# Patient Record
Sex: MALE | Race: OTHER | NOT HISPANIC OR LATINO | URBAN - METROPOLITAN AREA
[De-identification: names, ages, dates, MRNs, and addresses within clinical notes are randomized per-mention and may not be internally consistent; named-entity substitution may affect disease eponyms.]

---

## 2020-04-24 ENCOUNTER — INPATIENT (INPATIENT)
Facility: HOSPITAL | Age: 70
LOS: 7 days | Discharge: HOME HEALTH | DRG: 871 | End: 2020-05-02
Attending: INTERNAL MEDICINE | Admitting: HOSPITALIST
Payer: MEDICARE

## 2020-04-24 VITALS
TEMPERATURE: 98 F | DIASTOLIC BLOOD PRESSURE: 85 MMHG | SYSTOLIC BLOOD PRESSURE: 148 MMHG | HEART RATE: 98 BPM | OXYGEN SATURATION: 98 %

## 2020-04-24 DIAGNOSIS — Z20.828 CONTACT WITH AND (SUSPECTED) EXPOSURE TO OTHER VIRAL COMMUNICABLE DISEASES: ICD-10-CM

## 2020-04-24 DIAGNOSIS — Z51.5 ENCOUNTER FOR PALLIATIVE CARE: ICD-10-CM

## 2020-04-24 DIAGNOSIS — R09.02 HYPOXEMIA: ICD-10-CM

## 2020-04-24 DIAGNOSIS — R53.81 OTHER MALAISE: ICD-10-CM

## 2020-04-24 DIAGNOSIS — F03.90 UNSPECIFIED DEMENTIA WITHOUT BEHAVIORAL DISTURBANCE: ICD-10-CM

## 2020-04-24 DIAGNOSIS — F05 DELIRIUM DUE TO KNOWN PHYSIOLOGICAL CONDITION: ICD-10-CM

## 2020-04-24 DIAGNOSIS — J96.91 RESPIRATORY FAILURE, UNSPECIFIED WITH HYPOXIA: ICD-10-CM

## 2020-04-24 LAB
ALBUMIN SERPL ELPH-MCNC: 2.7 G/DL — LOW (ref 3.3–5)
ALP SERPL-CCNC: 130 U/L — HIGH (ref 40–120)
ALT FLD-CCNC: 236 U/L — HIGH (ref 10–45)
ANION GAP SERPL CALC-SCNC: 10 MMOL/L — SIGNIFICANT CHANGE UP (ref 5–17)
ANION GAP SERPL CALC-SCNC: 8 MMOL/L — SIGNIFICANT CHANGE UP (ref 5–17)
APPEARANCE UR: CLEAR — SIGNIFICANT CHANGE UP
AST SERPL-CCNC: 284 U/L — HIGH (ref 10–40)
BACTERIA # UR AUTO: NEGATIVE — SIGNIFICANT CHANGE UP
BASE EXCESS BLDV CALC-SCNC: 2.8 MMOL/L — HIGH (ref -2–2)
BASOPHILS # BLD AUTO: 0.04 K/UL — SIGNIFICANT CHANGE UP (ref 0–0.2)
BASOPHILS NFR BLD AUTO: 0.6 % — SIGNIFICANT CHANGE UP (ref 0–2)
BILIRUB SERPL-MCNC: 0.6 MG/DL — SIGNIFICANT CHANGE UP (ref 0.2–1.2)
BILIRUB UR-MCNC: NEGATIVE — SIGNIFICANT CHANGE UP
BUN SERPL-MCNC: 15 MG/DL — SIGNIFICANT CHANGE UP (ref 7–23)
BUN SERPL-MCNC: 18 MG/DL — SIGNIFICANT CHANGE UP (ref 7–23)
CA-I SERPL-SCNC: 1.19 MMOL/L — SIGNIFICANT CHANGE UP (ref 1.12–1.3)
CALCIUM SERPL-MCNC: 8.6 MG/DL — SIGNIFICANT CHANGE UP (ref 8.4–10.5)
CALCIUM SERPL-MCNC: 9 MG/DL — SIGNIFICANT CHANGE UP (ref 8.4–10.5)
CHLORIDE BLDV-SCNC: 115 MMOL/L — HIGH (ref 96–108)
CHLORIDE SERPL-SCNC: 108 MMOL/L — SIGNIFICANT CHANGE UP (ref 96–108)
CHLORIDE SERPL-SCNC: 109 MMOL/L — HIGH (ref 96–108)
CO2 BLDV-SCNC: 29 MMOL/L — SIGNIFICANT CHANGE UP (ref 22–30)
CO2 SERPL-SCNC: 21 MMOL/L — LOW (ref 22–31)
CO2 SERPL-SCNC: 23 MMOL/L — SIGNIFICANT CHANGE UP (ref 22–31)
COLOR SPEC: YELLOW — SIGNIFICANT CHANGE UP
CREAT SERPL-MCNC: 0.51 MG/DL — SIGNIFICANT CHANGE UP (ref 0.5–1.3)
CREAT SERPL-MCNC: 0.56 MG/DL — SIGNIFICANT CHANGE UP (ref 0.5–1.3)
CRP SERPL-MCNC: 27.76 MG/DL — HIGH (ref 0–0.4)
D DIMER BLD IA.RAPID-MCNC: 1747 NG/ML DDU — HIGH
DIFF PNL FLD: NEGATIVE — SIGNIFICANT CHANGE UP
EOSINOPHIL # BLD AUTO: 0.04 K/UL — SIGNIFICANT CHANGE UP (ref 0–0.5)
EOSINOPHIL NFR BLD AUTO: 0.6 % — SIGNIFICANT CHANGE UP (ref 0–6)
EPI CELLS # UR: 2 /HPF — SIGNIFICANT CHANGE UP
FERRITIN SERPL-MCNC: 3623 NG/ML — HIGH (ref 30–400)
GAS PNL BLDV: 143 MMOL/L — SIGNIFICANT CHANGE UP (ref 135–145)
GAS PNL BLDV: SIGNIFICANT CHANGE UP
GAS PNL BLDV: SIGNIFICANT CHANGE UP
GLUCOSE BLDV-MCNC: 110 MG/DL — HIGH (ref 70–99)
GLUCOSE SERPL-MCNC: 127 MG/DL — HIGH (ref 70–99)
GLUCOSE SERPL-MCNC: 144 MG/DL — HIGH (ref 70–99)
GLUCOSE UR QL: NEGATIVE — SIGNIFICANT CHANGE UP
HCO3 BLDV-SCNC: 28 MMOL/L — SIGNIFICANT CHANGE UP (ref 21–29)
HCT VFR BLD CALC: 34.8 % — LOW (ref 39–50)
HCT VFR BLDA CALC: 38 % — LOW (ref 39–50)
HGB BLD CALC-MCNC: 12.3 G/DL — LOW (ref 13–17)
HGB BLD-MCNC: 11.3 G/DL — LOW (ref 13–17)
HYALINE CASTS # UR AUTO: 0 /LPF — SIGNIFICANT CHANGE UP (ref 0–7)
IMM GRANULOCYTES NFR BLD AUTO: 2.1 % — HIGH (ref 0–1.5)
KETONES UR-MCNC: SIGNIFICANT CHANGE UP
LACTATE BLDV-MCNC: 2.7 MMOL/L — HIGH (ref 0.7–2)
LEUKOCYTE ESTERASE UR-ACNC: NEGATIVE — SIGNIFICANT CHANGE UP
LYMPHOCYTES # BLD AUTO: 0.59 K/UL — LOW (ref 1–3.3)
LYMPHOCYTES # BLD AUTO: 8.2 % — LOW (ref 13–44)
MCHC RBC-ENTMCNC: 30.6 PG — SIGNIFICANT CHANGE UP (ref 27–34)
MCHC RBC-ENTMCNC: 32.5 GM/DL — SIGNIFICANT CHANGE UP (ref 32–36)
MCV RBC AUTO: 94.3 FL — SIGNIFICANT CHANGE UP (ref 80–100)
MONOCYTES # BLD AUTO: 0.29 K/UL — SIGNIFICANT CHANGE UP (ref 0–0.9)
MONOCYTES NFR BLD AUTO: 4 % — SIGNIFICANT CHANGE UP (ref 2–14)
NEUTROPHILS # BLD AUTO: 6.06 K/UL — SIGNIFICANT CHANGE UP (ref 1.8–7.4)
NEUTROPHILS NFR BLD AUTO: 84.5 % — HIGH (ref 43–77)
NITRITE UR-MCNC: NEGATIVE — SIGNIFICANT CHANGE UP
NRBC # BLD: 0 /100 WBCS — SIGNIFICANT CHANGE UP (ref 0–0)
OTHER CELLS CSF MANUAL: 3 ML/DL — LOW (ref 18–22)
PCO2 BLDV: 45 MMHG — SIGNIFICANT CHANGE UP (ref 35–50)
PH BLDV: 7.4 — SIGNIFICANT CHANGE UP (ref 7.35–7.45)
PH UR: 7.5 — SIGNIFICANT CHANGE UP (ref 5–8)
PLATELET # BLD AUTO: 353 K/UL — SIGNIFICANT CHANGE UP (ref 150–400)
PO2 BLDV: <20 MMHG — LOW (ref 25–45)
POTASSIUM BLDV-SCNC: 3.7 MMOL/L — SIGNIFICANT CHANGE UP (ref 3.5–5.3)
POTASSIUM SERPL-MCNC: 6.1 MMOL/L — HIGH (ref 3.5–5.3)
POTASSIUM SERPL-MCNC: 7.9 MMOL/L — CRITICAL HIGH (ref 3.5–5.3)
POTASSIUM SERPL-SCNC: 6.1 MMOL/L — HIGH (ref 3.5–5.3)
POTASSIUM SERPL-SCNC: 7.9 MMOL/L — CRITICAL HIGH (ref 3.5–5.3)
PROCALCITONIN SERPL-MCNC: 0.16 NG/ML — HIGH (ref 0.02–0.1)
PROT SERPL-MCNC: 7.6 G/DL — SIGNIFICANT CHANGE UP (ref 6–8.3)
PROT UR-MCNC: ABNORMAL
RBC # BLD: 3.69 M/UL — LOW (ref 4.2–5.8)
RBC # FLD: 14.1 % — SIGNIFICANT CHANGE UP (ref 10.3–14.5)
RBC CASTS # UR COMP ASSIST: 3 /HPF — SIGNIFICANT CHANGE UP (ref 0–4)
SAO2 % BLDV: 19 % — LOW (ref 67–88)
SARS-COV-2 RNA SPEC QL NAA+PROBE: SIGNIFICANT CHANGE UP
SODIUM SERPL-SCNC: 139 MMOL/L — SIGNIFICANT CHANGE UP (ref 135–145)
SODIUM SERPL-SCNC: 140 MMOL/L — SIGNIFICANT CHANGE UP (ref 135–145)
SP GR SPEC: 1.04 — HIGH (ref 1.01–1.02)
UROBILINOGEN FLD QL: ABNORMAL
WBC # BLD: 7.17 K/UL — SIGNIFICANT CHANGE UP (ref 3.8–10.5)
WBC # FLD AUTO: 7.17 K/UL — SIGNIFICANT CHANGE UP (ref 3.8–10.5)
WBC UR QL: 1 /HPF — SIGNIFICANT CHANGE UP (ref 0–5)

## 2020-04-24 PROCEDURE — 99291 CRITICAL CARE FIRST HOUR: CPT | Mod: CS,GC

## 2020-04-24 PROCEDURE — 74177 CT ABD & PELVIS W/CONTRAST: CPT | Mod: 26

## 2020-04-24 PROCEDURE — 99223 1ST HOSP IP/OBS HIGH 75: CPT

## 2020-04-24 PROCEDURE — 71045 X-RAY EXAM CHEST 1 VIEW: CPT | Mod: 26

## 2020-04-24 PROCEDURE — 93010 ELECTROCARDIOGRAM REPORT: CPT

## 2020-04-24 PROCEDURE — 99222 1ST HOSP IP/OBS MODERATE 55: CPT

## 2020-04-24 PROCEDURE — 99221 1ST HOSP IP/OBS SF/LOW 40: CPT | Mod: CS,AI

## 2020-04-24 PROCEDURE — 99221 1ST HOSP IP/OBS SF/LOW 40: CPT

## 2020-04-24 RX ORDER — LANOLIN ALCOHOL/MO/W.PET/CERES
3 CREAM (GRAM) TOPICAL AT BEDTIME
Refills: 0 | Status: DISCONTINUED | OUTPATIENT
Start: 2020-04-24 | End: 2020-05-02

## 2020-04-24 RX ORDER — ENOXAPARIN SODIUM 100 MG/ML
40 INJECTION SUBCUTANEOUS DAILY
Refills: 0 | Status: DISCONTINUED | OUTPATIENT
Start: 2020-04-24 | End: 2020-05-02

## 2020-04-24 RX ORDER — HALOPERIDOL DECANOATE 100 MG/ML
0.5 INJECTION INTRAMUSCULAR EVERY 8 HOURS
Refills: 0 | Status: DISCONTINUED | OUTPATIENT
Start: 2020-04-24 | End: 2020-04-29

## 2020-04-24 RX ORDER — HALOPERIDOL DECANOATE 100 MG/ML
0.5 INJECTION INTRAMUSCULAR EVERY 6 HOURS
Refills: 0 | Status: DISCONTINUED | OUTPATIENT
Start: 2020-04-24 | End: 2020-04-24

## 2020-04-24 RX ORDER — ACETAMINOPHEN 500 MG
1000 TABLET ORAL ONCE
Refills: 0 | Status: COMPLETED | OUTPATIENT
Start: 2020-04-24 | End: 2020-04-24

## 2020-04-24 RX ORDER — HYDROXYZINE HCL 10 MG
25 TABLET ORAL DAILY
Refills: 0 | Status: DISCONTINUED | OUTPATIENT
Start: 2020-04-24 | End: 2020-04-29

## 2020-04-24 RX ORDER — ALBUTEROL 90 UG/1
2 AEROSOL, METERED ORAL EVERY 4 HOURS
Refills: 0 | Status: DISCONTINUED | OUTPATIENT
Start: 2020-04-24 | End: 2020-04-27

## 2020-04-24 RX ORDER — SODIUM CHLORIDE 9 MG/ML
1000 INJECTION, SOLUTION INTRAVENOUS
Refills: 0 | Status: DISCONTINUED | OUTPATIENT
Start: 2020-04-24 | End: 2020-04-25

## 2020-04-24 RX ADMIN — SODIUM CHLORIDE 75 MILLILITER(S): 9 INJECTION, SOLUTION INTRAVENOUS at 15:55

## 2020-04-24 RX ADMIN — HALOPERIDOL DECANOATE 0.5 MILLIGRAM(S): 100 INJECTION INTRAMUSCULAR at 13:18

## 2020-04-24 RX ADMIN — Medication 0.5 MILLIGRAM(S): at 10:41

## 2020-04-24 RX ADMIN — ALBUTEROL 2 PUFF(S): 90 AEROSOL, METERED ORAL at 13:12

## 2020-04-24 RX ADMIN — Medication 1 APPLICATION(S): at 15:55

## 2020-04-24 RX ADMIN — Medication 400 MILLIGRAM(S): at 15:46

## 2020-04-24 RX ADMIN — ENOXAPARIN SODIUM 40 MILLIGRAM(S): 100 INJECTION SUBCUTANEOUS at 13:02

## 2020-04-24 NOTE — BEHAVIORAL HEALTH ASSESSMENT NOTE - RISK ASSESSMENT
Low Acute Suicide Risk Risk factors: acute/chronic medical issues, acute/chronic cognitive impairments, noncompliant with treatment, not receiving treatment, recent irritability and agitation while in the hospital    Protective factors: no h/o SA/SIB, no h/o psych admissions, no access to weapons, no active substance abuse, with adult children/grandchildren, domiciled, intact marriage, social supports    Overall, pt is a low risk of harm to self/others and does not require psychiatric admission for safety and stabilization.

## 2020-04-24 NOTE — CONSULT NOTE ADULT - SUBJECTIVE AND OBJECTIVE BOX
Patient is a 70y old  Male who presents with a chief complaint of sob (24 Apr 2020 14:41)      HPI:  69 yo M hx dementia nonverbal at baseline,   . Per pt's daughter: pt was short of breath, productive coughing for 1 week, lethargy.   Has had "on and off" for few weeks, and has been having fever Tm 104. Has not been tested for coronavirus.    Per EMS, pt has been "sick" for 3 weeks in a house with 2 covid positive family members.   Per EMS pt saturating 88% on RA improved with NRB to 96%.   Per EMS pt is at his baseline now, A&Ox0  Above reviewed:   Pt unable to provide any information due to dementia.       PAST MEDICAL & SURGICAL HISTORY:  Dementia      REVIEW OF SYSTEMS    General:	Denies any chills. Fevers absent    Skin: No rash  	  Ophthalmologic: Denies any visual complaints, discharge, redness.  	  ENMT: No nasal congestion or throat pain.     Respiratory and Thorax: No cough, sputum or chest pain. Denies shortness of breath.  	  Cardiovascular: No chest pain, palpitations.    Gastrointestinal: No nausea, abdominal pain or diarrhea.    Genitourinary: No dysuria, frequency. No flank pain.    Musculoskeletal: No joint swelling or pain.    Neurological: No confusion. No extremity weakness.    Psychiatric: No hallucinations	    Hematology/Lymphatics: No LN swelling.    Endocrine: No recent weight gain or loss. No abnormal heat/cold intolerance    Allergic/Immunologic:	No hives or rash     Social history:    FAMILY HISTORY:      Allergies    No Known Allergies    Intolerances        Antimicrobials:          Vital Signs Last 24 Hrs  T(C): 38.7 (24 Apr 2020 15:35), Max: 38.7 (24 Apr 2020 15:35)  T(F): 101.6 (24 Apr 2020 15:35), Max: 101.6 (24 Apr 2020 15:35)  HR: 69 (24 Apr 2020 15:35) (55 - 98)  BP: 134/74 (24 Apr 2020 15:35) (123/90 - 148/85)  BP(mean): --  RR: 30 (24 Apr 2020 15:35) (28 - 30)  SpO2: 94% (24 Apr 2020 15:35) (93% - 98%)    PHYSICAL EXAM: Patient in no acute distress.    Constitutional: Comfortable. Awake and alert    Eyes: No discharge or conjunctival injection    ENMT: No thrush. No pharyngeal exudate or erythema.    Neck: Supple, No LN.    Respiratory: Good air entry bilaterally.    Cardiovascular: S1 S2 wnl, No murmurs.    Gastrointestinal: Soft BS(+) no tenderness, non distended.    Genitourinary: No CVA tenderness     Extremities: No edema.    Vascular: peripheral pulses felt    Neurological: AAO X 3. No grossly focal deficits.    Skin: No rash     Musculoskeletal: No joint swelling.    Psychiatric: Affect normal.                              11.3   7.17  )-----------( 353      ( 24 Apr 2020 08:16 )             34.8       04-24    139  |  108  |  15  ----------------------------<  127<H>  6.1<H>   |  21<L>  |  0.51    Ca    8.6      24 Apr 2020 10:29    TPro  7.6  /  Alb  2.7<L>  /  TBili  0.6  /  DBili  x   /  AST  284<H>  /  ALT  236<H>  /  AlkPhos  130<H>  04-24            Radiology: Imaging reviewed and visualized personally [ x] Patient is a 70y old  Male who presents with a chief complaint of sob (24 Apr 2020 14:41)      HPI:  69 yo M hx dementia nonverbal at baseline,   . Per pt's daughter: pt was short of breath, productive coughing for 1 week, lethargy.   Has had "on and off" for few weeks, and has been having fever Tm 104. Has not been tested for coronavirus.    Per EMS, pt has been "sick" for 3 weeks in a house with 2 covid positive family members.   Per EMS pt saturating 88% on RA improved with NRB to 96%.   Per EMS pt is at his baseline now, A&Ox0  Rash noted by EMS.   Above reviewed:   Pt unable to provide any information due to dementia.       PAST MEDICAL & SURGICAL HISTORY:  Dementia      REVIEW OF SYSTEMS  Unable to obtain due to pt's underlying mental status.       Social history:  Lives with family.       FAMILY HISTORY:  Unable to obtain due to pt's underlying mental status.       Allergies  No Known Allergies      Antimicrobials:        Vital Signs Last 24 Hrs  T(C): 38.7 (24 Apr 2020 15:35), Max: 38.7 (24 Apr 2020 15:35)  T(F): 101.6 (24 Apr 2020 15:35), Max: 101.6 (24 Apr 2020 15:35)  HR: 69 (24 Apr 2020 15:35) (55 - 98)  BP: 134/74 (24 Apr 2020 15:35) (123/90 - 148/85)  BP(mean): --  RR: 30 (24 Apr 2020 15:35) (28 - 30)  SpO2: 94% (24 Apr 2020 15:35) (93% - 98%)        PHYSICAL EXAM:     Constitutional: Agitated, appears tachypneic. On O2.     Eyes: No conjunctival injection    ENT: No thrush.    Neck: Supple,     Respiratory: + air entry bilaterally, rales scattered.     Cardiovascular: S1 S2 wnl,    Gastrointestinal: Soft BS(+) non distended.    Genitourinary: condom catheter.      Extremities: No edema.    Vascular: peripheral pulses felt    Neurological: Moving all extremities.     Skin: + rash papular, mostly chest and abdomen, abdomen lt sided, chest b/l, mostly excoriated rash, one of two ? vesicles.     Musculoskeletal: No joint swelling.    Psychiatric: agitated.                              11.3   7.17  )-----------( 353      ( 24 Apr 2020 08:16 )             34.8       04-24    139  |  108  |  15  ----------------------------<  127<H>  6.1<H>   |  21<L>  |  0.51    Ca    8.6      24 Apr 2020 10:29    TPro  7.6  /  Alb  2.7<L>  /  TBili  0.6  /  DBili  x   /  AST  284<H>  /  ALT  236<H>  /  AlkPhos  130<H>  04-24      Urinalysis + Microscopic Examination (04.24.20 @ 17:55)    Glucose Qualitative, Urine: Negative    Blood, Urine: Negative    Urine Appearance: Clear    Bilirubin: Negative    Color: Yellow    Urobilinogen: 4 mg/dL    Specific Gravity: 1.044    Protein, Urine: 30 mg/dL    pH Urine: 7.5    Leukocyte Esterase Concentration: Negative    Nitrite: Negative    Ketone - Urine: Trace    Red Blood Cell - Urine: 3 /hpf    White Blood Cell - Urine: 1 /HPF    Epithelial Cells: 2 /hpf    Hyaline Casts: 0 /LPF    Bacteria: Negative      COVID-19 PCR . (04.24.20 @ 09:22)    COVID-19 PCR: NotDetec        Radiology: Imaging reviewed and visualized personally [ x]    < from: Xray Chest 1 View- PORTABLE-Urgent (04.24.20 @ 07:48) >  IMPRESSION: Diffuse bilateral patchy opacities likely representing atypical/viral pneumonia. COVID 19 is considered.      < from: CT Abdomen and Pelvis w/ IV Cont (04.24.20 @ 10:00) >  IMPRESSION:   Right inguinal hernia that involves a portion of the bladder fundus. See above.  No evidence of bowel related inflammation nor obstruction.  Moderate bibasilar parenchymal infiltrates compatible with atypical pneumonia including Covid 19.

## 2020-04-24 NOTE — BEHAVIORAL HEALTH ASSESSMENT NOTE - OTHER
unable to assess- defer to H&P patient nonverbal patient nonverbal, moaning unable to assess- patient nonverbal see above grandson

## 2020-04-24 NOTE — CONSULT NOTE ADULT - SUBJECTIVE AND OBJECTIVE BOX
HPI    69 yo M hx dementia nonverbal at baseline, family denies other PMHx. Per pt's daughter: pt was short of breath, productive coughing for 1 week, lethargy. Has had "on and off" for few weeks, and has been having fever Tm 104. Has not been tested for coronavirus.  Per EMS, pt has been "sick" for 3 weeks in a house with 2 covid positive family members. Per EMS pt saturating 88% on RA improved with NRB to 96%. Also per EMS rash to trunk noted. Per EMS pt is at his baseline now, A&Ox0.    PAST MEDICAL & SURGICAL HISTORY:  Dementia    MEDICATIONS  (STANDING):  betamethasone valerate 0.1% Cream 1 Application(s) Topical two times a day  enoxaparin Injectable 40 milliGRAM(s) SubCutaneous daily    MEDICATIONS  (PRN):  ALBUTerol    90 MICROgram(s) HFA Inhaler 2 Puff(s) Inhalation every 4 hours PRN Wheezing  haloperidol    Injectable 0.5 milliGRAM(s) IntraMuscular every 8 hours PRN Agitation  hydrOXYzine hydrochloride 25 milliGRAM(s) Oral daily PRN Itching    FAMILY HISTORY: N/A    Allergis    No Known Allergies    Intolerances    SOCIAL HISTORY: N/A    REVIEW OF SYSTEMS: Otherwise negative as stated in HPI    Physical Exam  Vital signs  T(C): 37.2 (04-24-20 @ 12:10), Max: 37.6 (04-24-20 @ 07:47)  HR: 55 (04-24-20 @ 12:10)  BP: 127/78 (04-24-20 @ 12:10)  SpO2: 93% (04-24-20 @ 12:10)  Wt(kg): --    Output    Gen:  Agitated    Pulm:  CTAB, no rales, no rhonchi  	  CV:  RRR    GI:  S/ND/NT    :  Right inguinal hernia  No suprapubic fullness or tenderness    LABS:  04-24 @ 10:29  WBC --    / Hct --    / SCr 0.51     04-24 @ 08:16  WBC 7.17  / Hct 34.8  / SCr 0.56     04-24    139  |  108  |  15  ----------------------------<  127<H>  6.1<H>   |  21<L>  |  0.51    Ca    8.6      24 Apr 2020 10:29    TPro  7.6  /  Alb  2.7<L>  /  TBili  0.6  /  DBili  x   /  AST  284<H>  /  ALT  236<H>  /  AlkPhos  130<H>  04-24    Urine Cx: p  Blood Cx: p    RADIOLOGY:  < from: CT Abdomen and Pelvis w/ IV Cont (04.24.20 @ 10:00) >  IMPRESSION:   Right inguinal hernia that involves a portion of the bladder fundus. See above.  No evidence of bowel related inflammation nor obstruction.  Moderate bibasilar parenchymal infiltrates compatible with atypical pneumonia including Covid 19.  The pertinent findings were discussed with Dr. Díaz at the conclusion of today's evaluation.    < end of copied text >

## 2020-04-24 NOTE — CONSULT NOTE ADULT - SUBJECTIVE AND OBJECTIVE BOX
COVID 19 Status:  [    x ]  Person of interest/contact with known COVID patient/Rule out COVID  [     ]  Confirmed COVID/COVID Positive    Current Location  [facility] 9MON 913 W1      Reason for consult  [      ] Intractable symptoms/symptoms not controlled despite treatment intiation and escalation  [  x    ] Assistance with complicated medical decision making after initial goals of care discussion  conducted and documented  [      ] Both        Ventilator Status  [      ] Yes and Intubated  [      ] Yes and trach  [  x    ] No        A review of the paper chart has been conducted  HCP form present:               Yes and valid []               Yes but invalid []                No []   MOLST present:                   Yes and valid []               Yes but invalid []                No []  Incapacity form present:   Yes and valid []                Yes but invalid []                No []                 N/A []  Living Will:                            Yes and valid []                Yes but invalid []                No []      Family Heatlh Care Decision Act Surrogate Decision Maker Hierarchy  [facility] Article 81 Guardian-->Spouse or domestic partner--> Adult child-->Parent-->Sibling--> Close friend      Contacts listed in the paper or electronic chart  Name  Relationship  Number(s)        In the event of newly developing, evolving, or worsening symptoms, please contact the Palliative Medicine team via pager (if the patient is at [facility] #[phone] or if the patient is at [facility] #[phone]) The Geriatric and Palliative Medicine service has coverage 24 hours a day/ 7 days a week to provide medical recommendations regarding symptom management needs via telephone          HPI:  71 yo M hx dementia nonverbal at baseline,   . Per pt's daughter: pt was short of breath, productive coughing for 1 week, lethargy.   Has had "on and off" for few weeks, and has been having fever Tm 104. Has not been tested for coronavirus.    Per EMS, pt has been "sick" for 3 weeks in a house with 2 covid positive family members.   Per EMS pt saturating 88% on RA improved with NRB to 96%.   Also per EMS rash to trunk noted. Per EMS pt is at his baseline now, A&Ox0  . Per grandson, pt's daughter Sade Roca is HCP. (24 Apr 2020 11:24)    PERTINENT PM/SXH:   Dementia      FAMILY HISTORY:    ITEMS NOT CHECKED ARE NOT PRESENT    SOCIAL HISTORY:   Significant other/partner[ ]  Children[ ]  Shinto/Spirituality:  Substance hx:  [ ]   Tobacco hx:  [ ]   Alcohol hx: [ ]   Home Opioid hx:  [ ] I-Stop Reference No:  Living Situation: [ ]Home  [ ]Long term care  [ ]Rehab [ ]Other    ADVANCE DIRECTIVES:    DNR  MOLST  [ ]  Living Will  [ ]   DECISION MAKER(s):  [ ] Health Care Proxy(s)  [ ] Surrogate(s)  [ ] Guardian           Name(s): Phone Number(s):    BASELINE (I)ADL(s) (prior to admission):  [place]: [ ]Total  [ ] Moderate [ ]Dependent    Allergies    No Known Allergies    Intolerances    MEDICATIONS  (STANDING):  enoxaparin Injectable 40 milliGRAM(s) SubCutaneous daily    MEDICATIONS  (PRN):    PRESENT SYMPTOMS: [ ]Unable to obtain due to poor mentation   Source if other than patient:  [ ]Family   [ ]Team     Pain: [ ]yes [ ]no  QOL impact -   Location -                    Aggravating factors -  Quality -  Radiation -  Timing-  Severity (0-10 scale):  Minimal acceptable level (0-10 scale):     PAIN AD Score:      http://[email or web address]/painad.pdf (press ctrl +  left click to view)    Dyspnea:                           [ ]Mild [ ]Moderate [ ]Severe  Anxiety:                             [ ]Mild [ ]Moderate [ ]Severe  Fatigue:                             [ ]Mild [ ]Moderate [ ]Severe  Nausea:                             [ ]Mild [ ]Moderate [ ]Severe  Loss of appetite:              [ ]Mild [ ]Moderate [ ]Severe  Constipation:                    [ ]Mild [ ]Moderate [ ]Severe    Other Symptoms:  [ ]All other review of systems negative     Palliative Performance Status Version 2:         %    http://npcrc.org/files/news/palliative_performance_scale_ppsv2.pdf  PHYSICAL EXAM:  Vital Signs Last 24 Hrs  T(C): 37.2 (24 Apr 2020 12:10), Max: 37.6 (24 Apr 2020 07:47)  T(F): 98.9 (24 Apr 2020 12:10), Max: 99.7 (24 Apr 2020 07:47)  HR: 55 (24 Apr 2020 12:10) (55 - 98)  BP: 127/78 (24 Apr 2020 12:10) (123/90 - 148/85)  BP(mean): --  RR: 4 (24 Apr 2020 12:10) (4 - 4)  SpO2: 93% (24 Apr 2020 12:10) (93% - 98%) I&O's Summary          GENERAL: [ x] To prevent/minimize further potential COVID 19 exposure  to patients and health care staff, the physical examination will be deferred  [ ]Alert  [ ]Oriented x   [ ]Lethargic  [ ]Cachexia  [ ]Unarousable  [ ]Verbal  [ ]Non-Verbal    Behavioral:   [ x] To prevent/minimize further potential COVID 19 exposure  to patients and health care staff, the physical examination will be deferred  [ ] Anxiety  [ ] Delirium [ ] Agitation [ ] Other  HEENT  [x ] To prevent/minimize further potential COVID 19 exposure  to patients and health care staff, the physical examination will be deferred  [ ]Normal   [ ]Dry mouth   [ ]ET Tube/Trach  [ ]Oral lesions  PULMONARY:   [ x] To prevent/minimize further potential COVID 19 exposure  to patients and health care staff, the physical examination will be deferred  [ ]Clear [ ]Tachypnea  [ ]Audible excessive secretions   [ ]Rhonchi        [ ]Right [ ]Left [ ]Bilateral  [ ]Crackles        [ ]Right [ ]Left [ ]Bilateral  [ ]Wheezing     [ ]Right [ ]Left [ ]Bilatera  [ ]Diminished breath sounds [ ]right [ ]left [ ]bilateral  CARDIOVASCULAR:    [x ] To prevent/minimize further potential COVID 19 exposure  to patients and health care staff, the physical examination will be deferred  [ ]Regular [ ]Irregular [ ]Tachy  [ ]Juan [ ]Murmur [ ]Other  GASTROINTESTINAL:  [x ] To prevent/minimize further potential COVID 19 exposure  to patients and health care staff, the physical examination will be deferred  [ ]Soft  [ ]Distended   [ ]+BS  [ ]Non tender [ ]Tender  [ ]PEG [ ]OGT/ NGT  Last BM:   GENITOURINARY:  [ x] To prevent/minimize further potential COVID 19 exposure  to patients and health care staff, the physical examination will be deferred  [ ]Normal [ ] Incontinent   [ ]Oliguria/Anuria   [ ]Olea  MUSCULOSKELETAL:   [ x] To prevent/minimize further potential COVID 19 exposure  to patients and health care staff, the physical examination will be deferred  [ ]Normal   [ ]Weakness  [ ]Bed/Wheelchair bound [ ]Edema  NEUROLOGIC:   [ x] To prevent/minimize further potential COVID 19 exposure  to patients and health care staff, the physical examination will be deferred  [ ]No focal deficits  [ ]Cognitive impairment  [ ]Dysphagia [ ]Dysarthria [ ]Paresis [ ]Other   SKIN:   [ x] To prevent/minimize further potential COVID 19 exposure  to patients and health care staff, the physical examination will be deferred  [ ]Normal    [ ]Rash  [ ]Pressure ulcer(s)       Present on admission [ ]y [ ]n    CRITICAL CARE:  [ ] Shock Present  [ ]Septic [ ]Cardiogenic [ ]Neurologic [ ]Hypovolemic  [ ]  Vasopressors [ ]  Inotropes   [ ]Respiratory failure present [ ]Mechanical ventilation [ ]Non-invasive ventilatory support [ ]High flow  [ ]Acute  [ ]Chronic [ ]Hypoxic  [ ]Hypercarbic [ ]Other  [ ]Other organ failure     LABS:                        11.3   7.17  )-----------( 353      ( 24 Apr 2020 08:16 )             34.8   04-24    139  |  108  |  15  ----------------------------<  127<H>  6.1<H>   |  21<L>  |  0.51    Ca    8.6      24 Apr 2020 10:29    TPro  7.6  /  Alb  2.7<L>  /  TBili  0.6  /  DBili  x   /  AST  284<H>  /  ALT  236<H>  /  AlkPhos  130<H>  04-24        RADIOLOGY & ADDITIONAL STUDIES:    PROTEIN CALORIE MALNUTRITION PRESENT: [ ]mild [ ]moderate [ ]severe [ ]underweight [ ]morbid obesity  https://www.andeal.org/vault/2440/web/files/ONC/Table_Clinical%20Characteristics%20to%20Document%20Malnutrition-White%20JV%20et%20al%048265.pdf    Height (cm): 182.9 (04-24-20 @ 12:10)  Weight (kg): 77.5 (04-24-20 @ 12:10)  BMI (kg/m2): 23.2 (04-24-20 @ 12:10)    [ ]PPSV2 < or = to 30% [ ]significant weight loss  [ ]poor nutritional intake  [ ]anasarca     Albumin, Serum: 2.7 g/dL (04-24-20 @ 08:16)   [ ]Artificial Nutrition      REFERRALS:   [ ]Chaplaincy  [ ]Hospice  [ ]Child Life  [ ]Social Work  [ ]Case management [ ]Holistic Therapy     Goals of Care Document: COVID 19 Status:  [    x ]  Person of interest/contact with known COVID patient/Rule out COVID  [     ]  Confirmed COVID/COVID Positive    Current Location  [facility] 9MON 913 W1      Reason for consult  [      ] Intractable symptoms/symptoms not controlled despite treatment intiation and escalation  [  x    ] Assistance with complicated medical decision making after initial goals of care discussion  conducted and documented  [      ] Both        Ventilator Status  [      ] Yes and Intubated  [      ] Yes and trach  [  x    ] No        A review of the paper chart/EMR  has been conducted  HCP form present:               Yes and valid []               Yes but invalid []                No [x]   MOLST present:                   Yes and valid []               Yes but invalid []                No [x]  Incapacity form present:   Yes and valid []                Yes but invalid []                No [x]                 N/A []  Living Will:                            Yes and valid []                Yes but invalid []                No [x]      Family Heatlh Care Decision Act Surrogate Decision Maker Hierarchy  [facility] Article 81 Guardian-->Spouse or domestic partner--> Adult child-->Parent-->Sibling--> Close friend      Contacts listed in the paper or electronic chart  Name  Relationship  Number(s)        In the event of newly developing, evolving, or worsening symptoms, please contact the Palliative Medicine team via pager (if the patient is at [facility] #[phone] or if the patient is at [facility] #[phone]) The Geriatric and Palliative Medicine service has coverage 24 hours a day/ 7 days a week to provide medical recommendations regarding symptom management needs via telephone          HPI:  69 yo M hx dementia nonverbal at baseline,   . Per pt's daughter: pt was short of breath, productive coughing for 1 week, lethargy.   Has had "on and off" for few weeks, and has been having fever Tm 104. Has not been tested for coronavirus.    Per EMS, pt has been "sick" for 3 weeks in a house with 2 covid positive family members.   Per EMS pt saturating 88% on RA improved with NRB to 96%.   Also per EMS rash to trunk noted. Per EMS pt is at his baseline now, A&Ox0  . Per grandson, pt's daughter Sade Roca is HCP. (24 Apr 2020 11:24)    PERTINENT PM/SXH:   Dementia      FAMILY HISTORY:    ITEMS NOT CHECKED ARE NOT PRESENT    SOCIAL HISTORY:   Significant other/partner[ ]  Children[ ]  Restoration/Spirituality:  Substance hx:  [ ]   Tobacco hx:  [ ]   Alcohol hx: [ ]   Home Opioid hx:  [ ] I-Stop Reference No:  Living Situation: [ ]Home  [ ]Long term care  [ ]Rehab [ ]Other    ADVANCE DIRECTIVES:    DNR  MOLST  [ ]  Living Will  [ ]   DECISION MAKER(s):  [ ] Health Care Proxy(s)  [ ] Surrogate(s)  [ ] Guardian           Name(s): Phone Number(s):    BASELINE (I)ADL(s) (prior to admission):  [place]: [ ]Total  [ ] Moderate [ ]Dependent    Allergies    No Known Allergies    Intolerances    MEDICATIONS  (STANDING):  enoxaparin Injectable 40 milliGRAM(s) SubCutaneous daily    MEDICATIONS  (PRN):    PRESENT SYMPTOMS: [ ]Unable to obtain due to poor mentation   Source if other than patient:  [ ]Family   [ ]Team     Pain: [ ]yes [ ]no  QOL impact -   Location -                    Aggravating factors -  Quality -  Radiation -  Timing-  Severity (0-10 scale):  Minimal acceptable level (0-10 scale):     PAIN AD Score:  0    http://geriatrictoolkit.[email or web address]/painad.pdf (press ctrl +  left click to view)    Dyspnea:                           [ ]Mild [ ]Moderate [ ]Severe  Anxiety:                             [ ]Mild [ ]Moderate [ ]Severe  Fatigue:                             [ ]Mild [ ]Moderate [ ]Severe  Nausea:                             [ ]Mild [ ]Moderate [ ]Severe  Loss of appetite:              [ ]Mild [ ]Moderate [ ]Severe  Constipation:                    [ ]Mild [ ]Moderate [ ]Severe    Other Symptoms:  [ x]All other review of systems negative     Palliative Performance Status Version 2:    0     %    http://npcrc.org/files/news/palliative_performance_scale_ppsv2.pdf  PHYSICAL EXAM:  Vital Signs Last 24 Hrs  T(C): 37.2 (24 Apr 2020 12:10), Max: 37.6 (24 Apr 2020 07:47)  T(F): 98.9 (24 Apr 2020 12:10), Max: 99.7 (24 Apr 2020 07:47)  HR: 55 (24 Apr 2020 12:10) (55 - 98)  BP: 127/78 (24 Apr 2020 12:10) (123/90 - 148/85)  BP(mean): --  RR: 4 (24 Apr 2020 12:10) (4 - 4)  SpO2: 93% (24 Apr 2020 12:10) (93% - 98%) I&O's Summary          GENERAL: [ x] To prevent/minimize further potential COVID 19 exposure  to patients and health care staff, the physical examination will be deferred  [ ]Alert  [ ]Oriented x   [ ]Lethargic  [ ]Cachexia  [ ]Unarousable  [ ]Verbal  [ ]Non-Verbal    Behavioral:   [ x] To prevent/minimize further potential COVID 19 exposure  to patients and health care staff, the physical examination will be deferred  [ ] Anxiety  [ ] Delirium [ ] Agitation [ ] Other  HEENT  [x ] To prevent/minimize further potential COVID 19 exposure  to patients and health care staff, the physical examination will be deferred  [ ]Normal   [ ]Dry mouth   [ ]ET Tube/Trach  [ ]Oral lesions  PULMONARY:   [ x] To prevent/minimize further potential COVID 19 exposure  to patients and health care staff, the physical examination will be deferred  [ ]Clear [ ]Tachypnea  [ ]Audible excessive secretions   [ ]Rhonchi        [ ]Right [ ]Left [ ]Bilateral  [ ]Crackles        [ ]Right [ ]Left [ ]Bilateral  [ ]Wheezing     [ ]Right [ ]Left [ ]Bilatera  [ ]Diminished breath sounds [ ]right [ ]left [ ]bilateral  CARDIOVASCULAR:    [x ] To prevent/minimize further potential COVID 19 exposure  to patients and health care staff, the physical examination will be deferred  [ ]Regular [ ]Irregular [ ]Tachy  [ ]Juan [ ]Murmur [ ]Other  GASTROINTESTINAL:  [x ] To prevent/minimize further potential COVID 19 exposure  to patients and health care staff, the physical examination will be deferred  [ ]Soft  [ ]Distended   [ ]+BS  [ ]Non tender [ ]Tender  [ ]PEG [ ]OGT/ NGT  Last BM:   GENITOURINARY:  [ x] To prevent/minimize further potential COVID 19 exposure  to patients and health care staff, the physical examination will be deferred  [ ]Normal [ ] Incontinent   [ ]Oliguria/Anuria   [ ]Olea  MUSCULOSKELETAL:   [ x] To prevent/minimize further potential COVID 19 exposure  to patients and health care staff, the physical examination will be deferred  [ ]Normal   [ ]Weakness  [ ]Bed/Wheelchair bound [ ]Edema  NEUROLOGIC:   [ x] To prevent/minimize further potential COVID 19 exposure  to patients and health care staff, the physical examination will be deferred  [ ]No focal deficits  [ ]Cognitive impairment  [ ]Dysphagia [ ]Dysarthria [ ]Paresis [ ]Other   SKIN:   [ x] To prevent/minimize further potential COVID 19 exposure  to patients and health care staff, the physical examination will be deferred  [ ]Normal    [ ]Rash  [ ]Pressure ulcer(s)       Present on admission [ ]y [ ]n    CRITICAL CARE:  [ ] Shock Present  [ ]Septic [ ]Cardiogenic [ ]Neurologic [ ]Hypovolemic  [ ]  Vasopressors [ ]  Inotropes   [ ]Respiratory failure present [ ]Mechanical ventilation [ ]Non-invasive ventilatory support [ ]High flow  [ ]Acute  [ ]Chronic [ ]Hypoxic  [ ]Hypercarbic [ ]Other  [ ]Other organ failure     LABS:                        11.3   7.17  )-----------( 353      ( 24 Apr 2020 08:16 )             34.8   04-24    139  |  108  |  15  ----------------------------<  127<H>  6.1<H>   |  21<L>  |  0.51    Ca    8.6      24 Apr 2020 10:29    TPro  7.6  /  Alb  2.7<L>  /  TBili  0.6  /  DBili  x   /  AST  284<H>  /  ALT  236<H>  /  AlkPhos  130<H>  04-24        RADIOLOGY & ADDITIONAL STUDIES:    PROTEIN CALORIE MALNUTRITION PRESENT: [ ]mild [ ]moderate [ ]severe [ ]underweight [ ]morbid obesity  https://www.andeal.org/vault/2440/web/files/ONC/Table_Clinical%20Characteristics%20to%20Document%20Malnutrition-White%20JV%20et%20al%654730.pdf    Height (cm): 182.9 (04-24-20 @ 12:10)  Weight (kg): 77.5 (04-24-20 @ 12:10)  BMI (kg/m2): 23.2 (04-24-20 @ 12:10)    [ ]PPSV2 < or = to 30% [ ]significant weight loss  [ ]poor nutritional intake  [ ]anasarca     Albumin, Serum: 2.7 g/dL (04-24-20 @ 08:16)   [ ]Artificial Nutrition      REFERRALS:   [ ]Chaplaincy  [ ]Hospice  [ ]Child Life  [ ]Social Work  [ ]Case management [ ]Holistic Therapy     Goals of Care Document:

## 2020-04-24 NOTE — ED ADULT NURSE REASSESSMENT NOTE - NS ED NURSE REASSESS COMMENT FT1
patient at CT scan. will continue to monitor upon patient arrival back to unit. patient comfort and safety provided.

## 2020-04-24 NOTE — ED ADULT NURSE REASSESSMENT NOTE - NS ED NURSE REASSESS COMMENT FT1
patient turned changed and repositioned. patient comfort and safety provided. will continue to monitor.

## 2020-04-24 NOTE — H&P ADULT - HISTORY OF PRESENT ILLNESS
69 yo M hx dementia nonverbal at baseline,   . Per pt's daughter: pt was short of breath, productive coughing for 1 week, lethargy.   Has had "on and off" for few weeks, and has been having fever Tm 104. Has not been tested for coronavirus.    Per EMS, pt has been "sick" for 3 weeks in a house with 2 covid positive family members.   Per EMS pt saturating 88% on RA improved with NRB to 96%.   Also per EMS rash to trunk noted. Per EMS pt is at his baseline now, A&Ox0  . Per grandson, pt's daughter Sade Roca is HCP.

## 2020-04-24 NOTE — CONSULT NOTE ADULT - PROBLEM SELECTOR RECOMMENDATION 9
Predominant symptom:  Likely due to  Degree of control:  Relative to previous day:  Current treatment regimen:  Recommendations:  Risk mitigation:  Adverse events noted: Predominant symptom: dyspnea and associated cough  Likely due to vrial PNA, COVID PCR negative  Degree of control: well controlled   Relative to previous day:  Current treatment regimen: 2 L NC  Recommendations: maintain supportive care  Risk mitigation: n/a  Adverse events noted: none after d/w primary team

## 2020-04-24 NOTE — CONSULT NOTE ADULT - ASSESSMENT
70 year old male AAOx0, dementia presents with fevers at home and SOB and cough for 1 week.  Incidentally found to have a partial bladder involvement in the right inguinal hernia.     No urologic intervention is needed or necessary for this.  Surgical correction would only involve a right inguinal hernia repair.      -- No urologic intervention necessary  -- Recommend GOC discussion with family  -- If desired by family or found to be necessary, recommend a general surgery consult for the inguinal hernia  -- Will sign off, please re-consult as necessary  -- d/w Dr. Navarro 70 year old male AAOx0, dementia presents with fevers at home and SOB and cough for 1 week.  Incidentally found to have a partial bladder involvement in the right inguinal hernia.     No urologic intervention is needed or necessary for this.  Surgical correction would only involve a right inguinal hernia repair.      -- No urologic intervention necessary  -- Recommend GOC discussion with family  -- If desired by family or found to be necessary, recommend a general surgery consult for the inguinal hernia  -- Will sign off, please re-consult as necessary  -- No need for serial bladder scans  -- d/w Dr. Navarro

## 2020-04-24 NOTE — BEHAVIORAL HEALTH ASSESSMENT NOTE - NSBHCONSULTRECOMMENDOTHER_PSY_A_CORE FT
1. Check: EKG, TSH, B12, folate, RPR, UA; consider CT head    2. Minimize use of benzos, opioids, anticholinergics, or other deliriogenic agents when possible.  Maintain sleep wake cycle.  Provide frequent reorientation and redirection.  Family member at bedside if possible. Assess for need for glasses and hearing aid (if applicable).    3. Pt does not meet criteria for psychiatric hospitalization.  Recommend outpt psych f/u at [facility] after d/c- [phone].   [facility] Psych will follow.

## 2020-04-24 NOTE — ED ADULT TRIAGE NOTE - CHIEF COMPLAINT QUOTE
sob ra 90 %  h/o dementia   exposed to covid sob ra 90 %   Xi Roca [phone]  h/o dementia   exposed to covid

## 2020-04-24 NOTE — CONSULT NOTE ADULT - ASSESSMENT
71 yo M hx dementia nonverbal at baseline, brought in for short of breath, productive coughing for 1 week, lethargy.  New rash    Sepsis, fever, tachypnea, hypoxia likely due to COVID-19 infection leading to pneumonia.  Lactic acidosis,   rash.   Though COVID PCR negative, given CT chest classic for COVID, will treat as COVID.       Plan:  Perform blood cx  U/A with no pyuria, urine cx in lab.   trend markers of inflammation  supportive care.   If oxygen requirement increases and pt ends up requiring NRB can consider dose of Tocilizumab.   rash, not classic for shingles, but ?, performed VZV PCR.

## 2020-04-24 NOTE — H&P ADULT - NSHPPHYSICALEXAM_GEN_ALL_CORE
PHYSICAL EXAMINATION:  Vital Signs Last 24 Hrs  T(C): 37.6 (24 Apr 2020 07:47), Max: 37.6 (24 Apr 2020 07:47)  T(F): 99.7 (24 Apr 2020 07:47), Max: 99.7 (24 Apr 2020 07:47)  HR: 96 (24 Apr 2020 10:38) (82 - 98)  BP: 123/90 (24 Apr 2020 10:38) (123/90 - 148/85)  BP(mean): --  RR: --  SpO2: 95% (24 Apr 2020 10:38) (94% - 98%)  CAPILLARY BLOOD GLUCOSE            GENERAL: NAD, well-groomed,  HEAD:  atraumatic, normocephalic  EYES: sclera anicteric  ENMT: mucous membranes moist  NECK: supple, No JVD  CHEST/LUNG: clear to auscultation bilaterally;    no      rales   ,   no rhonchi,   HEART: normal S1, S2  ABDOMEN: BS+, soft, ND, NT   EXTREMITIES:    no    edema    b/l LEs  NEURO: awake, ,   SKIN: no     rash PHYSICAL EXAMINATION:  Vital Signs Last 24 Hrs  T(C): 37.6 (24 Apr 2020 07:47), Max: 37.6 (24 Apr 2020 07:47)  T(F): 99.7 (24 Apr 2020 07:47), Max: 99.7 (24 Apr 2020 07:47)  HR: 96 (24 Apr 2020 10:38) (82 - 98)  BP: 123/90 (24 Apr 2020 10:38) (123/90 - 148/85)  BP(mean): --  RR: --  SpO2: 95% (24 Apr 2020 10:38) (94% - 98%)  CAPILLARY BLOOD GLUCOSE            GENERAL: NAD, well-groomed,  HEAD:  atraumatic, normocephalic  EYES: sclera anicteric  ENMT: mucous membranes moist  NECK: supple, No JVD  CHEST/LUNG: clear to auscultation bilaterally;    no      rales   ,   no rhonchi,   HEART: normal S1, S2  ABDOMEN: BS+, soft, ND, NT   EXTREMITIES:    no    edema    b/l LEs  NEURO: awake, ,   agitated/  advanced   dementia  SKIN:   maculo papular    rash. mostly  on left  abdominal  wall, with a few  lesions, also on right side

## 2020-04-24 NOTE — ED PROVIDER NOTE - PROGRESS NOTE DETAILS
D/w pt's daughter Sade and grandson Santos, they are deciding about code status for pt. I have informed them of pt's likely poor prognosis based on requirements of supplemental O2, age, and signfiicant dementia. no EKG changes given hyperK, and normal Cr. rpt bmp sent. Family requested another 1-2 hours and are still discussing code status at home.

## 2020-04-24 NOTE — CONSULT NOTE ADULT - PROBLEM SELECTOR RECOMMENDATION 2
Condition:  Degree:  Active treatment:  Clinical impact on complexity: Condition: dementia, etiology unknown  Degree: FAST score unknown, need more collateral information  Active treatment: n/a  Clinical impact on complexity: significant

## 2020-04-24 NOTE — BEHAVIORAL HEALTH ASSESSMENT NOTE - OTHER PAST PSYCHIATRIC HISTORY (INCLUDE DETAILS REGARDING ONSET, COURSE OF ILLNESS, INPATIENT/OUTPATIENT TREATMENT)
h/o dementia diagnosed 6 years ago, had taken quetiapine PRN for agitation and anxiety in the past and has not needed it in the last 2-3 years.  no h/o SA, hallucinations or substance abuse, no inpt psychiatric hospitalizations

## 2020-04-24 NOTE — CONSULT NOTE ADULT - CONSULT REASON
consulted for assistance with medical decision making in the context of a patient with suspected COVID

## 2020-04-24 NOTE — BEHAVIORAL HEALTH ASSESSMENT NOTE - NSBHCHARTREVIEWLAB_PSY_A_CORE FT
11.3   7.17  )-----------( 353      ( 24 Apr 2020 08:16 )             34.8     04-24    139  |  108  |  15  ----------------------------<  127<H>  6.1<H>   |  21<L>  |  0.51    Ca    8.6      24 Apr 2020 10:29    TPro  7.6  /  Alb  2.7<L>  /  TBili  0.6  /  DBili  x   /  AST  284<H>  /  ALT  236<H>  /  AlkPhos  130<H>  04-24

## 2020-04-24 NOTE — ED PROVIDER NOTE - OBJECTIVE STATEMENT
69 yo M hx dementia, unclear add'l PMHx. Per EMS, pt has been "sick" for 3 weeks in a house with 2 covid positive family members. Per EMS pt saturating 88% on RA improved with NRB to 96%. Also per EMS rash to trunk noted. Attempted to call ph # in EMR, # is out of service 71 yo M hx dementia, unclear add'l PMHx. Per EMS, pt has been "sick" for 3 weeks in a house with 2 covid positive family members. Per EMS pt saturating 88% on RA improved with NRB to 96%. Also per EMS rash to trunk noted. Per EMS pt is at his baseline now, A&Ox0 Attempted to call ph # in EMR, # is out of service. 71 yo M hx dementia nonverbal at baseline, family denies other PMHx. Per pt's daughter: pt was short of breath, productive coughing for 1 week, lethargy. Has had "on and off" for few weeks, and has been having fever Tm 104. Has not been tested for coronavirus.  Per EMS, pt has been "sick" for 3 weeks in a house with 2 covid positive family members. Per EMS pt saturating 88% on RA improved with NRB to 96%. Also per EMS rash to trunk noted. Per EMS pt is at his baseline now, A&Ox0.Per grandson, pt's daughter Sade Roca is HCP.    PMD Kenton Sapp [phone]. 69 yo M hx dementia nonverbal at baseline, family denies other PMHx. Per pt's daughter: pt was short of breath, productive coughing for 1 week, lethargy. Has had "on and off" for few weeks, and has been having fever Tm 104. Has not been tested for coronavirus.  Per EMS, pt has been "sick" for 3 weeks in a house with 2 covid positive family members. Per EMS pt saturating 88% on RA improved with NRB to 96%. Also per EMS rash to trunk noted. Per EMS pt is at his baseline now, A&Ox0. Per grandson, pt's daughter Sade Roca is HCP.    PMD Kenton Sapp; tba miguelina steiner unless protocol dictates otherwise.

## 2020-04-24 NOTE — CONSULT NOTE ADULT - SUBJECTIVE AND OBJECTIVE BOX
CHIEF COMPLAINT:Patient is a 70y old  Male who presents with a chief complaint of sob (24 Apr 2020 11:24)      HPI:  71 yo M hx dementia nonverbal at baseline, family denies other PMHx. Per pt's daughter: pt was short of breath, productive coughing for 1 week, lethargy. Has had "on and off" for few weeks, and has been having fever Tm 104. Has not been tested for coronavirus.  Per EMS, pt has been "sick" for 3 weeks in a house with 2 covid positive family members. Per EMS pt saturating 88% on RA improved with NRB to 96%. Also per EMS rash to trunk noted. Per EMS pt is at his baseline now, A&Ox0  while in er pt bradycardic to 50.      PAST MEDICAL & SURGICAL HISTORY:  Dementia      MEDICATIONS  (STANDING):  enoxaparin Injectable 40 milliGRAM(s) SubCutaneous daily    MEDICATIONS  (PRN):      FAMILY HISTORY:      SOCIAL HISTORY:    [ ] Non-smoker  [ ] Smoker  [ ] Alcohol    Allergies    No Known Allergies    Intolerances    	    REVIEW OF SYSTEMS:  CONSTITUTIONAL: No fever, weight loss, or fatigue  EYES: No eye pain, visual disturbances, or discharge  ENT:  No difficulty hearing, tinnitus, vertigo; No sinus or throat pain  NECK: No pain or stiffness  RESPIRATORY: No cough, wheezing, chills or hemoptysis; No Shortness of Breath  CARDIOVASCULAR: No chest pain, palpitations, passing out, dizziness, or leg swelling  GASTROINTESTINAL: No abdominal or epigastric pain. No nausea, vomiting, or hematemesis; No diarrhea or constipation. No melena or hematochezia.  GENITOURINARY: No dysuria, frequency, hematuria, or incontinence  NEUROLOGICAL: No headaches, memory loss, loss of strength, numbness, or tremors  SKIN: No itching, burning, rashes, or lesions   LYMPH Nodes: No enlarged glands  ENDOCRINE: No heat or cold intolerance; No hair loss  MUSCULOSKELETAL: No joint pain or swelling; No muscle, back, or extremity pain  PSYCHIATRIC: No depression, anxiety, mood swings, or difficulty sleeping  HEME/LYMPH: No easy bruising, or bleeding gums  ALLERGY AND IMMUNOLOGIC: No hives or eczema	    [ ] All others negative	  [x ] Unable to obtain    PHYSICAL EXAM:  T(C): 37.2 (04-24-20 @ 12:10), Max: 37.6 (04-24-20 @ 07:47)  HR: 48 (04-24-20 @ 12:10) (55 - 98)  BP: 127/78 (04-24-20 @ 12:10) (123/90 - 148/85)  RR: 4 (04-24-20 @ 12:10) (4 - 4)  SpO2: 93% (04-24-20 @ 12:10) (93% - 98%)  Wt(kg): --  I&O's Summary      Appearance: Normal	  HEENT:   Normal oral mucosa, PERRL, EOMI	  Lymphatic: No lymphadenopathy  Cardiovascular: Normal S1 S2, No JVD, No murmurs, No edema  Respiratory: Lungs clear to auscultation	  Psychiatry: A & O x 3, Mood & affect appropriate  Gastrointestinal:  Soft, Non-tender, + BS	  Skin: No rashes, No ecchymoses, No cyanosis	  Neurologic: Non-focal  Extremities: Normal range of motion, No clubbing, cyanosis or edema  Vascular: Peripheral pulses palpable 2+ bilaterally    TELEMETRY: 	    ECG:  	  RADIOLOGY:  OTHER: 	  	  LABS:	 	    CARDIAC MARKERS:                              11.3   7.17  )-----------( 353      ( 24 Apr 2020 08:16 )             34.8     04-24    139  |  108  |  15  ----------------------------<  127<H>  6.1<H>   |  21<L>  |  0.51    Ca    8.6      24 Apr 2020 10:29    TPro  7.6  /  Alb  2.7<L>  /  TBili  0.6  /  DBili  x   /  AST  284<H>  /  ALT  236<H>  /  AlkPhos  130<H>  04-24    proBNP:   Lipid Profile:   HgA1c:   TSH:       PREVIOUS DIAGNOSTIC TESTING:    < from: CT Abdomen and Pelvis w/ IV Cont (04.24.20 @ 10:00) >  Right inguinal hernia that involves a portion of the bladder fundus. See above.  No evidence of bowel related inflammation nor obstruction.  Moderate bibasilar parenchymal infiltrates compatible with atypical pneumonia including Covid 19.  The pertinent findings were discussed with Dr. Díaz at the conclusion of today's evaluation.    COVID-19 PCR . (04.24.20 @ 09:22)    COVID-19 PCR: NotDetec: This test has been validated by [employer] to be accurate;  though it has not been FDA cleared/approved by the usual pathway.  As with all laboratory tests, results should be correlated with clinical  findings.  https://www.fda.gov/media/582173/download  https://www.fda.gov/media/940234/download    < from: Xray Chest 1 View- PORTABLE-Urgent (04.24.20 @ 07:48) >  Diffuse bilateral patchy opacities likely representing atypical/viral pneumonia. There is no pleural effusion or pneumothorax. The cardiac silhouette is unremarkable. Degenerative changes of the spine.    IMPRESSION: Diffuse bilateral patchy opacities likely representing atypical/viral pneumonia. COVID 19 is considered.    < from: CT Abdomen and Pelvis w/ IV Cont (04.24.20 @ 10:00) >  Right inguinal hernia that involves a portion of the bladder fundus. See above.  No evidence of bowel related inflammation nor obstruction.  Moderate bibasilar parenchymal infiltrates compatible with atypical pneumonia including Covid 19.  The pertinent findings were discussed with Dr. Díaz at the conclusion of today's evaluation. CHIEF COMPLAINT:Patient is a 70y old  Male who presents with a chief complaint of sob (24 Apr 2020 11:24)      HPI:  71 yo M hx dementia nonverbal at baseline, family denies other PMHx. Per pt's daughter: pt was short of breath, productive coughing for 1 week, lethargy. Has had "on and off" for few weeks, and has been having fever Tm 104. Has not been tested for coronavirus.  Per EMS, pt has been "sick" for 3 weeks in a house with 2 covid positive family members. Per EMS pt saturating 88% on RA improved with NRB to 96%. Also per EMS rash to trunk noted. Per EMS pt is at his baseline now, A&Ox0  while in er pt bradycardic to 50.      PAST MEDICAL & SURGICAL HISTORY:  Dementia      MEDICATIONS  (STANDING):  enoxaparin Injectable 40 milliGRAM(s) SubCutaneous daily    MEDICATIONS  (PRN):      FAMILY HISTORY:      SOCIAL HISTORY:    [ ] Non-smoker  [ ] Smoker  [ ] Alcohol    Allergies    No Known Allergies    Intolerances    	    REVIEW OF SYSTEMS:  CONSTITUTIONAL: No fever, weight loss, or fatigue  EYES: No eye pain, visual disturbances, or discharge  ENT:  No difficulty hearing, tinnitus, vertigo; No sinus or throat pain  NECK: No pain or stiffness  RESPIRATORY: No cough, wheezing, chills or hemoptysis; No Shortness of Breath  CARDIOVASCULAR: No chest pain, palpitations, passing out, dizziness, or leg swelling  GASTROINTESTINAL: No abdominal or epigastric pain. No nausea, vomiting, or hematemesis; No diarrhea or constipation. No melena or hematochezia.  GENITOURINARY: No dysuria, frequency, hematuria, or incontinence  NEUROLOGICAL: No headaches, memory loss, loss of strength, numbness, or tremors  SKIN: No itching, burning, rashes, or lesions   LYMPH Nodes: No enlarged glands  ENDOCRINE: No heat or cold intolerance; No hair loss  MUSCULOSKELETAL: No joint pain or swelling; No muscle, back, or extremity pain  PSYCHIATRIC: No depression, anxiety, mood swings, or difficulty sleeping  HEME/LYMPH: No easy bruising, or bleeding gums  ALLERGY AND IMMUNOLOGIC: No hives or eczema	    [ ] All others negative	  [x ] Unable to obtain    PHYSICAL EXAM:  T(C): 37.2 (04-24-20 @ 12:10), Max: 37.6 (04-24-20 @ 07:47)  HR: 48 (04-24-20 @ 12:10) (55 - 98)  BP: 127/78 (04-24-20 @ 12:10) (123/90 - 148/85)  RR: 4 (04-24-20 @ 12:10) (4 - 4)  SpO2: 93% (04-24-20 @ 12:10) (93% - 98%)  Wt(kg): --  I&O's Summary      Appearance: Normal	  HEENT:   Normal oral mucosa, PERRL, EOMI	  Lymphatic: No lymphadenopathy  Cardiovascular: Normal S1 S2, No JVD, No murmurs, No edema  Respiratory: Lungs clear to auscultation	  Psychiatry: A & O x 3, Mood & affect appropriate  Gastrointestinal:  Soft, Non-tender, + BS	  Skin:+ rashes on the chest wall, No ecchymoses, No cyanosis	  Neurologic: Non-focal  Extremities: Normal range of motion, No clubbing, cyanosis or edema  Vascular: Peripheral pulses palpable 2+ bilaterally    TELEMETRY: 	    ECG:  	  RADIOLOGY:  OTHER: 	  	  LABS:	 	    CARDIAC MARKERS:                              11.3   7.17  )-----------( 353      ( 24 Apr 2020 08:16 )             34.8     04-24    139  |  108  |  15  ----------------------------<  127<H>  6.1<H>   |  21<L>  |  0.51    Ca    8.6      24 Apr 2020 10:29    TPro  7.6  /  Alb  2.7<L>  /  TBili  0.6  /  DBili  x   /  AST  284<H>  /  ALT  236<H>  /  AlkPhos  130<H>  04-24    proBNP:   Lipid Profile:   HgA1c:   TSH:       PREVIOUS DIAGNOSTIC TESTING:    < from: CT Abdomen and Pelvis w/ IV Cont (04.24.20 @ 10:00) >  Right inguinal hernia that involves a portion of the bladder fundus. See above.  No evidence of bowel related inflammation nor obstruction.  Moderate bibasilar parenchymal infiltrates compatible with atypical pneumonia including Covid 19.  The pertinent findings were discussed with Dr. Díaz at the conclusion of today's evaluation.    COVID-19 PCR . (04.24.20 @ 09:22)    COVID-19 PCR: NotDetec: This test has been validated by [employer] to be accurate;  though it has not been FDA cleared/approved by the usual pathway.  As with all laboratory tests, results should be correlated with clinical  findings.  https://www.fda.gov/media/130851/download  https://www.fda.gov/media/005170/download    < from: Xray Chest 1 View- PORTABLE-Urgent (04.24.20 @ 07:48) >  Diffuse bilateral patchy opacities likely representing atypical/viral pneumonia. There is no pleural effusion or pneumothorax. The cardiac silhouette is unremarkable. Degenerative changes of the spine.    IMPRESSION: Diffuse bilateral patchy opacities likely representing atypical/viral pneumonia. COVID 19 is considered.    < from: CT Abdomen and Pelvis w/ IV Cont (04.24.20 @ 10:00) >  Right inguinal hernia that involves a portion of the bladder fundus. See above.  No evidence of bowel related inflammation nor obstruction.  Moderate bibasilar parenchymal infiltrates compatible with atypical pneumonia including Covid 19.  The pertinent findings were discussed with Dr. Díaz at the conclusion of today's evaluation.

## 2020-04-24 NOTE — H&P ADULT - ASSESSMENT
69 yo M     hx dementia nonverbal at baseline,  has trouble  ambulating  at baseline, needs help, per  family   . Per   daughter: pt   had   short of breath, productive coughing for 1 week,  with  lethargy.    s/p   fever Tm 104. Has not been tested for coronavirus.    Per EMS, pt has been "sick" for 3 weeks in a house with 2 covid positive family members/  wife and daughter.  . Per grandson, pt's daughter Sade Roca is HCP  has  not been  ambulating  for past  2  weeks, per grandson.      sob  /  fevers and    hypoxia, , from probable  Covid  cxr, with diffuse  b/l  opacities  from Covid   covid  test  in [place] , was  negative   pt   lives  in a  house,  with 2 positive  covid  family members  normal  wbc  hyperkalemia, is a  hemo lysed   specimen/  rpt bmp   on dvt ppx  on arrival  to  [facility],  was  placed  on NRM ,at  15  liters  per  family, upon discussing  goc, today,  they  stated, they  '  will  not  confirm  or decline,  at  this  time '  wt is 185  pounds  ID eval      from: Xray Chest 1 View- PORTABLE-Urgent (04.24.20 @ 07:48) >  IMPRESSION: Diffuse bilateral patchy opacities likely representing atypical/viral pneumonia. COVID 19 is considered.  < end of copied text > 71 yo M     hx dementia nonverbal at baseline,  has trouble  ambulating  at baseline, needs help, per  family   . Per   daughter: pt   had   short of breath, productive coughing for 1 week,  with  lethargy.    s/p   fever Tm 104. Has not been tested for coronavirus.    Per EMS, pt has been "sick" for 3 weeks in a house with 2 covid positive family members/  wife and daughter.  . Per grandson, pt's daughter Sade Roca is HCP  has  not been  ambulating  for past  2  weeks, per grandson.      sob  /  fevers and    hypoxia, , from probable  Covid  cxr, with diffuse  b/l  opacities  from Covid   covid  test  in [facility] , was  negative   pt   lives  in a  house,  with 2 positive  covid  family members  normal  wbc  hyperkalemia, is a  hemo lysed   specimen/  rpt bmp   on dvt ppx  on arrival  to  [facility],  was  placed  on NRM ,at  15  liters  per  family, upon discussing  goc, today,  they  stated, they  '  will  not  confirm  or decline,  at  this  time '  wt is 185  pounds   per  family   maculo papular rash, on  ant  abd  wakk,  doubt  zoster, a s  he has   few   lesion s  crossing  mid  line    covid, known to  cause  rash  Pysch  eval, for  acute delirium/  agiatation  ID eval      from: Xray Chest 1 View- PORTABLE-Urgent (04.24.20 @ 07:48) >  IMPRESSION: Diffuse bilateral patchy opacities likely representing atypical/viral pneumonia. COVID 19 is considered.  < end of copied text > 69 yo M     hx dementia nonverbal at baseline,  has trouble  ambulating  at baseline, needs help, per  family   . Per   daughter: pt   had   short of breath, productive coughing for 1 week,  with  lethargy.    s/p   fever Tm 104. Has not been tested for coronavirus.    Per EMS, pt has been "sick" for 3 weeks in a house with 2 covid positive family members/  wife and daughter.  . Per grandson, pt's daughter Sade Roca is HCP  has  not been  ambulating  for past  2  weeks, per grandson.      sob  /  fevers and    hypoxia, , from probable  Covid  cxr, with diffuse  b/l  opacities  from Covid   covid  test  in Er , was  negative   pt   lives  in a  house,  with 2 positive  covid  family members  normal  wbc  hyperkalemia, is a  hemo lysed   specimen/  rpt bmp   on dvt ppx  on arrival  to  [facility],  was  placed  on NRM ,at  15  liters  per  family, upon discussing  goc, today,  they  stated, they  '  will  not  confirm  or decline,  at  this  time '  wt is 185  pounds.  /  hr in  40/s/  card eval   per  family   maculo papular rash, on  ant  abd  wall,   doubt  zoster, a s  he has   few   lesion s  crossing  mid  line    covid, known to  cause  rash  Pysch  eval, for  acute delirium/  agitation   ID eval      from: Xray Chest 1 View- PORTABLE-Urgent (04.24.20 @ 07:48) >  IMPRESSION: Diffuse bilateral patchy opacities likely representing atypical/viral pneumonia. COVID 19 is considered.  < end of copied text >

## 2020-04-24 NOTE — BEHAVIORAL HEALTH ASSESSMENT NOTE - NSBHCHARTREVIEWVS_PSY_A_CORE FT
Vital Signs Last 24 Hrs  T(C): 37.2 (24 Apr 2020 12:10), Max: 37.6 (24 Apr 2020 07:47)  T(F): 98.9 (24 Apr 2020 12:10), Max: 99.7 (24 Apr 2020 07:47)  HR: 55 (24 Apr 2020 12:10) (55 - 98)  BP: 127/78 (24 Apr 2020 12:10) (123/90 - 148/85)  BP(mean): --  RR: 4 (24 Apr 2020 12:10) (4 - 4)  SpO2: 93% (24 Apr 2020 12:10) (93% - 98%)

## 2020-04-24 NOTE — ED ADULT NURSE NOTE - OBJECTIVE STATEMENT
patient is a 70 year old male with a PMH of dementia who presents to the ED from home via EMS complaining of shortness of breath. as per EMS both family members at home are currently covid positive. patient has been sick for 20 days. he has been getting nebulizer treatments at home for he shortness of breath but it was not relieving the shortness of breath this morninig. as per EMS patient is presenting to the ED at their baseline mental status. patient is awake, alert, lungs diminished bilaterally, abd soft, distended, tender to touch, cap refill <3, radial pulses +2 bilaterally. upon assessment patient has a difussed rash along his abdomen bilaterally from an unknown source. blisters are red, warm to touch and raised. blisters are not draining at this time. patient resting comfortably on stretcher. patient NSR on monitor. patient NSR on monitor. patient comfort and safety provided. will continue to monitor.

## 2020-04-24 NOTE — BEHAVIORAL HEALTH ASSESSMENT NOTE - HPI (INCLUDE ILLNESS QUALITY, SEVERITY, DURATION, TIMING, CONTEXT, MODIFYING FACTORS, ASSOCIATED SIGNS AND SYMPTOMS)
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  Of note, per primary team, patient agitated in the ER, trying to get up out of bed, was given Ativan x 1 dose for agitation.  Again patient was agitated restless in bed, continues to try to get up out of bed and was medicated with Haldol x 1 dose.  Psychiatry consulted to assist with management of agitation.    Patient seen and evaluated, used  Leno ID# [id number], patient is awake, with one restraint mitten on, is restless in bed, waving his hands touching different areas of his torso area.  When asked patient's name, patient mumbles incoherently. When asked if he was in pain patient does not respond and continues to moan and mumble incoherently, appears to be anxious, unable to engage in a meaningful conversation.    Spoke with patient's grandson, Zenon ([phone]), who reports patient has h/o dementia which was diagnosed 6 years ago.  States that patient has taken quetiapine in the past as needed (he is unable to provide dosage), for "agitation", states that patient would have a "panic attack" when he became confused and didn't know where he was.  Reports taking this medications as needed, however has not needed to take it for the last 2-3 years.  He reports patient has no other PPHx, no h/o depression, never took any other psychiatric medications in the past.  He reports that at baseline, patient is nonverbal for the past 2-3 years, however family is able to tell by his body language about his particular needs.  He reports at baseline that patient does not know where he is or the current date, however still recognizes family members.  He reports patient lives in home with his spouse, daughter and grandchildren, who assist him with his day to day routines, they deny patient has a HHA.  He reports patient has no h/o SA, no h/o hallucinations such as having visual or auditory disturbances.  He denies patient has any h/o substance or alcohol abuse, denies patient smokes nicotine.  He denies patient has any history of physical aggression.  He denies that patient being increasing agitated recently prior to this hospital admission, states patient's behaviors prior to admission were at baseline.  He denies patient has access to guns/weapons.

## 2020-04-24 NOTE — CONSULT NOTE ADULT - ASSESSMENT
69 yo M hx dementia nonverbal at baseline, family denies other PMHx. Per pt's daughter: pt was short of breath, productive coughing for 1 week, lethargy. Has had "on and off" for few weeks, and has been having fever Tm 104. Has not been tested for coronavirus.  Per EMS, pt has been "sick" for 3 weeks in a house with 2 covid positive family members. Per EMS pt saturating 88% on RA improved with NRB to 96%. Also per EMS rash to trunk noted. Per EMS pt is at his baseline now, A&Ox0  while in er pt bradycardic to 50.  pt is comfortable  even covid test is negative , but pt ct/ x ray is c/w  covid  bradycardia ?etiology ?hyperkalemia, correct k level  no beta blocker  increase LFT ?sec to COVID  will call family to get a better history specially meds  dvt prophylaxis  Id recommendation  awaiting ecg 71 yo M hx dementia nonverbal at baseline, family denies other PMHx. Per pt's daughter: pt was short of breath, productive coughing for 1 week, lethargy. Has had "on and off" for few weeks, and has been having fever Tm 104. Has not been tested for coronavirus.  Per EMS, pt has been "sick" for 3 weeks in a house with 2 covid positive family members. Per EMS pt saturating 88% on RA improved with NRB to 96%. Also per EMS rash to trunk noted. Per EMS pt is at his baseline now, A&Ox0  while in er pt bradycardic to 50.  pt is comfortable  even covid test is negative , but pt ct/ x ray is c/w  covid  bradycardia ?etiology ?hyperkalemia, correct k level  no beta blocker  increase LFT ?sec to COVID  will call family to get a better history specially meds  dvt prophylaxis  Id recommendation/ rash  awaiting ecg

## 2020-04-24 NOTE — CONSULT NOTE ADULT - PROBLEM SELECTOR RECOMMENDATION 3
PPSv2 prior to admission:  Current functional PPSv2:  Nursing care required:  Code status documented:  A review of the paper chart has been conducted  HCP form present:               Yes and valid []               Yes but invalid []                No []   MOLST present:                   Yes and valid []               Yes but invalid []                No []  Incapacity form present:   Yes and valid []                Yes but invalid []                No []                 N/A []  Living Will:                            Yes and valid []                Yes but invalid []                No []      Family Health Care Decision Act (FHCDA) Surrogate Decision Maker Hierarchy in the absence of a health care agent  [facility] Article 81 Guardian-->Spouse or domestic partner--> Adult child-->Parent-->Sibling--> Close friend

## 2020-04-24 NOTE — ED ADULT NURSE NOTE - CHIEF COMPLAINT QUOTE
sob ra 90 %  h/o dementia   exposed to covid sob ra 90 %    lauren Roca [phone] / Salina Sapp  h/o dementia   exposed to covid

## 2020-04-24 NOTE — H&P ADULT - NSHPLABSRESULTS_GEN_ALL_CORE
LABS:                        11.3   7.17  )-----------( 353      ( 24 Apr 2020 08:16 )             34.8     04-24    139  |  108  |  15  ----------------------------<  127<H>  6.1<H>   |  21<L>  |  0.51    Ca    8.6      24 Apr 2020 10:29    TPro  7.6  /  Alb  2.7<L>  /  TBili  0.6  /  DBili  x   /  AST  284<H>  /  ALT  236<H>  /  AlkPhos  130<H>  04-24

## 2020-04-24 NOTE — CONSULT NOTE ADULT - PROBLEM SELECTOR RECOMMENDATION 5
Actions:  [] Rapport building     [] Symptom assessment    [] Eliciting preferences of goals   [] Prognostic understanding    [] Emotional Support  [] Coping skill development  []  Other  Interdisciplinary Referrals:  Communication:  Documentation Review: [] Primary Team [] Consultants [] Interdisciplinary team  Content: Actions:  [] Rapport building     [] Symptom assessment    [] Eliciting preferences of goals   [] Prognostic understanding    [] Emotional Support  [] Coping skill development  [x]  Other discussion with the primary team  Interdisciplinary Referrals: TBD  Communication: d/w NP  Documentation Review: [x] Primary Team [] Consultants [] Interdisciplinary team  Content: Previous goals of care attempted

## 2020-04-24 NOTE — GOALS OF CARE CONVERSATION - ADVANCED CARE PLANNING - CONVERSATION DETAILS
Number in EMR for Sade was incorrect  Proper/current number [phone]    Grandson Santos was available via telephone and he provided useful collateral about the grandfather and his dementia NOS (no formal diagnosis) FAST score not consistent with hospice candidacy. Reviewed some basics of the patient's care and high suspicion for COVID despite negative test. Discussed how to maximize engagement/understanding when requesting updates. The grandson had many questions about intubation. We spoke at length about intubation, namely indications, candidacy for intubation, periintubation sedation, temporary nature of intubation, weaning trials, LT mechnical ventilation,  palliative extubation. He claims the family has made no decision.  Reviewed Family health care decision act with Santos. I explained that in the absence of a healthcare proxy the patient's living spouse would be the surrogate.  He conferred with his mother, Sade, who claims to be the HCP and have formal HCP paperwork. I urged him to have his mother find the paperwork and reviewed [facility]'s decision maker hierarchy. Sade is attempting to secure the document to provide to [facility] staff.        Action Items:  Pt has ? dysphagia diet at home, fed using utensils, sometimes jello.   Mgmt per primary team  Will follow    Referrals  Recommend primary team places the following consults  SW consult, family would like to do a video conference call as soon as possible with the patient  Chaplaincy to facilitate a bilingual Protestant / to facilitate prayer in Welsh        Total time spent including the following  [x]chart review  []care coordination  x[x]discussion with the primary team  x[x]discussion with the patient, surrogate decision maker, or family  Time spent: > 35 min

## 2020-04-24 NOTE — ED ADULT NURSE REASSESSMENT NOTE - NS ED NURSE REASSESS COMMENT FT1
patient straight cathed for urine. 200cc of dark yellow urine obtained. patient tolerated well. sterile technique used and maintained. will continue to monitor. patient comfort and safety provided.

## 2020-04-24 NOTE — BEHAVIORAL HEALTH ASSESSMENT NOTE - SUMMARY
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  Of note, per primary team, patient agitated in the ER, trying to get up out of bed, was given Ativan x 1 dose for agitation.  Again patient was agitated restless in bed, continues to try to get up out of bed and was medicated with Haldol x 1 dose.  Psychiatry consulted to assist with management of agitation.

## 2020-04-24 NOTE — ED PROVIDER NOTE - CLINICAL SUMMARY MEDICAL DECISION MAKING FREE TEXT BOX
ATTG: : breathing difficulty, concern for infection, cough concern for pna / COVID-19, check labs, check x-ray check EKG, APAP, supplemental O2, re eval for dispo Resident:69 yo M p/w cough, fever, hypoxic at home. On exam in moderate disterss w/ crackles b/l. Suspect COVID PNA vs other bacterial PNA vs UTI  or metabolic abnormality causing encephalopathy    ATTG: : breathing difficulty, concern for infection, cough concern for pna / COVID-19, check labs, check x-ray check EKG, APAP, supplemental O2, re eval for dispo

## 2020-04-24 NOTE — BEHAVIORAL HEALTH ASSESSMENT NOTE - DETAILS
n/a patient recently agitated on this admission, trying to get up out of bed patient on nasal canula

## 2020-04-24 NOTE — ED PROVIDER NOTE - ATTENDING CONTRIBUTION TO CARE
69 y/o m with pmhx dementia, presents by EMS from home for worsening breathing difficulty. per EMS, patient has 2 fam members that are COVID + and have been caring for him over the last 3 weeks. patient unable to offer complaints. no known advanced directives. initial 02 sat 91% improved on NRB. EMS notes a rash on ant abd that began 2 days ago  Gen.  elderly male, mild resp distress  HEENT: dry mmm   Lungs:  b/l crackles  CVS: S1S2   Abd;  soft., diffuse tender  Ext: no pitting edema  Neuro: non verbal. follows commands intermittently  MSK: moves ext spon.  Skin: punctate areas of erythema

## 2020-04-24 NOTE — CONSULT NOTE ADULT - PROBLEM SELECTOR RECOMMENDATION 4
COVID Status: [] Positive [] Person of interest  Mechanical Ventilation []   Yes   []  No  Vasopressor support [] Yes    []  No    Artificial Nutrition Modality [] OGT/NGT  [] PEG   COVID Treatment if applicable: COVID Status: [] Positive [x] Person of interest-due to contact history, PCR negative to date  Mechanical Ventilation []   Yes   [x]  No  Vasopressor support [] Yes    [x]  No  Artificial Nutrition Modality [] OGT/NGT  [] PEG [x] N/A  COVID Treatment if applicable: ? trial involvement

## 2020-04-24 NOTE — ED ADULT NURSE REASSESSMENT NOTE - NS ED NURSE REASSESS COMMENT FT1
MD at bedside to swab patient. patient tolerated well. will continue to monitor. patient comfort and safety provided.

## 2020-04-25 LAB
ANION GAP SERPL CALC-SCNC: 12 MMOL/L — SIGNIFICANT CHANGE UP (ref 5–17)
BUN SERPL-MCNC: 15 MG/DL — SIGNIFICANT CHANGE UP (ref 7–23)
CALCIUM SERPL-MCNC: 8.9 MG/DL — SIGNIFICANT CHANGE UP (ref 8.4–10.5)
CHLORIDE SERPL-SCNC: 107 MMOL/L — SIGNIFICANT CHANGE UP (ref 96–108)
CO2 SERPL-SCNC: 22 MMOL/L — SIGNIFICANT CHANGE UP (ref 22–31)
CREAT SERPL-MCNC: 0.62 MG/DL — SIGNIFICANT CHANGE UP (ref 0.5–1.3)
CULTURE RESULTS: NO GROWTH — SIGNIFICANT CHANGE UP
FOLATE SERPL-MCNC: 19.3 NG/ML — SIGNIFICANT CHANGE UP
GLUCOSE SERPL-MCNC: 139 MG/DL — HIGH (ref 70–99)
HCT VFR BLD CALC: 35 % — LOW (ref 39–50)
HCV AB S/CO SERPL IA: 0.15 S/CO — SIGNIFICANT CHANGE UP (ref 0–0.99)
HCV AB SERPL-IMP: SIGNIFICANT CHANGE UP
HGB BLD-MCNC: 11.7 G/DL — LOW (ref 13–17)
HSV+VZV DNA SPEC QL NAA+PROBE: SIGNIFICANT CHANGE UP
MCHC RBC-ENTMCNC: 30.6 PG — SIGNIFICANT CHANGE UP (ref 27–34)
MCHC RBC-ENTMCNC: 33.4 GM/DL — SIGNIFICANT CHANGE UP (ref 32–36)
MCV RBC AUTO: 91.6 FL — SIGNIFICANT CHANGE UP (ref 80–100)
NRBC # BLD: 0 /100 WBCS — SIGNIFICANT CHANGE UP (ref 0–0)
PLATELET # BLD AUTO: 377 K/UL — SIGNIFICANT CHANGE UP (ref 150–400)
POTASSIUM SERPL-MCNC: 3.8 MMOL/L — SIGNIFICANT CHANGE UP (ref 3.5–5.3)
POTASSIUM SERPL-SCNC: 3.8 MMOL/L — SIGNIFICANT CHANGE UP (ref 3.5–5.3)
RBC # BLD: 3.82 M/UL — LOW (ref 4.2–5.8)
RBC # FLD: 14.4 % — SIGNIFICANT CHANGE UP (ref 10.3–14.5)
SODIUM SERPL-SCNC: 141 MMOL/L — SIGNIFICANT CHANGE UP (ref 135–145)
SPECIMEN SOURCE: SIGNIFICANT CHANGE UP
SPECIMEN SOURCE: SIGNIFICANT CHANGE UP
T PALLIDUM AB TITR SER: NEGATIVE — SIGNIFICANT CHANGE UP
TSH SERPL-MCNC: 0.86 UIU/ML — SIGNIFICANT CHANGE UP (ref 0.27–4.2)
VIT B12 SERPL-MCNC: 1230 PG/ML — SIGNIFICANT CHANGE UP (ref 232–1245)
WBC # BLD: 7.82 K/UL — SIGNIFICANT CHANGE UP (ref 3.8–10.5)
WBC # FLD AUTO: 7.82 K/UL — SIGNIFICANT CHANGE UP (ref 3.8–10.5)

## 2020-04-25 PROCEDURE — 99232 SBSQ HOSP IP/OBS MODERATE 35: CPT | Mod: CS

## 2020-04-25 RX ORDER — ACETAMINOPHEN 500 MG
1000 TABLET ORAL ONCE
Refills: 0 | Status: COMPLETED | OUTPATIENT
Start: 2020-04-25 | End: 2020-04-25

## 2020-04-25 RX ADMIN — Medication 400 MILLIGRAM(S): at 23:00

## 2020-04-25 RX ADMIN — Medication 400 MILLIGRAM(S): at 09:24

## 2020-04-25 RX ADMIN — Medication 1 APPLICATION(S): at 15:58

## 2020-04-25 RX ADMIN — Medication 1 APPLICATION(S): at 05:25

## 2020-04-25 RX ADMIN — ALBUTEROL 2 PUFF(S): 90 AEROSOL, METERED ORAL at 15:57

## 2020-04-25 RX ADMIN — Medication 400 MILLIGRAM(S): at 16:51

## 2020-04-25 RX ADMIN — ALBUTEROL 2 PUFF(S): 90 AEROSOL, METERED ORAL at 09:00

## 2020-04-25 RX ADMIN — HALOPERIDOL DECANOATE 0.5 MILLIGRAM(S): 100 INJECTION INTRAMUSCULAR at 16:50

## 2020-04-25 RX ADMIN — ENOXAPARIN SODIUM 40 MILLIGRAM(S): 100 INJECTION SUBCUTANEOUS at 12:16

## 2020-04-25 RX ADMIN — Medication 200 MILLIGRAM(S): at 17:40

## 2020-04-25 RX ADMIN — SODIUM CHLORIDE 75 MILLILITER(S): 9 INJECTION, SOLUTION INTRAVENOUS at 09:01

## 2020-04-25 NOTE — PHYSICAL THERAPY INITIAL EVALUATION ADULT - PASSIVE RANGE OF MOTION EXAMINATION, REHAB EVAL
bilateral lower extremity Passive ROM was WFL (within functional limits)/bilateral upper extremity Passive ROM was WFL (within functional limits) left hand in mitt/bilateral lower extremity Passive ROM was WFL (within functional limits)/bilateral upper extremity Passive ROM was WFL (within functional limits)

## 2020-04-25 NOTE — PHYSICAL THERAPY INITIAL EVALUATION ADULT - GAIT TRAINING, PT EVAL
Patient will ambulate 300 feet to minimum assist x 1 with appropriate assistive device as needed, in 4 weeks.

## 2020-04-25 NOTE — PHYSICAL THERAPY INITIAL EVALUATION ADULT - LEVEL OF INDEPENDENCE: SUPINE/SIT, REHAB EVAL
dependent (less than 25% patients effort)/patient resistive throughout bed mobility pushing himself backwards

## 2020-04-25 NOTE — PROGRESS NOTE ADULT - ASSESSMENT
71 yo M     hx dementia nonverbal at baseline,  has trouble  ambulating  at baseline, needs help, per  family   . Per   daughter: pt   had   short of breath, productive coughing for 1 week,  with  lethargy.    s/p   fever Tm 104. Has not been tested for coronavirus.    Per EMS, pt has been "sick" for 3 weeks in a house with 2 covid positive family members/  wife and daughter.  . Per grandson, pt's daughter Sade Roca is HCP  has  not been  ambulating  for past  2  weeks, per grandson.      sob  /  fevers and    hypoxia, , from probable  Covid  cxr, with diffuse  b/l  opacities  from Covid   covid  test  in Er , was  negative   pt   lives  in a  house,  with 2 positive  covid  family members    per  family, upon discussing  goc,   they  stated, they  '  will  not  confirm  or decline,  at  this  time '  seen by  PCU dr Hubbard/ pt remains  full code , at  this  time     maculo papular rash, on  ant  abd  wall,   doubt  zoster, as   he has few  lesions  crossing  mid  line    covid, known to  cause  rash  ct  noted., right  inguinal hernia  with bladder involvement  seen by  urology,   no  intervention needed  as  hernia  is  c/c , and    stable, defer  elective  surgery  at present  Pylai barbour, noted,  pt with prior  h/o  dementia   and  agitation  on haldol prn , for   acute delirium/  agitation   follow  blood   c/s/ HSV  result      from: Xray Chest 1 View- PORTABLE-Urgent (04.24.20 @ 07:48) >  IMPRESSION: Diffuse bilateral patchy opacities likely representing atypical/viral pneumonia. COVID 19 is considered.  < end of copied text >      < from: CT Abdomen and Pelvis w/ IV Cont (04.24.20 @ 10:00) >  IMPRESSION:   Right inguinal hernia that involves a portion of the bladder fundus. See above.  No evidence of bowel related inflammation nor obstruction.  Moderate bibasilar parenchymal infiltrates compatible with atypical pneumonia including Covid 19.  The pertinent findings were discussed with Dr. Díaz at the conclusion of today's evaluation    < end of copied text > 71 yo M     hx dementia nonverbal at baseline,  has trouble  ambulating  at baseline, needs help, per  family   . Per   daughter: pt   had   short of breath, productive coughing for 1 week,  with  lethargy.    s/p   fever Tm 104. Has not been tested for coronavirus.    Per EMS, pt has been "sick" for 3 weeks in a house with 2 covid positive family members/  wife and daughter.  . Per grandson, pt's daughter Sade Roca is HCP  has  not been  ambulating  for past  2  weeks, per grandson.      sob  /  fevers and    hypoxia, , from probable  Covid  cxr, with diffuse  b/l  opacities  from Covid   covid  test  in Er , was  negative   pt   lives  in a  house,  with 2 positive  covid  family members    per  family, upon discussing  goc,   they  stated, they  '  will  not  confirm  or decline,  at  this  time '  seen by  PCU dr Hubbard/ pt remains  full code , at  this  time     maculo papular rash, on  ant  abd  wall, HSV, is  negative    covid, known to  cause  rash  ct  noted., right  inguinal hernia  with bladder involvement  seen by  urology,   no  intervention needed  as  hernia  is  c/c , and    stable, defer  elective  surgery  at present  Caroline barbour, noted,  pt with prior  h/o  dementia   and  agitation  on haldol prn , for   acute delirium/  agitation   follow  blood   c/s/  febrile/ ID  to  f/p      from: Xray Chest 1 View- PORTABLE-Urgent (04.24.20 @ 07:48) >  IMPRESSION: Diffuse bilateral patchy opacities likely representing atypical/viral pneumonia. COVID 19 is considered.  < end of copied text >      < from: CT Abdomen and Pelvis w/ IV Cont (04.24.20 @ 10:00) >  IMPRESSION:   Right inguinal hernia that involves a portion of the bladder fundus. See above.  No evidence of bowel related inflammation nor obstruction.  Moderate bibasilar parenchymal infiltrates compatible with atypical pneumonia including Covid 19.  The pertinent findings were discussed with Dr. Díaz at the conclusion of today's evaluation    < end of copied text >

## 2020-04-25 NOTE — PHYSICAL THERAPY INITIAL EVALUATION ADULT - ASR EQUIP NEEDS DISCH PT EVAL
family does not use assistive devices with the patient & prefers to hold him instead of using a Rolling Walker

## 2020-04-25 NOTE — PHYSICAL THERAPY INITIAL EVALUATION ADULT - PRECAUTIONS/LIMITATIONS, REHAB EVAL
CC:  Hiram Granger is here today for Follow-up (post 04/11/2018 ED visit-Hives)    Medications: medications verified and updated  Refills needed today? NO  Preferred pharmacy added   Denies Latex allergy or sensitivity  Tobacco history: verified    Patient would like communication of their results via:    Cell Phone:   Telephone Information:   Mobile [phone] to leave a message containing results? Yes  There are no preventive care reminders to display for this patient. Patient is due for topics as listed above, he wishes to discuss with provider. Pt was told possible reaction from a detergent. Pt took prednisone as directed. Pt states hives have resolved however itching of skin continues.     Pt would like to discuss consult with allergist. no known precautions/limitations Covid 19

## 2020-04-25 NOTE — PROGRESS NOTE ADULT - SUBJECTIVE AND OBJECTIVE BOX
CARDIOLOGY     PROGRESS  NOTE   ________________________________________________    CHIEF COMPLAINT:Patient is a 70y old  Male who presents with a chief complaint of sob (25 Apr 2020 08:50)  agitated,.  	  REVIEW OF SYSTEMS:  CONSTITUTIONAL: No fever, weight loss, or fatigue  EYES: No eye pain, visual disturbances, or discharge  ENT:  No difficulty hearing, tinnitus, vertigo; No sinus or throat pain  NECK: No pain or stiffness  RESPIRATORY: No cough, wheezing, chills or hemoptysis;+ Shortness of Breath  CARDIOVASCULAR: No chest pain, palpitations, passing out, dizziness, or leg swelling  GASTROINTESTINAL: No abdominal or epigastric pain. No nausea, vomiting, or hematemesis; No diarrhea or constipation. No melena or hematochezia.  GENITOURINARY: No dysuria, frequency, hematuria, or incontinence  NEUROLOGICAL: No headaches, memory loss, loss of strength, numbness, or tremors  SKIN: No itching, burning, rashes, or lesions   LYMPH Nodes: No enlarged glands  ENDOCRINE: No heat or cold intolerance; No hair loss  MUSCULOSKELETAL: No joint pain or swelling; No muscle, back, or extremity pain  PSYCHIATRIC: No depression, anxiety, mood swings, or difficulty sleeping  HEME/LYMPH: No easy bruising, or bleeding gums  ALLERGY AND IMMUNOLOGIC: No hives or eczema	    [ ] All others negative	  [x ] Unable to obtain    PHYSICAL EXAM:  T(C): 38.3 (04-25-20 @ 08:59), Max: 38.7 (04-24-20 @ 15:35)  HR: 66 (04-25-20 @ 10:31) (60 - 96)  BP: 146/81 (04-25-20 @ 09:05) (124/68 - 146/81)  RR: 26 (04-25-20 @ 09:05) (26 - 30)  SpO2: 92% (04-25-20 @ 10:31) (91% - 94%)  Wt(kg): --  I&O's Summary    24 Apr 2020 07:01  -  25 Apr 2020 07:00  --------------------------------------------------------  IN: 1425 mL / OUT: 850 mL / NET: 575 mL    25 Apr 2020 07:01  -  25 Apr 2020 12:28  --------------------------------------------------------  IN: 0 mL / OUT: 200 mL / NET: -200 mL        Appearance: Normal	  HEENT:   Normal oral mucosa, PERRL, EOMI	  Lymphatic: No lymphadenopathy  Cardiovascular: Normal S1 S2, No JVD,+ murmurs, No edema  Respiratory: Lungs clear to auscultation	  Psychiatry: A & O x 3, Mood & affect appropriate  Gastrointestinal:  Soft, Non-tender, + BS	  Skin:+ rashes, No ecchymoses, No cyanosis	  Neurologic: Non-focal  Extremities: Normal range of motion, No clubbing, cyanosis or edema  Vascular: Peripheral pulses palpable 2+ bilaterally    MEDICATIONS  (STANDING):  betamethasone valerate 0.1% Cream 1 Application(s) Topical two times a day  dextrose 5% + sodium chloride 0.45%. 1000 milliLiter(s) (75 mL/Hr) IV Continuous <Continuous>  enoxaparin Injectable 40 milliGRAM(s) SubCutaneous daily  melatonin 3 milliGRAM(s) Oral at bedtime      TELEMETRY: 	    ECG:  	  RADIOLOGY:  OTHER: 	  	  LABS:	 	    CARDIAC MARKERS:                                11.7   7.82  )-----------( 377      ( 25 Apr 2020 09:25 )             35.0     04-25    141  |  107  |  15  ----------------------------<  139<H>  3.8   |  22  |  0.62    Ca    8.9      25 Apr 2020 09:25    TPro  7.6  /  Alb  2.7<L>  /  TBili  0.6  /  DBili  x   /  AST  284<H>  /  ALT  236<H>  /  AlkPhos  130<H>  04-24    proBNP:   Lipid Profile:   HgA1c:   TSH: Thyroid Stimulating Hormone, Serum: 0.86 uIU/mL (04-25 @ 09:41)    Perform blood cx  U/A with no pyuria, urine cx in lab.   trend markers of inflammation  supportive care.   If oxygen requirement increases and pt ends up requiring NRB can consider dose of Tocilizumab.   rash, not classic for shingles, but ?, performed VZV PCR.         Assessment and plan  ---------------------------  71 yo M hx dementia nonverbal at baseline, family denies other PMHx. Per pt's daughter: pt was short of breath, productive coughing for 1 week, lethargy. Has had "on and off" for few weeks, and has been having fever Tm 104. Has not been tested for coronavirus.  Per EMS, pt has been "sick" for 3 weeks in a house with 2 covid positive family members. Per EMS pt saturating 88% on RA improved with NRB to 96%. Also per EMS rash to trunk noted. Per EMS pt is at his baseline now, A&Ox0  while in er pt bradycardic to 50.  pt is comfortable  even covid test is negative , but pt ct/ x ray is c/w  covid  bradycardia ?etiology ?hyperkalemia, correct k level  no beta blocker  increase LFT ?sec to COVID  will call family to get a better history specially meds  dvt prophylaxis  Id recommendation/ rash noted  awaiting ecg, no ecg in the system will check chart  bradycardia improved.

## 2020-04-25 NOTE — PROGRESS NOTE ADULT - SUBJECTIVE AND OBJECTIVE BOX
[facility]  [facility]  low  grade fever  REVIEW OF SYSTEMS:  GEN: no fever,    no chills  RESP: no SOB,   no cough  CVS: no chest pain,   no palpitations  GI: no abdominal pain,   no nausea,   no vomiting,   no constipation,   no diarrhea  : no dysuria,   no frequency  NEURO: no headache,   no dizziness  PSYCH: no depression,   not anxious  Derm : no rash    MEDICATIONS  (STANDING):  betamethasone valerate 0.1% Cream 1 Application(s) Topical two times a day  dextrose 5% + sodium chloride 0.45%. 1000 milliLiter(s) (75 mL/Hr) IV Continuous <Continuous>  enoxaparin Injectable 40 milliGRAM(s) SubCutaneous daily  melatonin 3 milliGRAM(s) Oral at bedtime    MEDICATIONS  (PRN):  ALBUTerol    90 MICROgram(s) HFA Inhaler 2 Puff(s) Inhalation every 4 hours PRN Wheezing  haloperidol    Injectable 0.5 milliGRAM(s) IntraMuscular every 8 hours PRN Agitation  hydrOXYzine hydrochloride 25 milliGRAM(s) Oral daily PRN Itching      Vital Signs Last 24 Hrs  T(C): 36.5 (2020 05:12), Max: 38.7 (2020 15:35)  T(F): 97.7 (2020 05:12), Max: 101.6 (2020 15:35)  HR: 60 (2020 05:12) (55 - 96)  BP: 124/68 (2020 05:12) (123/90 - 138/76)  BP(mean): --  RR: 26 (2020 05:12) (26 - 30)  SpO2: 92% (2020 05:12) (92% - 95%)  CAPILLARY BLOOD GLUCOSE        I&O's Summary    2020 07:01  -  2020 07:00  --------------------------------------------------------  IN: 1425 mL / OUT: 850 mL / NET: 575 mL        PHYSICAL EXAM:  HEAD:  Atraumatic, Normocephalic  NECK: Supple, No   JVD  CHEST/LUNG:   no     rales,     no,    rhonchi  HEART: Regular rate and rhythm;         murmur  ABDOMEN: Soft, Nontender, ;   EXTREMITIES:    no    edema  NEUROLOGY:   dementia/  confused    LABS:                        11.3   7.17  )-----------( 353      ( 2020 08:16 )             34.8         139  |  108  |  15  ----------------------------<  127<H>  6.1<H>   |  21<L>  |  0.51    Ca    8.6      2020 10:29    TPro  7.6  /  Alb  2.7<L>  /  TBili  0.6  /  DBili  x   /  AST  284<H>  /  ALT  236<H>  /  AlkPhos  130<H>            Urinalysis Basic - ( 2020 17:55 )    Color: Yellow / Appearance: Clear / S.044 / pH: x  Gluc: x / Ketone: Trace  / Bili: Negative / Urobili: 4 mg/dL   Blood: x / Protein: 30 mg/dL / Nitrite: Negative   Leuk Esterase: Negative / RBC: 3 /hpf / WBC 1 /HPF   Sq Epi: x / Non Sq Epi: 2 /hpf / Bacteria: Negative           @ 13:03  3.7  <20              Consultant(s) Notes Reviewed:      Care Discussed with Consultants/Other Providers:

## 2020-04-26 LAB
ANION GAP SERPL CALC-SCNC: 15 MMOL/L — SIGNIFICANT CHANGE UP (ref 5–17)
BUN SERPL-MCNC: 20 MG/DL — SIGNIFICANT CHANGE UP (ref 7–23)
CALCIUM SERPL-MCNC: 9 MG/DL — SIGNIFICANT CHANGE UP (ref 8.4–10.5)
CHLORIDE SERPL-SCNC: 104 MMOL/L — SIGNIFICANT CHANGE UP (ref 96–108)
CO2 SERPL-SCNC: 22 MMOL/L — SIGNIFICANT CHANGE UP (ref 22–31)
CREAT SERPL-MCNC: 0.7 MG/DL — SIGNIFICANT CHANGE UP (ref 0.5–1.3)
CRP SERPL-MCNC: 25.71 MG/DL — HIGH (ref 0–0.4)
D DIMER BLD IA.RAPID-MCNC: 8843 NG/ML DDU — HIGH
FERRITIN SERPL-MCNC: 3356 NG/ML — HIGH (ref 30–400)
GLUCOSE SERPL-MCNC: 100 MG/DL — HIGH (ref 70–99)
HCT VFR BLD CALC: 38.1 % — LOW (ref 39–50)
HGB BLD-MCNC: 12.4 G/DL — LOW (ref 13–17)
MCHC RBC-ENTMCNC: 30 PG — SIGNIFICANT CHANGE UP (ref 27–34)
MCHC RBC-ENTMCNC: 32.5 GM/DL — SIGNIFICANT CHANGE UP (ref 32–36)
MCV RBC AUTO: 92.3 FL — SIGNIFICANT CHANGE UP (ref 80–100)
NRBC # BLD: 0 /100 WBCS — SIGNIFICANT CHANGE UP (ref 0–0)
PLATELET # BLD AUTO: 421 K/UL — HIGH (ref 150–400)
POTASSIUM SERPL-MCNC: 4.2 MMOL/L — SIGNIFICANT CHANGE UP (ref 3.5–5.3)
POTASSIUM SERPL-SCNC: 4.2 MMOL/L — SIGNIFICANT CHANGE UP (ref 3.5–5.3)
RBC # BLD: 4.13 M/UL — LOW (ref 4.2–5.8)
RBC # FLD: 14.4 % — SIGNIFICANT CHANGE UP (ref 10.3–14.5)
SODIUM SERPL-SCNC: 141 MMOL/L — SIGNIFICANT CHANGE UP (ref 135–145)
WBC # BLD: 6.92 K/UL — SIGNIFICANT CHANGE UP (ref 3.8–10.5)
WBC # FLD AUTO: 6.92 K/UL — SIGNIFICANT CHANGE UP (ref 3.8–10.5)

## 2020-04-26 PROCEDURE — 99231 SBSQ HOSP IP/OBS SF/LOW 25: CPT

## 2020-04-26 PROCEDURE — 99232 SBSQ HOSP IP/OBS MODERATE 35: CPT

## 2020-04-26 PROCEDURE — 99232 SBSQ HOSP IP/OBS MODERATE 35: CPT | Mod: CS

## 2020-04-26 RX ORDER — ACETAMINOPHEN 500 MG
1000 TABLET ORAL ONCE
Refills: 0 | Status: COMPLETED | OUTPATIENT
Start: 2020-04-26 | End: 2020-04-26

## 2020-04-26 RX ORDER — TAMSULOSIN HYDROCHLORIDE 0.4 MG/1
0.4 CAPSULE ORAL AT BEDTIME
Refills: 0 | Status: DISCONTINUED | OUTPATIENT
Start: 2020-04-26 | End: 2020-05-02

## 2020-04-26 RX ADMIN — TAMSULOSIN HYDROCHLORIDE 0.4 MILLIGRAM(S): 0.4 CAPSULE ORAL at 21:37

## 2020-04-26 RX ADMIN — Medication 1 APPLICATION(S): at 17:07

## 2020-04-26 RX ADMIN — Medication 1 APPLICATION(S): at 06:30

## 2020-04-26 RX ADMIN — Medication 400 MILLIGRAM(S): at 22:00

## 2020-04-26 RX ADMIN — Medication 3 MILLIGRAM(S): at 21:37

## 2020-04-26 RX ADMIN — ENOXAPARIN SODIUM 40 MILLIGRAM(S): 100 INJECTION SUBCUTANEOUS at 12:00

## 2020-04-26 RX ADMIN — Medication 400 MILLIGRAM(S): at 12:24

## 2020-04-26 NOTE — PROGRESS NOTE ADULT - SUBJECTIVE AND OBJECTIVE BOX
CARDIOLOGY     PROGRESS  NOTE   ________________________________________________    CHIEF COMPLAINT:Patient is a 70y old  Male who presents with a chief complaint of sob (26 Apr 2020 11:09)  no complain, comfortable.  	  REVIEW OF SYSTEMS:  CONSTITUTIONAL: No fever, weight loss, or fatigue  EYES: No eye pain, visual disturbances, or discharge  ENT:  No difficulty hearing, tinnitus, vertigo; No sinus or throat pain  NECK: No pain or stiffness  RESPIRATORY: No cough, wheezing, chills or hemoptysis; no  Shortness of Breath  CARDIOVASCULAR: No chest pain, palpitations, passing out, dizziness, or leg swelling  GASTROINTESTINAL: No abdominal or epigastric pain. No nausea, vomiting, or hematemesis; No diarrhea or constipation. No melena or hematochezia.  GENITOURINARY: No dysuria, frequency, hematuria, or incontinence  NEUROLOGICAL: No headaches, memory loss, loss of strength, numbness, or tremors  SKIN: No itching, burning, rashes, or lesions   LYMPH Nodes: No enlarged glands  ENDOCRINE: No heat or cold intolerance; No hair loss  MUSCULOSKELETAL: No joint pain or swelling; No muscle, back, or extremity pain  PSYCHIATRIC: No depression, anxiety, mood swings, or difficulty sleeping  HEME/LYMPH: No easy bruising, or bleeding gums  ALLERGY AND IMMUNOLOGIC: No hives or eczema	    [ ] All others negative	  [x ] Unable to obtain    PHYSICAL EXAM:  T(C): 38.4 (04-26-20 @ 12:10), Max: 38.5 (04-25-20 @ 22:00)  HR: 77 (04-26-20 @ 12:10) (67 - 89)  BP: 121/67 (04-26-20 @ 12:10) (121/67 - 138/76)  RR: 28 (04-26-20 @ 12:10) (26 - 30)  SpO2: 95% (04-26-20 @ 12:10) (80% - 99%)  Wt(kg): --  I&O's Summary    25 Apr 2020 07:01  -  26 Apr 2020 07:00  --------------------------------------------------------  IN: 1600 mL / OUT: 1400 mL / NET: 200 mL    26 Apr 2020 07:01  -  26 Apr 2020 12:54  --------------------------------------------------------  IN: 150 mL / OUT: 250 mL / NET: -100 mL        Appearance: Normal	  HEENT:   Normal oral mucosa, PERRL, EOMI	  Lymphatic: No lymphadenopathy  Cardiovascular: Normal S1 S2, No JVD,+ murmurs, No edema  Respiratory: Lungs clear to auscultation	  Psychiatry: A & O x 3, Mood & affect appropriate  Gastrointestinal:  Soft, Non-tender, + BS	  Skin: + imprioving  rashes, No ecchymoses, No cyanosis	  Neurologic: Non-focal  Extremities: Normal range of motion, No clubbing, cyanosis or edema  Vascular: Peripheral pulses palpable 2+ bilaterally    MEDICATIONS  (STANDING):  betamethasone valerate 0.1% Cream 1 Application(s) Topical two times a day  enoxaparin Injectable 40 milliGRAM(s) SubCutaneous daily  melatonin 3 milliGRAM(s) Oral at bedtime  tamsulosin 0.4 milliGRAM(s) Oral at bedtime      TELEMETRY: 	    ECG:  	  RADIOLOGY:  OTHER: 	  	  LABS:	 	    CARDIAC MARKERS:                                12.4   6.92  )-----------( 421      ( 26 Apr 2020 07:25 )             38.1     04-26    141  |  104  |  20  ----------------------------<  100<H>  4.2   |  22  |  0.70    Ca    9.0      26 Apr 2020 07:20      proBNP:   Lipid Profile:   HgA1c:   TSH: Thyroid Stimulating Hormone, Serum: 0.86 uIU/mL (04-25 @ 09:41)    blood cx, NTD   U/A with no pyuria, urine cx negative   trend markers of inflammation  supportive care.   oxygen requirement trending down.   rash, not classic for shingles, VZV PCR negative, consider derm input if feasible.       Assessment and plan  ---------------------------  69 yo M hx dementia nonverbal at baseline, family denies other PMHx. Per pt's daughter: pt was short of breath, productive coughing for 1 week, lethargy. Has had "on and off" for few weeks, and has been having fever Tm 104. Has not been tested for coronavirus.  Per EMS, pt has been "sick" for 3 weeks in a house with 2 covid positive family members. Per EMS pt saturating 88% on RA improved with NRB to 96%. Also per EMS rash to trunk noted. Per EMS pt is at his baseline now, A&Ox0  while in er pt bradycardic to 50.  pt is comfortable  even covid test is negative , but pt ct/ x ray is c/w  covid  bradycardia ?etiology ?hyperkalemia, correct k level  no beta blocker  increase LFT ?sec to COVID  will call family to get a better history specially meds  dvt prophylaxis  Id recommendation/ rash noted  awaiting ecg, no ecg in the system will check chart  bradycardia improved.

## 2020-04-26 NOTE — PROGRESS NOTE ADULT - ASSESSMENT
71 yo M hx dementia nonverbal at baseline, brought in for short of breath, productive coughing for 1 week, lethargy.  New rash    Sepsis, fever, tachypnea, hypoxia likely due to COVID-19 infection leading to pneumonia.  + rash.   Though COVID PCR negative, given CT chest classic for COVID, will treat as COVID.       Plan:  blood cx, NTD   U/A with no pyuria, urine cx negative   trend markers of inflammation  supportive care.   oxygen requirement trending down.   rash, not classic for shingles, VZV PCR negative, consider derm input if feasible.

## 2020-04-26 NOTE — PROGRESS NOTE BEHAVIORAL HEALTH - NSBHFUPINTERVALHXFT_PSY_A_CORE
Attempted to interview pt using echo tablet on unit. Pt uncooperative with interview, lethargic, not answering questions. pt nonverbal at baseline as per staff. pt received haldol prn for agitation. as per nurse, pt calm unless touched, then becomes combative

## 2020-04-26 NOTE — PROGRESS NOTE BEHAVIORAL HEALTH - OTHER
unable to assess- defer to H&P patient nonverbal patient nonverbal, moaning unable to assess- patient nonverbal

## 2020-04-26 NOTE — PROGRESS NOTE ADULT - SUBJECTIVE AND OBJECTIVE BOX
febrile    REVIEW OF SYSTEMS:  GEN: no fever,    no chills  RESP: no SOB,   no cough  CVS: no chest pain,   no palpitations  GI: no abdominal pain,   no nausea,   no vomiting,   no constipation,   no diarrhea  : no dysuria,   no frequency  NEURO: no headache,   no dizziness  PSYCH: no depression,   not anxious  Derm : no rash    MEDICATIONS  (STANDING):  betamethasone valerate 0.1% Cream 1 Application(s) Topical two times a day  enoxaparin Injectable 40 milliGRAM(s) SubCutaneous daily  melatonin 3 milliGRAM(s) Oral at bedtime    MEDICATIONS  (PRN):  ALBUTerol    90 MICROgram(s) HFA Inhaler 2 Puff(s) Inhalation every 4 hours PRN Wheezing  guaiFENesin   Syrup  (Sugar-Free) 200 milliGRAM(s) Oral every 6 hours PRN Cough  haloperidol    Injectable 0.5 milliGRAM(s) IntraMuscular every 8 hours PRN Agitation  hydrOXYzine hydrochloride 25 milliGRAM(s) Oral daily PRN Itching      Vital Signs Last 24 Hrs  T(C): 36.7 (2020 08:51), Max: 38.5 (2020 22:00)  T(F): 98.1 (2020 08:51), Max: 101.3 (2020 22:00)  HR: 67 (2020 08:51) (67 - 89)  BP: 131/76 (2020 08:51) (123/71 - 138/76)  BP(mean): --  RR: 28 (2020 08:51) (18 - 30)  SpO2: 99% (2020 08:51) (80% - 99%)  CAPILLARY BLOOD GLUCOSE        I&O's Summary    2020 07:01  -  2020 07:00  --------------------------------------------------------  IN: 1600 mL / OUT: 1400 mL / NET: 200 mL        PHYSICAL EXAM:  HEAD:  Atraumatic, Normocephalic  NECK: Supple, No   JVD  CHEST/LUNG:   no     rales,     no,    rhonchi  HEART: Regular rate and rhythm;         murmur  ABDOMEN: Soft, Nontender, ;   EXTREMITIES:     no   edema  NEUROLOGY:   confused./   rash, persising    LABS:                        12.4   6.92  )-----------( 421      ( 2020 07:25 )             38.1         141  |  104  |  20  ----------------------------<  100<H>  4.2   |  22  |  0.70    Ca    9.0      2020 07:20            Urinalysis Basic - ( 2020 17:55 )    Color: Yellow / Appearance: Clear / S.044 / pH: x  Gluc: x / Ketone: Trace  / Bili: Negative / Urobili: 4 mg/dL   Blood: x / Protein: 30 mg/dL / Nitrite: Negative   Leuk Esterase: Negative / RBC: 3 /hpf / WBC 1 /HPF   Sq Epi: x / Non Sq Epi: 2 /hpf / Bacteria: Negative           @ 13:03  3.7  <20      Thyroid Stimulating Hormone, Serum: 0.86 uIU/mL ( @ 09:41)          Consultant(s) Notes Reviewed:      Care Discussed with Consultants/Other Providers:

## 2020-04-26 NOTE — PROGRESS NOTE BEHAVIORAL HEALTH - NSBHCHARTREVIEWVS_PSY_A_CORE FT
Vital Signs Last 24 Hrs  T(C): 36.7 (26 Apr 2020 08:51), Max: 38.5 (25 Apr 2020 22:00)  T(F): 98.1 (26 Apr 2020 08:51), Max: 101.3 (25 Apr 2020 22:00)  HR: 67 (26 Apr 2020 08:51) (67 - 89)  BP: 131/76 (26 Apr 2020 08:51) (123/71 - 138/76)  BP(mean): --  RR: 28 (26 Apr 2020 08:51) (18 - 30)  SpO2: 99% (26 Apr 2020 08:51) (80% - 99%)

## 2020-04-26 NOTE — PROGRESS NOTE ADULT - SUBJECTIVE AND OBJECTIVE BOX
70y old  Male who presents with a chief complaint of sob (25 Apr 2020 12:28)      Interval history:  Afebrile today, requiring straight cath, o diarrhea. + restraints, saturating well on NC      Allergies:   No Known Allergies      Antimicrobials:      REVIEW OF SYSTEMS:  Unable to obtain due to pt's underlying mental status.       Vital Signs Last 24 Hrs  T(C): 36.7 (04-26-20 @ 08:51), Max: 38.5 (04-25-20 @ 22:00)  T(F): 98.1 (04-26-20 @ 08:51), Max: 101.3 (04-25-20 @ 22:00)  HR: 67 (04-26-20 @ 08:51) (67 - 89)  BP: 131/76 (04-26-20 @ 08:51) (123/71 - 138/76)  BP(mean): --  RR: 28 (04-26-20 @ 08:51) (18 - 30)  SpO2: 99% (04-26-20 @ 08:51) (80% - 99%)      PHYSICAL EXAM:  Per internal medicine                           12.4   6.92  )-----------( 421      ( 26 Apr 2020 07:25 )             38.1   04-26    141  |  104  |  20  ----------------------------<  100<H>  4.2   |  22  |  0.70    Ca    9.0      26 Apr 2020 07:20        Culture - Urine (collected 24 Apr 2020 19:02)  Source: .Urine Clean Catch (Midstream)  Final Report (25 Apr 2020 14:11):    No growth    Culture - Blood (collected 24 Apr 2020 19:02)  Source: .Blood Blood-Venous  Preliminary Report (25 Apr 2020 20:01):    No growth to date.    Culture - Blood (collected 24 Apr 2020 19:02)  Source: .Blood Blood-Peripheral  Preliminary Report (25 Apr 2020 20:01):    No growth to date.      Herpes Simplex Virus 1/2 VZV Lesions, PCR (04.25.20 @ 09:14)    Herpes Simplex Virus 1/2  VZV PCR Source: lesion    Herpes Simplex Virus 1/2  VZV PCR Result: NotDetec: HSV 1/2  and VZV assay is a Real-Time PCR test for the qualitative  detection and differentiation of herpes simplex virus type 1 (HSV1), 2  (HSV2) and varicella-zoster virus (VZV) DNA in lesion samples. The result  should be interpreted with consideration of all clinical and laboratory  findings.

## 2020-04-26 NOTE — PROGRESS NOTE BEHAVIORAL HEALTH - SUMMARY
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  Of note, per primary team, patient agitated in the ER, trying to get up out of bed, was given Ativan x 1 dose for agitation.  Again patient was agitated restless in bed, continues to try to get up out of bed and was medicated with Haldol x 1 dose.  Psychiatry consulted to assist with management of agitation.

## 2020-04-26 NOTE — PROGRESS NOTE ADULT - ASSESSMENT
71 yo M     hx dementia nonverbal at baseline,  has trouble  ambulating  at baseline, needs help, per  family   . Per   daughter: pt   had   short of breath, productive coughing for 1 week,  with  lethargy.    s/p   fever Tm 104. Has not been tested for coronavirus.    Per EMS, pt has been "sick" for 3 weeks in a house with 2 covid positive family members/  wife and daughter.  . Per grandson, pt's daughter Sade Roca is HCP  has  not been  ambulating  for past  2  weeks, per grandson.      sob  /  fevers and    hypoxia, , from probable  Covid  cxr, with diffuse  b/l  opacities  from Covid   covid  test  in Er , was  negative   pt   lives  in a  house,  with 2 positive  covid  family members    per  family, upon discussing  goc,   they  stated, they  '  will  not  confirm  or decline,  at  this  time '  seen by  PCU dr Hubbard/ pt remains  full code , at  this  time     maculo papular rash, on  ant  abd  wall, HSV, is  negative    covid, known to  cause  rash  ct  noted., right  inguinal hernia  with bladder involvement,  per  urology,   no  intervention needed  as  hernia  is  c/c , and    stable, defer  elective  surgery , when needed  , if  at  all  Pylai barbour, noted,  pt with prior  h/o  dementia   and  agitation  on haldol prn , for   acute delirium/  agitation   still  febrile.  blood   c/s, negative, no  ab, per  ID  seen by PT,  recommended  home  intermittent  agitation.  restraints  for  safety      from: Xray Chest 1 View- PORTABLE-Urgent (04.24.20 @ 07:48) >  IMPRESSION: Diffuse bilateral patchy opacities likely representing atypical/viral pneumonia. COVID 19 is considered.  < end of copied text >      < from: CT Abdomen and Pelvis w/ IV Cont (04.24.20 @ 10:00) >  IMPRESSION:   Right inguinal hernia that involves a portion of the bladder fundus. See above.  No evidence of bowel related inflammation nor obstruction.  Moderate bibasilar parenchymal infiltrates compatible with atypical pneumonia including Covid 19.  The pertinent findings were discussed with Dr. Díaz at the conclusion of today's evaluation    < end of copied text >

## 2020-04-26 NOTE — PROGRESS NOTE BEHAVIORAL HEALTH - NSBHCONSULTRECOMMENDOTHER_PSY_A_CORE FT
1. Check: EKG, TSH, B12, folate, RPR, UA; consider CT head    2. Minimize use of benzos, opioids, anticholinergics, or other deliriogenic agents when possible.  Maintain sleep wake cycle.  Provide frequent reorientation and redirection.  Family member at bedside if possible. Assess for need for glasses and hearing aid (if applicable).    3. Pt does not meet criteria for psychiatric hospitalization.  Recommend outpt psych f/u at [facility] after d/c- [phone].   [facility] Psych will follow.

## 2020-04-26 NOTE — PROGRESS NOTE BEHAVIORAL HEALTH - NSBHCHARTREVIEWLAB_PSY_A_CORE FT
12.4   6.92  )-----------( 421      ( 26 Apr 2020 07:25 )             38.1     04-26    141  |  104  |  20  ----------------------------<  100<H>  4.2   |  22  |  0.70    Ca    9.0      26 Apr 2020 07:20

## 2020-04-27 LAB
ALBUMIN SERPL ELPH-MCNC: 3.1 G/DL — LOW (ref 3.3–5)
ALP SERPL-CCNC: 175 U/L — HIGH (ref 40–120)
ALT FLD-CCNC: 166 U/L — HIGH (ref 10–45)
ANION GAP SERPL CALC-SCNC: 12 MMOL/L — SIGNIFICANT CHANGE UP (ref 5–17)
AST SERPL-CCNC: 101 U/L — HIGH (ref 10–40)
BILIRUB SERPL-MCNC: 0.8 MG/DL — SIGNIFICANT CHANGE UP (ref 0.2–1.2)
BUN SERPL-MCNC: 18 MG/DL — SIGNIFICANT CHANGE UP (ref 7–23)
CALCIUM SERPL-MCNC: 9.3 MG/DL — SIGNIFICANT CHANGE UP (ref 8.4–10.5)
CHLORIDE SERPL-SCNC: 105 MMOL/L — SIGNIFICANT CHANGE UP (ref 96–108)
CO2 SERPL-SCNC: 24 MMOL/L — SIGNIFICANT CHANGE UP (ref 22–31)
CREAT SERPL-MCNC: 0.63 MG/DL — SIGNIFICANT CHANGE UP (ref 0.5–1.3)
CRP SERPL-MCNC: 19.93 MG/DL — HIGH (ref 0–0.4)
D DIMER BLD IA.RAPID-MCNC: 4890 NG/ML DDU — HIGH
FERRITIN SERPL-MCNC: 1997 NG/ML — HIGH (ref 30–400)
GLUCOSE SERPL-MCNC: 107 MG/DL — HIGH (ref 70–99)
HCT VFR BLD CALC: 39.5 % — SIGNIFICANT CHANGE UP (ref 39–50)
HGB BLD-MCNC: 12.8 G/DL — LOW (ref 13–17)
MCHC RBC-ENTMCNC: 30 PG — SIGNIFICANT CHANGE UP (ref 27–34)
MCHC RBC-ENTMCNC: 32.4 GM/DL — SIGNIFICANT CHANGE UP (ref 32–36)
MCV RBC AUTO: 92.5 FL — SIGNIFICANT CHANGE UP (ref 80–100)
NRBC # BLD: 0 /100 WBCS — SIGNIFICANT CHANGE UP (ref 0–0)
PLATELET # BLD AUTO: 502 K/UL — HIGH (ref 150–400)
POTASSIUM SERPL-MCNC: 4.1 MMOL/L — SIGNIFICANT CHANGE UP (ref 3.5–5.3)
POTASSIUM SERPL-SCNC: 4.1 MMOL/L — SIGNIFICANT CHANGE UP (ref 3.5–5.3)
PROT SERPL-MCNC: 7.6 G/DL — SIGNIFICANT CHANGE UP (ref 6–8.3)
RBC # BLD: 4.27 M/UL — SIGNIFICANT CHANGE UP (ref 4.2–5.8)
RBC # FLD: 14.2 % — SIGNIFICANT CHANGE UP (ref 10.3–14.5)
SODIUM SERPL-SCNC: 141 MMOL/L — SIGNIFICANT CHANGE UP (ref 135–145)
WBC # BLD: 7.21 K/UL — SIGNIFICANT CHANGE UP (ref 3.8–10.5)
WBC # FLD AUTO: 7.21 K/UL — SIGNIFICANT CHANGE UP (ref 3.8–10.5)

## 2020-04-27 PROCEDURE — 70450 CT HEAD/BRAIN W/O DYE: CPT | Mod: 26

## 2020-04-27 PROCEDURE — 99232 SBSQ HOSP IP/OBS MODERATE 35: CPT

## 2020-04-27 PROCEDURE — 99232 SBSQ HOSP IP/OBS MODERATE 35: CPT | Mod: CS

## 2020-04-27 PROCEDURE — 93010 ELECTROCARDIOGRAM REPORT: CPT

## 2020-04-27 RX ORDER — ACETAMINOPHEN 500 MG
650 TABLET ORAL EVERY 6 HOURS
Refills: 0 | Status: DISCONTINUED | OUTPATIENT
Start: 2020-04-27 | End: 2020-05-02

## 2020-04-27 RX ORDER — HALOPERIDOL DECANOATE 100 MG/ML
0.5 INJECTION INTRAMUSCULAR
Refills: 0 | Status: DISCONTINUED | OUTPATIENT
Start: 2020-04-27 | End: 2020-05-01

## 2020-04-27 RX ADMIN — HALOPERIDOL DECANOATE 0.5 MILLIGRAM(S): 100 INJECTION INTRAMUSCULAR at 16:07

## 2020-04-27 RX ADMIN — Medication 3 MILLIGRAM(S): at 21:55

## 2020-04-27 RX ADMIN — Medication 1 APPLICATION(S): at 16:07

## 2020-04-27 RX ADMIN — Medication 650 MILLIGRAM(S): at 23:05

## 2020-04-27 RX ADMIN — TAMSULOSIN HYDROCHLORIDE 0.4 MILLIGRAM(S): 0.4 CAPSULE ORAL at 21:55

## 2020-04-27 RX ADMIN — Medication 1 APPLICATION(S): at 06:00

## 2020-04-27 RX ADMIN — ENOXAPARIN SODIUM 40 MILLIGRAM(S): 100 INJECTION SUBCUTANEOUS at 11:19

## 2020-04-27 NOTE — PROGRESS NOTE ADULT - SUBJECTIVE AND OBJECTIVE BOX
was  febrile/  calm now    REVIEW OF SYSTEMS:  GEN: no fever,    no chills  RESP: no SOB,   no cough  CVS: no chest pain,   no palpitations  GI: no abdominal pain,   no nausea,   no vomiting,   no constipation,   no diarrhea  : no dysuria,   no frequency  NEURO: no headache,   no dizziness  PSYCH: no depression,   not anxious  Derm : no rash    MEDICATIONS  (STANDING):  betamethasone valerate 0.1% Cream 1 Application(s) Topical two times a day  enoxaparin Injectable 40 milliGRAM(s) SubCutaneous daily  melatonin 3 milliGRAM(s) Oral at bedtime  tamsulosin 0.4 milliGRAM(s) Oral at bedtime    MEDICATIONS  (PRN):  ALBUTerol    90 MICROgram(s) HFA Inhaler 2 Puff(s) Inhalation every 4 hours PRN Wheezing  guaiFENesin   Syrup  (Sugar-Free) 200 milliGRAM(s) Oral every 6 hours PRN Cough  haloperidol    Injectable 0.5 milliGRAM(s) IntraMuscular every 8 hours PRN Agitation  hydrOXYzine hydrochloride 25 milliGRAM(s) Oral daily PRN Itching      Vital Signs Last 24 Hrs  T(C): 37.5 (27 Apr 2020 10:40), Max: 38.7 (26 Apr 2020 21:38)  T(F): 99.5 (27 Apr 2020 10:40), Max: 101.7 (26 Apr 2020 21:38)  HR: 79 (27 Apr 2020 10:40) (68 - 84)  BP: 122/71 (27 Apr 2020 10:40) (115/71 - 146/87)  BP(mean): --  RR: 26 (27 Apr 2020 10:40) (24 - 29)  SpO2: 95% (27 Apr 2020 10:40) (94% - 97%)  CAPILLARY BLOOD GLUCOSE        I&O's Summary    26 Apr 2020 07:01  -  27 Apr 2020 07:00  --------------------------------------------------------  IN: 350 mL / OUT: 700 mL / NET: -350 mL    27 Apr 2020 07:01  -  27 Apr 2020 11:03  --------------------------------------------------------  IN: 0 mL / OUT: 250 mL / NET: -250 mL        PHYSICAL EXAM:  HEAD:  Atraumatic, Normocephalic  NECK: Supple, No   JVD  CHEST/LUNG:   no     rales,     no,    rhonchi  HEART: Regular rate and rhythm;         murmur  ABDOMEN: Soft, Nontender, ;   EXTREMITIES:  no      edema  NEUROLOGY:   resting    LABS:                        12.8   7.21  )-----------( 502      ( 27 Apr 2020 06:31 )             39.5     04-27    141  |  105  |  18  ----------------------------<  107<H>  4.1   |  24  |  0.63    Ca    9.3      27 Apr 2020 06:30    TPro  7.6  /  Alb  3.1<L>  /  TBili  0.8  /  DBili  x   /  AST  101<H>  /  ALT  166<H>  /  AlkPhos  175<H>  04-27                    Thyroid Stimulating Hormone, Serum: 0.86 uIU/mL (04-25 @ 09:41)          Consultant(s) Notes Reviewed:      Care Discussed with Consultants/Other Providers:

## 2020-04-27 NOTE — CHART NOTE - NSCHARTNOTEFT_GEN_A_CORE
Nutrition Initial Assessment    Nutrition Consult Received: Yes [   ]  No [ x  ]    Reason for Initial Nutrition Assessment: Patient seen for routine length of stay assessment     Source of Information: Unable to conduct a fact to face interview due to limited contact restrictions related to pt's medical condition and isolation precautions. Information obtained from a phone call with the daughter and from the EMR. Patient noted with dementia and nonverbal at baseline.     Admitting Diagnosis: 70y Male admitted for Hypoxia    PAST MEDICAL & SURGICAL HISTORY:  Dementia      Subjective Information: As per daughter, Patient is a great eater.  No issues with eating at home and reports that mechanical soft diet is appropriate.  Patient "likes everything".  Unsure of usual body weight but around 180 pounds.  Now noted at 171 pounds as he was "sick" for a few weeks PTA.  Supplements with Ensure Enlive at home and likes vanilla.  Family brought a few cans in over weekend.  Offered Health Shakes in addition and daughter receptive.  Patient is a total assist at meals related to dementia.   Supplements with Vit C, D and multivitamin at home.        GI Issues: No N/V.  Positive small BM yesterday    Current Nutrition Order:  PO Intake:   Good (%) [   ]    Fair (50-75%) [ x  ]    Poor (<50%) [   ]    Skin Integrity: Intact.  Noted with rash on trunk possibly due to covid 19.    Labs:   04-27 Na141 mmol/L Glu 107 mg/dL<H> K+ 4.1 mmol/L Cr  0.63 mg/dL BUN 18 mg/dL Phos n/a   Alb 3.1 g/dL<L> PAB n/a             Medications:  MEDICATIONS  (STANDING):  betamethasone valerate 0.1% Cream 1 Application(s) Topical two times a day  enoxaparin Injectable 40 milliGRAM(s) SubCutaneous daily  haloperidol     Tablet 0.5 milliGRAM(s) Oral two times a day  melatonin 3 milliGRAM(s) Oral at bedtime  tamsulosin 0.4 milliGRAM(s) Oral at bedtime    MEDICATIONS  (PRN):  haloperidol    Injectable 0.5 milliGRAM(s) IntraMuscular every 8 hours PRN Agitation  hydrOXYzine hydrochloride 25 milliGRAM(s) Oral daily PRN Itching    Admitted Anthropometrics:    Height (cm): 182.9 (04-24-20 @ 12:10)  Weight (kg): 77.5 (04-24-20 @ 12:10)  BMI (kg/m2): 23.2 (04-24-20 @ 12:10)    Nutrition Focused Physical Exam: Unable to complete due to limited isolation contact precautions at this time.     Estimated Energy Needs (1950-2340_ kcal/kg- 265-30_ kcal/kg):   Estimated Protein Needs (78-94 g/kg- 1.0-1.2___ g/kg):   Based on weight of:    [  x] Nutrition Diagnosis: Increased nutrient needs for optimal immune function in recovery related to Covid 19 as evidenced by possible weight loss 9 pounds PTA.    Goal: Patient to meet 80% of nutrient needs for optimal immune function with recovery from Covid 19 and for weight repletion.    Nutrition Interventions: Mechanical soft diet  Suggest Ensure Enlive 8 ounces 2  RDN to provide health shakes x3 daily  Monitor diet tolerance, po intake, GI tolerance, weight trends, labs, and skin integrity  Encourage nutrient dense snacks and fluids   Suggest Vit c, multivitamin   RDN remains available for follow up     RD to follow-up per protocol.    Carmen Strong MA,RD,CHES,CDN  Beeper [phone]

## 2020-04-27 NOTE — PROGRESS NOTE ADULT - ASSESSMENT
69 yo M hx dementia nonverbal at baseline, brought in for short of breath, productive coughing for 1 week, lethargy.  New rash    Sepsis, fever, tachypnea, hypoxia likely due to COVID-19 infection leading to pneumonia.  + rash.   Though COVID PCR negative, given CT chest classic for COVID, will treat as COVID.       Plan:  blood cx, NTD   U/A with no pyuria, urine cx negative   trend markers of inflammation  supportive care.   oxygen requirement minimal   rash, not classic for shingles, VZV PCR negative, consider derm input if feasible, improving

## 2020-04-27 NOTE — PROGRESS NOTE ADULT - SUBJECTIVE AND OBJECTIVE BOX
70y old  Male who presents with a chief complaint of sob (27 Apr 2020 11:03)      Interval history:  febrile overnight, on 1-2 L via NC, no diarrhea.        Allergies:   No Known Allergies    Antimicrobials:      REVIEW OF SYSTEMS:  Unable to obtain due to pt's underlying mental status.       Vital Signs Last 24 Hrs  T(C): 36.6 (04-27-20 @ 16:58), Max: 38.7 (04-26-20 @ 21:38)  T(F): 97.8 (04-27-20 @ 16:58), Max: 101.7 (04-26-20 @ 21:38)  HR: 83 (04-27-20 @ 16:58) (68 - 83)  BP: 127/69 (04-27-20 @ 16:58) (115/71 - 141/86)  BP(mean): --  RR: 25 (04-27-20 @ 16:58) (25 - 29)  SpO2: 95% (04-27-20 @ 16:58) (93% - 97%)      PHYSICAL EXAM:  Patient in no acute distress, on NC, eyes closed   Cardiovascular: S1S2 normal.  Lungs: + air entry B/L lung fields, poor inspiratory effort   Gastrointestinal: soft,  nondistended.  Extremities: no edema.  IV sites wrapped in gauze  rash scabbing   condom catheter                           12.8   7.21  )-----------( 502      ( 27 Apr 2020 06:31 )             39.5   04-27    141  |  105  |  18  ----------------------------<  107<H>  4.1   |  24  |  0.63    Ca    9.3      27 Apr 2020 06:30    TPro  7.6  /  Alb  3.1<L>  /  TBili  0.8  /  DBili  x   /  AST  101<H>  /  ALT  166<H>  /  AlkPhos  175<H>  04-27      LIVER FUNCTIONS - ( 27 Apr 2020 06:30 )  Alb: 3.1 g/dL / Pro: 7.6 g/dL / ALK PHOS: 175 U/L / ALT: 166 U/L / AST: 101 U/L / GGT: x               Culture - Urine (collected 24 Apr 2020 19:02)  Source: .Urine Clean Catch (Midstream)  Final Report (25 Apr 2020 14:11):    No growth    Culture - Blood (collected 24 Apr 2020 19:02)  Source: .Blood Blood-Venous  Preliminary Report (25 Apr 2020 20:01):    No growth to date.    Culture - Blood (collected 24 Apr 2020 19:02)  Source: .Blood Blood-Peripheral  Preliminary Report (25 Apr 2020 20:01):    No growth to date.      Radiology:  < from: CT Head No Cont (04.27.20 @ 16:34) >  IMPRESSION: Moderate to marked and atrophy.

## 2020-04-28 PROCEDURE — 99232 SBSQ HOSP IP/OBS MODERATE 35: CPT | Mod: CS

## 2020-04-28 RX ADMIN — Medication 1 APPLICATION(S): at 16:45

## 2020-04-28 RX ADMIN — TAMSULOSIN HYDROCHLORIDE 0.4 MILLIGRAM(S): 0.4 CAPSULE ORAL at 23:13

## 2020-04-28 RX ADMIN — Medication 3 MILLIGRAM(S): at 23:13

## 2020-04-28 RX ADMIN — Medication 1 APPLICATION(S): at 05:15

## 2020-04-28 RX ADMIN — ENOXAPARIN SODIUM 40 MILLIGRAM(S): 100 INJECTION SUBCUTANEOUS at 13:01

## 2020-04-28 RX ADMIN — HALOPERIDOL DECANOATE 0.5 MILLIGRAM(S): 100 INJECTION INTRAMUSCULAR at 16:46

## 2020-04-28 RX ADMIN — HALOPERIDOL DECANOATE 0.5 MILLIGRAM(S): 100 INJECTION INTRAMUSCULAR at 05:15

## 2020-04-28 NOTE — PROGRESS NOTE ADULT - SUBJECTIVE AND OBJECTIVE BOX
afebrile/  demntia  REVIEW OF SYSTEMS:  GEN: no fever,    no chills  RESP: no SOB,   no cough  CVS: no chest pain,   no palpitations  GI: no abdominal pain,   no nausea,   no vomiting,   no constipation,   no diarrhea  : no dysuria,   no frequency  NEURO: no headache,   no dizziness  PSYCH: no depression,   not anxious  Derm : no rash    MEDICATIONS  (STANDING):  betamethasone valerate 0.1% Cream 1 Application(s) Topical two times a day  enoxaparin Injectable 40 milliGRAM(s) SubCutaneous daily  haloperidol     Tablet 0.5 milliGRAM(s) Oral two times a day  melatonin 3 milliGRAM(s) Oral at bedtime  tamsulosin 0.4 milliGRAM(s) Oral at bedtime    MEDICATIONS  (PRN):  acetaminophen   Tablet .. 650 milliGRAM(s) Oral every 6 hours PRN Temp greater or equal to 38C (100.4F)  haloperidol    Injectable 0.5 milliGRAM(s) IntraMuscular every 8 hours PRN Agitation  hydrOXYzine hydrochloride 25 milliGRAM(s) Oral daily PRN Itching      Vital Signs Last 24 Hrs  T(C): 36.6 (28 Apr 2020 08:52), Max: 37.9 (27 Apr 2020 23:05)  T(F): 97.9 (28 Apr 2020 08:52), Max: 100.3 (27 Apr 2020 23:05)  HR: 81 (28 Apr 2020 08:52) (70 - 84)  BP: 133/71 (28 Apr 2020 08:52) (103/62 - 133/71)  BP(mean): --  RR: 20 (28 Apr 2020 08:52) (20 - 25)  SpO2: 94% (28 Apr 2020 08:52) (93% - 98%)  CAPILLARY BLOOD GLUCOSE        I&O's Summary    27 Apr 2020 07:01  -  28 Apr 2020 07:00  --------------------------------------------------------  IN: 380 mL / OUT: 1475 mL / NET: -1095 mL    28 Apr 2020 07:01  -  28 Apr 2020 11:22  --------------------------------------------------------  IN: 220 mL / OUT: 250 mL / NET: -30 mL        PHYSICAL EXAM:  HEAD:  Atraumatic, Normocephalic  NECK: Supple, No   JVD  CHEST/LUNG:   no     rales,     no,    rhonchi  HEART: Regular rate and rhythm;         murmur  ABDOMEN: Soft, Nontender, ;   EXTREMITIES:     no   edema  NEUROLOGY:  a  dementia    LABS:                        12.8   7.21  )-----------( 502      ( 27 Apr 2020 06:31 )             39.5     04-27    141  |  105  |  18  ----------------------------<  107<H>  4.1   |  24  |  0.63    Ca    9.3      27 Apr 2020 06:30    TPro  7.6  /  Alb  3.1<L>  /  TBili  0.8  /  DBili  x   /  AST  101<H>  /  ALT  166<H>  /  AlkPhos  175<H>  04-27                    Thyroid Stimulating Hormone, Serum: 0.86 uIU/mL (04-25 @ 09:41)          Consultant(s) Notes Reviewed:      Care Discussed with Consultants/Other Providers:

## 2020-04-28 NOTE — PROVIDER CONTACT NOTE (OTHER) - ACTION/TREATMENT ORDERED:
provider aware, CPOX via missimo deemed inaccurate reading due to remors, pulse ox probe reading on forehead more accurate. Will continue to monitor.

## 2020-04-28 NOTE — PROGRESS NOTE ADULT - SUBJECTIVE AND OBJECTIVE BOX
CARDIOLOGY     PROGRESS  NOTE   ________________________________________________    CHIEF COMPLAINT:Patient is a 70y old  Male who presents with a chief complaint of sob (27 Apr 2020 18:34)  no complain.  	  REVIEW OF SYSTEMS:  CONSTITUTIONAL: No fever, weight loss, or fatigue  EYES: No eye pain, visual disturbances, or discharge  ENT:  No difficulty hearing, tinnitus, vertigo; No sinus or throat pain  NECK: No pain or stiffness  RESPIRATORY: No cough, wheezing, chills or hemoptysis; No Shortness of Breath  CARDIOVASCULAR: No chest pain, palpitations, passing out, dizziness, or leg swelling  GASTROINTESTINAL: No abdominal or epigastric pain. No nausea, vomiting, or hematemesis; No diarrhea or constipation. No melena or hematochezia.  GENITOURINARY: No dysuria, frequency, hematuria, or incontinence  NEUROLOGICAL: No headaches, memory loss, loss of strength, numbness, or tremors  SKIN: No itching, burning, rashes, or lesions   LYMPH Nodes: No enlarged glands  ENDOCRINE: No heat or cold intolerance; No hair loss  MUSCULOSKELETAL: No joint pain or swelling; No muscle, back, or extremity pain  PSYCHIATRIC: No depression, anxiety, mood swings, or difficulty sleeping  HEME/LYMPH: No easy bruising, or bleeding gums  ALLERGY AND IMMUNOLOGIC: No hives or eczema	    [ ] All others negative	  [x ] Unable to obtain    PHYSICAL EXAM:  T(C): 36.6 (04-28-20 @ 08:52), Max: 37.9 (04-27-20 @ 23:05)  HR: 81 (04-28-20 @ 08:52) (70 - 84)  BP: 133/71 (04-28-20 @ 08:52) (103/62 - 133/71)  RR: 20 (04-28-20 @ 08:52) (20 - 26)  SpO2: 94% (04-28-20 @ 08:52) (93% - 98%)  Wt(kg): --  I&O's Summary    27 Apr 2020 07:01  -  28 Apr 2020 07:00  --------------------------------------------------------  IN: 380 mL / OUT: 1475 mL / NET: -1095 mL    28 Apr 2020 07:01  -  28 Apr 2020 10:13  --------------------------------------------------------  IN: 220 mL / OUT: 250 mL / NET: -30 mL        Appearance: Normal	  HEENT:   Normal oral mucosa, PERRL, EOMI	  Lymphatic: No lymphadenopathy  Cardiovascular: Normal S1 S2, No JVD,+ murmurs, No edema  Respiratory: Lungs clear to auscultation	  Psychiatry: A & O x 3, Mood & affect appropriate  Gastrointestinal:  Soft, Non-tender, + BS	  Skin:+ rashes, No ecchymoses, No cyanosis	  Neurologic: Non-focal  Extremities: Normal range of motion, No clubbing, cyanosis or edema  Vascular: Peripheral pulses palpable 2+ bilaterally    MEDICATIONS  (STANDING):  betamethasone valerate 0.1% Cream 1 Application(s) Topical two times a day  enoxaparin Injectable 40 milliGRAM(s) SubCutaneous daily  haloperidol     Tablet 0.5 milliGRAM(s) Oral two times a day  melatonin 3 milliGRAM(s) Oral at bedtime  tamsulosin 0.4 milliGRAM(s) Oral at bedtime      TELEMETRY: 	    ECG:  	  RADIOLOGY:  OTHER: 	  	  LABS:	 	    CARDIAC MARKERS:                                12.8   7.21  )-----------( 502      ( 27 Apr 2020 06:31 )             39.5     04-27    141  |  105  |  18  ----------------------------<  107<H>  4.1   |  24  |  0.63    Ca    9.3      27 Apr 2020 06:30    TPro  7.6  /  Alb  3.1<L>  /  TBili  0.8  /  DBili  x   /  AST  101<H>  /  ALT  166<H>  /  AlkPhos  175<H>  04-27    proBNP:   Lipid Profile:   HgA1c:   TSH: Thyroid Stimulating Hormone, Serum: 0.86 uIU/mL (04-25 @ 09:41)  blood cx, NTD   U/A with no pyuria, urine cx negative   trend markers of inflammation  supportive care.   oxygen requirement minimal   rash, not classic for shingles, VZV PCR negative, consider derm input if feasible, improving           Assessment and plan  ---------------------------  71 yo M hx dementia nonverbal at baseline, family denies other PMHx. Per pt's daughter: pt was short of breath, productive coughing for 1 week, lethargy. Has had "on and off" for few weeks, and has been having fever Tm 104. Has not been tested for coronavirus.  Per EMS, pt has been "sick" for 3 weeks in a house with 2 covid positive family members. Per EMS pt saturating 88% on RA improved with NRB to 96%. Also per EMS rash to trunk noted. Per EMS pt is at his baseline now, A&Ox0  while in er pt bradycardic to 50.  pt is comfortable  even covid test is negative , but pt ct/ x ray is c/w  covid  bradycardia ?etiology ?hyperkalemia, correct k level  no beta blocker  increase LFT ?sec to COVID  will call family to get a better history specially meds  dvt prophylaxis  Id recommendation/ rash noted  awaiting ecg, no ecg in the system will check chart  bradycardia improved.  o2 level is stable

## 2020-04-28 NOTE — PROGRESS NOTE ADULT - ASSESSMENT
71 yo M     hx dementia nonverbal at baseline,  has trouble  ambulating  at baseline, needs help, per  family   . Per   daughter: pt   had   short of breath, productive coughing for 1 week,  with  lethargy.    s/p   fever Tm 104. Has not been tested for coronavirus.    Per EMS, pt has been "sick" for 3 weeks in a house with 2 covid positive family members/  wife and daughter.  . Per grandson, pt's daughter Sade Roca is HCP  has  not been  ambulating  for past  2  weeks, per grandson.      sob  /  fevers and    hypoxia, , from probable  Covid  cxr, with diffuse  b/l  opacities  from Covid   covid  test  in [place] , was  negative   pt   lives  in a  house,  with 2 positive  covid  family members    per  family, upon discussing  goc,   they  stated, they  '  will  not  confirm  or decline,  at  this  time '  seen by  PCU dr Hubbard/ pt remains  full code , at  this  time     maculo papular rash, on  ant  abd  wall, HSV, is  negative    covid, known to  cause  rash  ct  noted., right  inguinal hernia  with bladder involvement,  per  urology,   no  intervention needed  as  hernia  is  c/c , and    stable, defer  elective  surgery , when needed  , if  at  all  Pylai barbour, noted,  pt with prior  h/o  dementia   and  agitation  on haldol prn , for   acute delirium/  agitation   still  febrile.  blood   c/s, negative, no  ab, per  ID  seen by PT,  recommended  home  intermittent  agitation.  restraints  for  safety    blood / urine    negative/  tsh, normal    ct  head,  marked  atrophy   elevated     inflammatory markers. from  probable  covid  pt  with  baseline  advanced  dementia/ on  haldol  does  not  need  daily  labs    from: Xray Chest 1 View- PORTABLE-Urgent (04.24.20 @ 07:48) >  IMPRESSION: Diffuse bilateral patchy opacities likely representing atypical/viral pneumonia. COVID 19 is considered.  < end of copied text >      < from: CT Abdomen and Pelvis w/ IV Cont (04.24.20 @ 10:00) >  IMPRESSION:   Right inguinal hernia that involves a portion of the bladder fundus. See above.  No evidence of bowel related inflammation nor obstruction.  Moderate bibasilar parenchymal infiltrates compatible with atypical pneumonia including Covid 19.  The pertinent findings were discussed with Dr. Díaz at the conclusion of today's evaluation    < end of copied text >

## 2020-04-28 NOTE — PROVIDER CONTACT NOTE (OTHER) - SITUATION
Pt AxOx0 at baseline, nonverbal, hx of dementia and tremors, on CPOX satting between 87-88% RA via cameron on R ring finger. O2 saturation reading on forehead 98% on room air.

## 2020-04-29 LAB
CULTURE RESULTS: SIGNIFICANT CHANGE UP
CULTURE RESULTS: SIGNIFICANT CHANGE UP
SPECIMEN SOURCE: SIGNIFICANT CHANGE UP
SPECIMEN SOURCE: SIGNIFICANT CHANGE UP

## 2020-04-29 PROCEDURE — 99232 SBSQ HOSP IP/OBS MODERATE 35: CPT | Mod: CS

## 2020-04-29 RX ADMIN — TAMSULOSIN HYDROCHLORIDE 0.4 MILLIGRAM(S): 0.4 CAPSULE ORAL at 20:04

## 2020-04-29 RX ADMIN — ENOXAPARIN SODIUM 40 MILLIGRAM(S): 100 INJECTION SUBCUTANEOUS at 12:03

## 2020-04-29 RX ADMIN — Medication 1 APPLICATION(S): at 17:18

## 2020-04-29 RX ADMIN — HALOPERIDOL DECANOATE 0.5 MILLIGRAM(S): 100 INJECTION INTRAMUSCULAR at 05:46

## 2020-04-29 RX ADMIN — Medication 3 MILLIGRAM(S): at 20:04

## 2020-04-29 RX ADMIN — HALOPERIDOL DECANOATE 0.5 MILLIGRAM(S): 100 INJECTION INTRAMUSCULAR at 17:18

## 2020-04-29 RX ADMIN — Medication 1 APPLICATION(S): at 05:45

## 2020-04-29 NOTE — PROGRESS NOTE ADULT - ASSESSMENT
69 yo M     hx dementia nonverbal at baseline,  has trouble  ambulating  at baseline, needs help, per  family   . Per   daughter: pt   had   short of breath, productive coughing for 1 week,  with  lethargy.    s/p   fever Tm 104. Has not been tested for coronavirus.    Per EMS, pt has been "sick" for 3 weeks in a house with 2 covid positive family members/  wife and daughter.  . Per grandson, pt's daughter Sade Roca is HCP  has  not been  ambulating  for past  2  weeks, per grandson.      sob  /  fevers and    hypoxia, , from probable  Covid  cxr, with diffuse  b/l  opacities  from Covid   covid  test  in Er , was  negative   pt   lives  in a  house,  with 2 positive  covid  family members    per  family, upon discussing  goc,   they  stated, they  '  will  not  confirm  or decline,  at  this  time '  seen by  PCU dr Hubbard/ pt remains  full code , at  this  time     maculo papular rash, on  ant  abd  wall, HSV, is  negative    covid, known to  cause  rash  ct  noted., right  inguinal hernia  with bladder involvement,  per  urology,   no  intervention needed  as  hernia  is  c/c , and    stable, defer  elective  surgery , when needed  , if  at  all  Caroline barbour, noted,  pt with prior  h/o  dementia   and  agitation  on haldol prn , for   acute delirium/  agitation   still  febrile.  blood   c/s, negative, no  ab, per  ID  seen by PT,  recommended  home  intermittent  agitation.  restraints  for  safety    blood / urine    negative/  tsh, normal    ct  head,  marked  atrophy   elevated     inflammatory markers. from  probable  covid  pt  with  baseline  advanced  dementia/ on  haldol  does  not  need  daily  labs    pt  with   urinary  retention again  this  am,  this  seems  to be  recurring    spoke  with leroy kenney/  will  have  urology   f/p,  regarding ?  gamino/  pt  on  flomax  pt  is not  DNR, per  family    from: Xray Chest 1 View- PORTABLE-Urgent (04.24.20 @ 07:48) >  IMPRESSION: Diffuse bilateral patchy opacities likely representing atypical/viral pneumonia. COVID 19 is considered.  < end of copied text >      < from: CT Abdomen and Pelvis w/ IV Cont (04.24.20 @ 10:00) >  IMPRESSION:   Right inguinal hernia that involves a portion of the bladder fundus. See above.  No evidence of bowel related inflammation nor obstruction.  Moderate bibasilar parenchymal infiltrates compatible with atypical pneumonia including Covid 19.  The pertinent findings were discussed with Dr. Díaz at the conclusion of today's evaluation    < end of copied text > 69 yo M     hx dementia nonverbal at baseline,  has trouble  ambulating  at baseline, needs help, per  family   . Per   daughter: pt   had   short of breath, productive coughing for 1 week,  with  lethargy.    s/p   fever Tm 104. Has not been tested for coronavirus.    Per EMS, pt has been "sick" for 3 weeks in a house with 2 covid positive family members/  wife and daughter.  . Per grandson, pt's daughter Sade Roca is HCP  has  not been  ambulating  for past  2  weeks, per grandson.      sob  /  fevers and    hypoxia, , from probable  Covid  cxr, with diffuse  b/l  opacities  from Covid   covid  test  in Er , was  negative   pt   lives  in a  house,  with 2 positive  covid  family members    per  family, upon discussing  goc,   they  stated, they  '  will  not  confirm  or decline,  at  this  time '  seen by  PCU dr Hubbard/ pt remains  full code , at  this  time     maculo papular rash, on  ant  abd  wall, HSV, is  negative    covid, known to  cause  rash  ct  noted., right  inguinal hernia  with bladder involvement,  per  urology,   no  intervention needed  as  hernia  is  c/c , and    stable, defer  elective  surgery , when needed  , if  at  all  Pylai barbour, noted,  pt with prior  h/o  dementia   and  agitation  on haldol prn , for   acute delirium/  agitation   still  febrile.  blood   c/s, negative, no  ab, per  ID  seen by PT,  recommended  home  intermittent  agitation.  restraints  for  safety    blood / urine    negative/  tsh, normal    ct  head,  marked  atrophy   elevated     inflammatory markers. from  probable  covid  pt  with  baseline  advanced  dementia/ on  haldol  does  not  need  daily  labs    pt  with   urinary  retention again  this  am, pvr  of  above  400 cc /     spoke  with leroy kenney/  will  have  urology   f/p,  regarding ?  gamino/  pt  on  flomax  pt  is not  DNR, per  family    from: Xray Chest 1 View- PORTABLE-Urgent (04.24.20 @ 07:48) >  IMPRESSION: Diffuse bilateral patchy opacities likely representing atypical/viral pneumonia. COVID 19 is considered.  < end of copied text >      < from: CT Abdomen and Pelvis w/ IV Cont (04.24.20 @ 10:00) >  IMPRESSION:   Right inguinal hernia that involves a portion of the bladder fundus. See above.  No evidence of bowel related inflammation nor obstruction.  Moderate bibasilar parenchymal infiltrates compatible with atypical pneumonia including Covid 19.  The pertinent findings were discussed with Dr. Díaz at the conclusion of today's evaluation    < end of copied text >

## 2020-04-29 NOTE — PROGRESS NOTE ADULT - SUBJECTIVE AND OBJECTIVE BOX
Patient nonverbal but according to care team was in no distress at time of straight catheterization. Cath was not post void, and patient has been voiding with generally good PVR occasionally somewhat elevated.    T(F): 98.5, Max: 98.9 (04-28-20 @ 17:15)  HR: 70  BP: 115/62  SpO2: 100%    OUT:    Voided: 875 mL  Total OUT: 875 mL      OUT:    Intermittent Catheterization - Urethral: 650 mL  Total OUT: 650 mL        .Blood Blood-Peripheral  04-24  No growth to date.

## 2020-04-29 NOTE — PROGRESS NOTE ADULT - SUBJECTIVE AND OBJECTIVE BOX
CARDIOLOGY     PROGRESS  NOTE   ________________________________________________    CHIEF COMPLAINT:Patient is a 70y old  Male who presents with a chief complaint of sob (28 Apr 2020 11:22)  no complain.  	  REVIEW OF SYSTEMS:  CONSTITUTIONAL: No fever, weight loss, or fatigue  EYES: No eye pain, visual disturbances, or discharge  ENT:  No difficulty hearing, tinnitus, vertigo; No sinus or throat pain  NECK: No pain or stiffness  RESPIRATORY: No cough, wheezing, chills or hemoptysis; No Shortness of Breath  CARDIOVASCULAR: No chest pain, palpitations, passing out, dizziness, or leg swelling  GASTROINTESTINAL: No abdominal or epigastric pain. No nausea, vomiting, or hematemesis; No diarrhea or constipation. No melena or hematochezia.  GENITOURINARY: No dysuria, frequency, hematuria, or incontinence  NEUROLOGICAL: No headaches, memory loss, loss of strength, numbness, or tremors  SKIN: No itching, burning, rashes, or lesions   LYMPH Nodes: No enlarged glands  ENDOCRINE: No heat or cold intolerance; No hair loss  MUSCULOSKELETAL: No joint pain or swelling; No muscle, back, or extremity pain  PSYCHIATRIC: No depression, anxiety, mood swings, or difficulty sleeping  HEME/LYMPH: No easy bruising, or bleeding gums  ALLERGY AND IMMUNOLOGIC: No hives or eczema	    [ ] All others negative	  [ x] Unable to obtain    PHYSICAL EXAM:  T(C): 36.6 (04-29-20 @ 08:33), Max: 37.2 (04-28-20 @ 17:15)  HR: 67 (04-29-20 @ 08:33) (67 - 81)  BP: 106/72 (04-29-20 @ 08:33) (101/63 - 125/82)  RR: 20 (04-29-20 @ 09:09) (20 - 21)  SpO2: 99% (04-29-20 @ 09:09) (87% - 99%)  Wt(kg): --  I&O's Summary    28 Apr 2020 07:01  -  29 Apr 2020 07:00  --------------------------------------------------------  IN: 485 mL / OUT: 875 mL / NET: -390 mL        Appearance: Normal	  HEENT:   Normal oral mucosa, PERRL, EOMI	  Lymphatic: No lymphadenopathy  Cardiovascular: Normal S1 S2, No JVD, + murmurs, No edema  Respiratory: Lungs clear to auscultation	  Psychiatry: A & O x 3, Mood & affect appropriate  Gastrointestinal:  Soft, Non-tender, + BS	  Skin: No rashes, No ecchymoses, No cyanosis	  Neurologic: Non-focal  Extremities: Normal range of motion, No clubbing, cyanosis or edema  Vascular: Peripheral pulses palpable 2+ bilaterally    MEDICATIONS  (STANDING):  betamethasone valerate 0.1% Cream 1 Application(s) Topical two times a day  enoxaparin Injectable 40 milliGRAM(s) SubCutaneous daily  haloperidol     Tablet 0.5 milliGRAM(s) Oral two times a day  melatonin 3 milliGRAM(s) Oral at bedtime  tamsulosin 0.4 milliGRAM(s) Oral at bedtime      TELEMETRY: 	    ECG:  	  RADIOLOGY:  OTHER: 	  	  LABS:	 	    CARDIAC MARKERS:                  proBNP:   Lipid Profile:   HgA1c:   TSH: Thyroid Stimulating Hormone, Serum: 0.86 uIU/mL (04-25 @ 09:41)          Assessment and plan  ---------------------------  69 yo M hx dementia nonverbal at baseline, family denies other PMHx. Per pt's daughter: pt was short of breath, productive coughing for 1 week, lethargy. Has had "on and off" for few weeks, and has been having fever Tm 104. Has not been tested for coronavirus.  Per EMS, pt has been "sick" for 3 weeks in a house with 2 covid positive family members. Per EMS pt saturating 88% on RA improved with NRB to 96%. Also per EMS rash to trunk noted. Per EMS pt is at his baseline now, A&Ox0  while in er pt bradycardic to 50.  pt is comfortable  even covid test is negative , but pt ct/ x ray is c/w  covid  bradycardia ?etiology ?hyperkalemia, correct k level  no beta blocker  increase LFT ?sec to COVID  will call family to get a better history specially meds  dvt prophylaxis  Id recommendation/ rash noted  awaiting ecg, no ecg in the system will check chart  bradycardia improved.  o2 level is stable

## 2020-04-29 NOTE — PROGRESS NOTE ADULT - SUBJECTIVE AND OBJECTIVE BOX
afebrile  REVIEW OF SYSTEMS:  GEN: no fever,    no chills  RESP: no SOB,   no cough  CVS: no chest pain,   no palpitations  GI: no abdominal pain,   no nausea,   no vomiting,   no constipation,   no diarrhea  : no dysuria,   no frequency  NEURO: no headache,   no dizziness  PSYCH: no depression,   not anxious  Derm : no rash    MEDICATIONS  (STANDING):  betamethasone valerate 0.1% Cream 1 Application(s) Topical two times a day  enoxaparin Injectable 40 milliGRAM(s) SubCutaneous daily  haloperidol     Tablet 0.5 milliGRAM(s) Oral two times a day  melatonin 3 milliGRAM(s) Oral at bedtime  tamsulosin 0.4 milliGRAM(s) Oral at bedtime    MEDICATIONS  (PRN):  acetaminophen   Tablet .. 650 milliGRAM(s) Oral every 6 hours PRN Temp greater or equal to 38C (100.4F)  haloperidol    Injectable 0.5 milliGRAM(s) IntraMuscular every 8 hours PRN Agitation  hydrOXYzine hydrochloride 25 milliGRAM(s) Oral daily PRN Itching      Vital Signs Last 24 Hrs  T(C): 36.6 (29 Apr 2020 08:33), Max: 37.2 (28 Apr 2020 17:15)  T(F): 97.8 (29 Apr 2020 08:33), Max: 98.9 (28 Apr 2020 17:15)  HR: 67 (29 Apr 2020 08:33) (67 - 81)  BP: 106/72 (29 Apr 2020 08:33) (101/63 - 125/82)  BP(mean): --  RR: 20 (29 Apr 2020 09:09) (20 - 21)  SpO2: 99% (29 Apr 2020 09:09) (87% - 99%)  CAPILLARY BLOOD GLUCOSE        I&O's Summary    28 Apr 2020 07:01  -  29 Apr 2020 07:00  --------------------------------------------------------  IN: 485 mL / OUT: 875 mL / NET: -390 mL    29 Apr 2020 07:01  -  29 Apr 2020 12:07  --------------------------------------------------------  IN: 0 mL / OUT: 650 mL / NET: -650 mL        PHYSICAL EXAM:  HEAD:  Atraumatic, Normocephalic  NECK: Supple, No   JVD  CHEST/LUNG:   no     rales,     no,    rhonchi  HEART: Regular rate and rhythm;         murmur  ABDOMEN: Soft, Nontender, ;   EXTREMITIES:    no    edema  NEUROLOGY:    dementia.  non verbal    LABS:                          Thyroid Stimulating Hormone, Serum: 0.86 uIU/mL (04-25 @ 09:41)          Consultant(s) Notes Reviewed:      Care Discussed with Consultants/Other Providers:

## 2020-04-29 NOTE — PROGRESS NOTE ADULT - ASSESSMENT
70 year old male AAOx0, dementia presents with fevers at home and SOB and cough for 1 week. Incidentally found to have a partial bladder involvement in the right inguinal hernia.     No urologic intervention is needed or necessary for this.  Surgical correction would only involve a right inguinal hernia repair.      -- No need for serial bladder scans, no need for intermittent catheterization  -- No hydronephrosis or HANNAH and elevated PVR without distress or recurrent infections does not require intervention  -- If desired by family or found to be necessary, recommend a general surgery consult for the inguinal hernia  -- Will sign off, please re-consult as necessary  -- d/w Dr. Navarro

## 2020-04-29 NOTE — PROGRESS NOTE ADULT - ATTENDING COMMENTS
Edwin Schulz  Pager: [phone]. If no response or past 5 pm call [phone].     Will sign off, please call with questions.
Edwin Schulz  Pager: [phone]. If no response or past 5 pm call [phone].     Will sign off, please call with questions.
Agree with assessment and plan.

## 2020-04-30 LAB
ALBUMIN SERPL ELPH-MCNC: 2.9 G/DL — LOW (ref 3.3–5)
ALP SERPL-CCNC: 193 U/L — HIGH (ref 40–120)
ALT FLD-CCNC: 166 U/L — HIGH (ref 10–45)
ANION GAP SERPL CALC-SCNC: 13 MMOL/L — SIGNIFICANT CHANGE UP (ref 5–17)
AST SERPL-CCNC: 112 U/L — HIGH (ref 10–40)
BASOPHILS # BLD AUTO: 0.08 K/UL — SIGNIFICANT CHANGE UP (ref 0–0.2)
BASOPHILS NFR BLD AUTO: 1 % — SIGNIFICANT CHANGE UP (ref 0–2)
BILIRUB SERPL-MCNC: 0.6 MG/DL — SIGNIFICANT CHANGE UP (ref 0.2–1.2)
BUN SERPL-MCNC: 18 MG/DL — SIGNIFICANT CHANGE UP (ref 7–23)
CALCIUM SERPL-MCNC: 8.7 MG/DL — SIGNIFICANT CHANGE UP (ref 8.4–10.5)
CHLORIDE SERPL-SCNC: 103 MMOL/L — SIGNIFICANT CHANGE UP (ref 96–108)
CO2 SERPL-SCNC: 22 MMOL/L — SIGNIFICANT CHANGE UP (ref 22–31)
CREAT SERPL-MCNC: 0.59 MG/DL — SIGNIFICANT CHANGE UP (ref 0.5–1.3)
CRP SERPL-MCNC: 7.84 MG/DL — HIGH (ref 0–0.4)
D DIMER BLD IA.RAPID-MCNC: 2973 NG/ML DDU — HIGH
EOSINOPHIL # BLD AUTO: 0.13 K/UL — SIGNIFICANT CHANGE UP (ref 0–0.5)
EOSINOPHIL NFR BLD AUTO: 1.6 % — SIGNIFICANT CHANGE UP (ref 0–6)
FERRITIN SERPL-MCNC: 1215 NG/ML — HIGH (ref 30–400)
GLUCOSE SERPL-MCNC: 112 MG/DL — HIGH (ref 70–99)
HCT VFR BLD CALC: 38 % — LOW (ref 39–50)
HGB BLD-MCNC: 12.3 G/DL — LOW (ref 13–17)
IMM GRANULOCYTES NFR BLD AUTO: 3.9 % — HIGH (ref 0–1.5)
LDH SERPL L TO P-CCNC: 459 U/L — HIGH (ref 50–242)
LYMPHOCYTES # BLD AUTO: 1.16 K/UL — SIGNIFICANT CHANGE UP (ref 1–3.3)
LYMPHOCYTES # BLD AUTO: 14.2 % — SIGNIFICANT CHANGE UP (ref 13–44)
MCHC RBC-ENTMCNC: 30.1 PG — SIGNIFICANT CHANGE UP (ref 27–34)
MCHC RBC-ENTMCNC: 32.4 GM/DL — SIGNIFICANT CHANGE UP (ref 32–36)
MCV RBC AUTO: 92.9 FL — SIGNIFICANT CHANGE UP (ref 80–100)
MONOCYTES # BLD AUTO: 0.49 K/UL — SIGNIFICANT CHANGE UP (ref 0–0.9)
MONOCYTES NFR BLD AUTO: 6 % — SIGNIFICANT CHANGE UP (ref 2–14)
NEUTROPHILS # BLD AUTO: 6 K/UL — SIGNIFICANT CHANGE UP (ref 1.8–7.4)
NEUTROPHILS NFR BLD AUTO: 73.3 % — SIGNIFICANT CHANGE UP (ref 43–77)
NRBC # BLD: 0 /100 WBCS — SIGNIFICANT CHANGE UP (ref 0–0)
PLATELET # BLD AUTO: 512 K/UL — HIGH (ref 150–400)
POTASSIUM SERPL-MCNC: 4.3 MMOL/L — SIGNIFICANT CHANGE UP (ref 3.5–5.3)
POTASSIUM SERPL-SCNC: 4.3 MMOL/L — SIGNIFICANT CHANGE UP (ref 3.5–5.3)
PROCALCITONIN SERPL-MCNC: 0.08 NG/ML — SIGNIFICANT CHANGE UP (ref 0.02–0.1)
PROT SERPL-MCNC: 7 G/DL — SIGNIFICANT CHANGE UP (ref 6–8.3)
RBC # BLD: 4.09 M/UL — LOW (ref 4.2–5.8)
RBC # FLD: 13.9 % — SIGNIFICANT CHANGE UP (ref 10.3–14.5)
SODIUM SERPL-SCNC: 138 MMOL/L — SIGNIFICANT CHANGE UP (ref 135–145)
WBC # BLD: 8.18 K/UL — SIGNIFICANT CHANGE UP (ref 3.8–10.5)
WBC # FLD AUTO: 8.18 K/UL — SIGNIFICANT CHANGE UP (ref 3.8–10.5)

## 2020-04-30 PROCEDURE — 99232 SBSQ HOSP IP/OBS MODERATE 35: CPT | Mod: CS

## 2020-04-30 RX ADMIN — Medication 1 APPLICATION(S): at 05:30

## 2020-04-30 RX ADMIN — HALOPERIDOL DECANOATE 0.5 MILLIGRAM(S): 100 INJECTION INTRAMUSCULAR at 17:13

## 2020-04-30 RX ADMIN — ENOXAPARIN SODIUM 40 MILLIGRAM(S): 100 INJECTION SUBCUTANEOUS at 11:16

## 2020-04-30 RX ADMIN — Medication 3 MILLIGRAM(S): at 21:21

## 2020-04-30 RX ADMIN — Medication 1 APPLICATION(S): at 17:13

## 2020-04-30 RX ADMIN — HALOPERIDOL DECANOATE 0.5 MILLIGRAM(S): 100 INJECTION INTRAMUSCULAR at 05:30

## 2020-04-30 RX ADMIN — TAMSULOSIN HYDROCHLORIDE 0.4 MILLIGRAM(S): 0.4 CAPSULE ORAL at 21:21

## 2020-04-30 NOTE — DISCHARGE NOTE PROVIDER - NSDCMRMEDTOKEN_GEN_ALL_CORE_FT
betamethasone valerate 0.1% topical cream: 1 application topically 2 times a day  tamsulosin 0.4 mg oral capsule: 1 cap(s) orally once a day (at bedtime) for BPH

## 2020-04-30 NOTE — CHART NOTE - NSCHARTNOTEFT_GEN_A_CORE
Mr. Pina is a 70 year old  male with hx of dementia a/w COVID-19 PNA. Patient evaluated by physical therapy during hospital stay. Based on patient's ongoing issues with deconditioning and with generalized weakness secondary to patient's diagnosis of COVID-19 PNA. Patient is discharge planning for home. Patient will need the following equipment for assistance at home due to weakness: standard walker and semi electric hospital bed. Patient will also require gel overlay to prevent pressure ulcers    Patient requires hoyerlift which is a medically necessary as patient has generalized weakness and requires maximum assistance of two persons for transfers, has decreased weight shifting ability, abnormal muscle tone and decreased balanced  which places patient at risk for falls.    Due to condition COVID-19 PNA, patient has a severe mobility limitation and requires a transport wheelchair to assist in daily ADLS. Patient cannot ambulate safely with a cane or a walker. Patient does not have sufficient upper body strength to self propel a standard wheelchair. Patient has a caregiver to assist with maneuvering the wheelchair. Patient has not expressed an unwillingness to use the wheelchair. Patient has ample room in the home for said wheelchair. Use of a transport wheelchair will significantly improve patient's ability to participate in daily ADL's and the patient will use it on a regular basis in the home.

## 2020-04-30 NOTE — PROGRESS NOTE ADULT - ASSESSMENT
69 yo M     hx dementia nonverbal at baseline,  has trouble  ambulating  at baseline, needs help, per  family   . Per   daughter: pt   had   short of breath, productive coughing for 1 week,  with  lethargy.    s/p   fever Tm 104. Has not been tested for coronavirus.    Per EMS, pt has been "sick" for 3 weeks in a house with 2 covid positive family members/  wife and daughter.  . Per grandson, pt's daughter Sade Roca is HCP  has  not been  ambulating  for past  2  weeks, per grandson.      sob  /  fevers and    hypoxia, , from probable  Covid, on arrival  cxr, with diffuse  b/l  opacities  from Covid   covid  test  in Er , was  negative   pt   lives  in a  house,  with 2 positive  covid  family members    per  family, upon discussing  goc,   they  stated, they  '  will  not  confirm  or decline,  at  this  time '  seen by  PCU dr Hubbard/ pt remains  full code , at  this  time     maculo papular rash, on  ant  abd  wall, HSV, is  negative    covid, known to  cause  rash  ct  noted., right  inguinal hernia  with bladder involvement,  per  urology,   no  intervention needed  as  hernia  is  c/c , and    stable, defer  elective  surgery , when needed  , if  at  all  Caroline barbour, noted,  pt with prior  h/o  dementia   and  agitation  on haldol prn , for   acute delirium/  agitation   still  febrile.  blood   c/s, negative, no  ab, per  ID  seen by PT,  recommended  home  intermittent  agitation.  restraints  for  safety by  floor    blood / urine    negative/  tsh, normal    ct  head,  marked  atrophy   elevated     inflammatory markers. from  probable  covid  pt  with  baseline  advanced  dementia/ on  haldol  does  not  need  daily  labs    pt  with   urinary  retention on 4/29, pvr  of  above  400 cc /  voiding  well, since  urology  f/p  noted    supportive care  per  ID  pt  is not  DNR, per  family    from: Xray Chest 1 View- PORTABLE-Urgent (04.24.20 @ 07:48) >  IMPRESSION: Diffuse bilateral patchy opacities likely representing atypical/viral pneumonia. COVID 19 is considered.  < end of copied text >      < from: CT Abdomen and Pelvis w/ IV Cont (04.24.20 @ 10:00) >  IMPRESSION:   Right inguinal hernia that involves a portion of the bladder fundus. See above.  No evidence of bowel related inflammation nor obstruction.  Moderate bibasilar parenchymal infiltrates compatible with atypical pneumonia including Covid 19.  The pertinent findings were discussed with Dr. Díaz at the conclusion of today's evaluation    < end of copied text > 69 yo M     hx dementia nonverbal at baseline,  has trouble  ambulating  at baseline, needs help, per  family   . Per   daughter: pt   had   short of breath, productive coughing for 1 week,  with  lethargy.    s/p   fever Tm 104. Has not been tested for coronavirus.    Per EMS, pt has been "sick" for 3 weeks in a house with 2 covid positive family members/  wife and daughter.  . Per grandson, pt's daughter Sade Roca is HCP  has  not been  ambulating  for past  2  weeks, per grandson.      sob  /  fevers and    hypoxia, , from probable  Covid, on arrival  cxr, with diffuse  b/l  opacities  from Covid   covid  test  in Er , was  negative   pt   lives  in a  house,  with 2 positive  covid  family members    per  family, upon discussing  goc,   they  stated, they  '  will  not  confirm  or decline,  at  this  time '  seen by  PCU dr Hubbard/ pt remains  full code , at  this  time     maculo papular rash, on  ant  abd  wall, HSV, is  negative    covid, known to  cause  rash  ct  noted., right  inguinal hernia  with bladder involvement,  per  urology,   no  intervention needed  as  hernia  is  c/c , and    stable, defer  elective  surgery , when needed  , if  at  all  Caroline barbour, noted,  pt with prior  h/o  dementia   and  agitation  on haldol prn , for   acute delirium/  agitation   still  febrile.  blood   c/s, negative, no  ab, per  ID  seen by PT,  recommended  home  intermittent  agitation.  restraints  for  safety by  floor    blood / urine    negative/  tsh, normal    ct  head,  marked  atrophy   elevated     inflammatory markers. from  probable  covid  pt  with  baseline  advanced  dementia/ on  haldol  does  not  need  daily  labs    pt  with   urinary  retention on 4/29, pvr  of  above  400 cc /  voiding  well, since  urology  f/p  noted    supportive care  per  ID  pt  is not  DNR, per  family  spoke  with  daughter/  start   d/c  planning  plan,  is   d/c  to home  in am  pts  dr  is dr ha ambrocio    from: Xray Chest 1 View- PORTABLE-Urgent (04.24.20 @ 07:48) >  IMPRESSION: Diffuse bilateral patchy opacities likely representing atypical/viral pneumonia. COVID 19 is considered.  < end of copied text >      < from: CT Abdomen and Pelvis w/ IV Cont (04.24.20 @ 10:00) >  IMPRESSION:   Right inguinal hernia that involves a portion of the bladder fundus. See above.  No evidence of bowel related inflammation nor obstruction.  Moderate bibasilar parenchymal infiltrates compatible with atypical pneumonia including Covid 19.  The pertinent findings were discussed with Dr. Díaz at the conclusion of today's evaluation    < end of copied text >

## 2020-04-30 NOTE — DISCHARGE NOTE PROVIDER - NSDCCPCAREPLAN_GEN_ALL_CORE_FT
PRINCIPAL DISCHARGE DIAGNOSIS  Diagnosis: Suspected 2019 novel coronavirus infection  Assessment and Plan of Treatment:       SECONDARY DISCHARGE DIAGNOSES  Diagnosis: Palliative care encounter  Assessment and Plan of Treatment: Palliative care encounter    Diagnosis: Delirium due to another medical condition  Assessment and Plan of Treatment:     Diagnosis: Debility  Assessment and Plan of Treatment: Debility    Diagnosis: Respiratory failure with hypoxia and hypercapnia, unspecified chronicity  Assessment and Plan of Treatment: Respiratory failure with hypoxia and hypercapnia, unspecified chronicity    Diagnosis: Suspected 2019 novel coronavirus infection  Assessment and Plan of Treatment: PRINCIPAL DISCHARGE DIAGNOSIS  Diagnosis: Suspected 2019 novel coronavirus infection  Assessment and Plan of Treatment: You were hospitalized for the COVID-19 virus.  Maintain a 2 week quarantine period at home before following up with your primary care doctor.  Take tylenol for fevers and do not take NSAIDs such as ibuprofen, motrin, naprosyn, advil, or aleve.  Return to the emergency room if you develop shortness of breath, chest pain, or high fevers.  If you do not have a primary care doctor, call [phone] in 48 hours after discharge to update a doctor on your status.      SECONDARY DISCHARGE DIAGNOSES  Diagnosis: Debility  Assessment and Plan of Treatment: Resume dependent care    Diagnosis: Delirium due to another medical condition  Assessment and Plan of Treatment: PRINCIPAL DISCHARGE DIAGNOSIS  Diagnosis: Suspected 2019 novel coronavirus infection  Assessment and Plan of Treatment: You were hospitalized for the COVID-19 virus.  Maintain a 2 week quarantine period at home before following up with your primary care doctor.  Take tylenol for fevers and do not take NSAIDs such as ibuprofen, motrin, naprosyn, advil, or aleve.  Return to the emergency room if you develop shortness of breath, chest pain, or high fevers.  If you do not have a primary care doctor, call [phone] in 48 hours after discharge to update a doctor on your status.      SECONDARY DISCHARGE DIAGNOSES  Diagnosis: Debility  Assessment and Plan of Treatment: Resume dependent care    Diagnosis: Delirium due to another medical condition  Assessment and Plan of Treatment: Resume dependent care

## 2020-04-30 NOTE — PROGRESS NOTE ADULT - SUBJECTIVE AND OBJECTIVE BOX
dementia/  calm  REVIEW OF SYSTEMS:  GEN: no fever,    no chills  RESP: no SOB,   no cough  CVS: no chest pain,   no palpitations  GI: no abdominal pain,   no nausea,   no vomiting,   no constipation,   no diarrhea  : no dysuria,   no frequency  NEURO: no headache,   no dizziness  PSYCH: no depression,   not anxious  Derm : no rash    MEDICATIONS  (STANDING):  betamethasone valerate 0.1% Cream 1 Application(s) Topical two times a day  enoxaparin Injectable 40 milliGRAM(s) SubCutaneous daily  haloperidol     Tablet 0.5 milliGRAM(s) Oral two times a day  melatonin 3 milliGRAM(s) Oral at bedtime  tamsulosin 0.4 milliGRAM(s) Oral at bedtime    MEDICATIONS  (PRN):  acetaminophen   Tablet .. 650 milliGRAM(s) Oral every 6 hours PRN Temp greater or equal to 38C (100.4F)      Vital Signs Last 24 Hrs  T(C): 36.6 (30 Apr 2020 08:25), Max: 36.9 (29 Apr 2020 14:18)  T(F): 97.9 (30 Apr 2020 08:25), Max: 98.5 (29 Apr 2020 14:18)  HR: 79 (30 Apr 2020 08:25) (70 - 87)  BP: 107/82 (30 Apr 2020 08:25) (97/58 - 115/62)  BP(mean): --  RR: 22 (30 Apr 2020 08:25) (20 - 24)  SpO2: 92% (30 Apr 2020 08:25) (87% - 100%)  CAPILLARY BLOOD GLUCOSE        I&O's Summary    29 Apr 2020 07:01  -  30 Apr 2020 07:00  --------------------------------------------------------  IN: 360 mL / OUT: 2025 mL / NET: -1665 mL        PHYSICAL EXAM:  HEAD:  Atraumatic, Normocephalic  NECK: Supple, No   JVD  CHEST/LUNG:   no     rales,     no,    rhonchi  HEART: Regular rate and rhythm;         murmur  ABDOMEN: Soft, Nontender, ;   EXTREMITIES:   no     edema  NEUROLOGY:  alert    LABS:                        12.3   8.18  )-----------( 512      ( 30 Apr 2020 07:17 )             38.0     04-30    138  |  103  |  18  ----------------------------<  112<H>  4.3   |  22  |  0.59    Ca    8.7      30 Apr 2020 07:17    TPro  7.0  /  Alb  2.9<L>  /  TBili  0.6  /  DBili  x   /  AST  112<H>  /  ALT  166<H>  /  AlkPhos  193<H>  04-30                    Thyroid Stimulating Hormone, Serum: 0.86 uIU/mL (04-25 @ 09:41)          Consultant(s) Notes Reviewed:      Care Discussed with Consultants/Other Providers:

## 2020-04-30 NOTE — PROGRESS NOTE ADULT - SUBJECTIVE AND OBJECTIVE BOX
CARDIOLOGY     PROGRESS  NOTE   ________________________________________________    CHIEF COMPLAINT:Patient is a 70y old  Male who presents with a chief complaint of sob (30 Apr 2020 09:58)  no complain.  	  REVIEW OF SYSTEMS:  CONSTITUTIONAL: No fever, weight loss, or fatigue  EYES: No eye pain, visual disturbances, or discharge  ENT:  No difficulty hearing, tinnitus, vertigo; No sinus or throat pain  NECK: No pain or stiffness  RESPIRATORY: No cough, wheezing, chills or hemoptysis; No Shortness of Breath  CARDIOVASCULAR: No chest pain, palpitations, passing out, dizziness, or leg swelling  GASTROINTESTINAL: No abdominal or epigastric pain. No nausea, vomiting, or hematemesis; No diarrhea or constipation. No melena or hematochezia.  GENITOURINARY: No dysuria, frequency, hematuria, or incontinence  NEUROLOGICAL: No headaches, memory loss, loss of strength, numbness, or tremors  SKIN: No itching, burning, rashes, or lesions   LYMPH Nodes: No enlarged glands  ENDOCRINE: No heat or cold intolerance; No hair loss  MUSCULOSKELETAL: No joint pain or swelling; No muscle, back, or extremity pain  PSYCHIATRIC: No depression, anxiety, mood swings, or difficulty sleeping  HEME/LYMPH: No easy bruising, or bleeding gums  ALLERGY AND IMMUNOLOGIC: No hives or eczema	    [ ] All others negative	  [ x] Unable to obtain    PHYSICAL EXAM:  T(C): 36.6 (04-30-20 @ 08:25), Max: 36.9 (04-29-20 @ 14:18)  HR: 79 (04-30-20 @ 08:25) (70 - 87)  BP: 107/82 (04-30-20 @ 08:25) (97/58 - 115/62)  RR: 22 (04-30-20 @ 08:25) (20 - 24)  SpO2: 92% (04-30-20 @ 08:25) (87% - 100%)  Wt(kg): --  I&O's Summary    29 Apr 2020 07:01  -  30 Apr 2020 07:00  --------------------------------------------------------  IN: 360 mL / OUT: 2025 mL / NET: -1665 mL        Appearance: Normal	  HEENT:   Normal oral mucosa, PERRL, EOMI	  Lymphatic: No lymphadenopathy  Cardiovascular: Normal S1 S2, No JVD,+ murmurs, No edema  Respiratory: Lungs clear to auscultation	  Psychiatry: A & O x 3, Mood & affect appropriate  Gastrointestinal:  Soft, Non-tender, + BS	  Skin: No rashes, No ecchymoses, No cyanosis	  Neurologic: Non-focal  Extremities: Normal range of motion, No clubbing, cyanosis or edema  Vascular: Peripheral pulses palpable 2+ bilaterally    MEDICATIONS  (STANDING):  betamethasone valerate 0.1% Cream 1 Application(s) Topical two times a day  enoxaparin Injectable 40 milliGRAM(s) SubCutaneous daily  haloperidol     Tablet 0.5 milliGRAM(s) Oral two times a day  melatonin 3 milliGRAM(s) Oral at bedtime  tamsulosin 0.4 milliGRAM(s) Oral at bedtime      TELEMETRY: 	    ECG:  	  RADIOLOGY:  OTHER: 	  	  LABS:	 	    CARDIAC MARKERS:                                12.3   8.18  )-----------( 512      ( 30 Apr 2020 07:17 )             38.0     04-30    138  |  103  |  18  ----------------------------<  112<H>  4.3   |  22  |  0.59    Ca    8.7      30 Apr 2020 07:17    TPro  7.0  /  Alb  2.9<L>  /  TBili  0.6  /  DBili  x   /  AST  112<H>  /  ALT  166<H>  /  AlkPhos  193<H>  04-30    proBNP:   Lipid Profile:   HgA1c:   TSH: Thyroid Stimulating Hormone, Serum: 0.86 uIU/mL (04-25 @ 09:41)    COVID-19 PCR . (04.24.20 @ 09:22)    COVID-19 PCR: NotDetec: This test has been validated by [facility] to be accurate;  though it has not been FDA cleared/approved by the usual pathway.  As with all laboratory tests, results should be correlated with clinical  findings.  https://www.fda.gov/media/973961/download  https://www.fda.gov/media/382134/download    < from: 12 Lead ECG (04.27.20 @ 10:26) >  Ventricular Rate 73 BPM    Atrial Rate 73 BPM    P-R Interval 160 ms    QRS Duration 94 ms    Q-T Interval 392 ms    QTC Calculation(Bezet) 431 ms    P Axis 55 degrees    R Axis -3 degrees    T Axis 35 degrees    Diagnosis Line NORMAL SINUS RHYTHM  NORMAL ECG            Assessment and plan  ---------------------------  69 yo M hx dementia nonverbal at baseline, family denies other PMHx. Per pt's daughter: pt was short of breath, productive coughing for 1 week, lethargy. Has had "on and off" for few weeks, and has been having fever Tm 104. Has not been tested for coronavirus.  Per EMS, pt has been "sick" for 3 weeks in a house with 2 covid positive family members. Per EMS pt saturating 88% on RA improved with NRB to 96%. Also per EMS rash to trunk.  while in er pt bradycardic to 50.  pt is comfortable  even covid test is negative , but pt ct/ x ray is c/w  covid  bradycardia ?etiology ?hyperkalemia, correct k level  no beta blocker  increase LFT ?sec to COVID suspected  will call family to get a better history specially meds  dvt prophylaxis  Id recommendation/ rash noted  ecg noted  bradycardia improved.  o2 level is stable

## 2020-04-30 NOTE — DISCHARGE NOTE PROVIDER - HOSPITAL COURSE
70 year old  male, nonverbal with hx of dementia presents with SOB, productive cough and lethargy x 1 week. CT scan with findings of atypical PNA most consistent with COVID-10 PNA. Patient admitted to medicine. Tested twice and negative for COVID-19. Patient requiring supplemental oxygen. Hospital course complicated with fevers with resolution with Tylenol. CT scan noted to have right inguinal hernia with portion of bladder in hernia. Urology consulted and recommended no intervention. Patient voiding during admission and required straight catheterization, started on flomax. Urology deemed patient did not require gamino as he was emptying bladder on his own. Palliative care consulted and family deemed patient full code. Supplemental oxygen weaned as tolerated. Physical therapy evaluation recommended home PT. Patient with agitation during admission and given haldol as needed. CT head negative for acute intracranial etiology. On the day of discharge, patient afebrile, oxygenating well on room air and tolerating diet. Patient deemed stable for discharge home

## 2020-04-30 NOTE — DISCHARGE NOTE PROVIDER - CARE PROVIDER_API CALL
Kenton Sapp)  Internal Medicine  [address] [place] [id number]  Phone: [phone]  Fax: [phone]  Follow Up Time: 1 week

## 2020-05-01 PROCEDURE — 99232 SBSQ HOSP IP/OBS MODERATE 35: CPT | Mod: CS

## 2020-05-01 RX ORDER — TAMSULOSIN HYDROCHLORIDE 0.4 MG/1
1 CAPSULE ORAL
Qty: 30 | Refills: 2
Start: 2020-05-01

## 2020-05-01 RX ORDER — HALOPERIDOL DECANOATE 100 MG/ML
0.5 INJECTION INTRAMUSCULAR
Refills: 0 | Status: DISCONTINUED | OUTPATIENT
Start: 2020-05-01 | End: 2020-05-02

## 2020-05-01 RX ADMIN — Medication 1 APPLICATION(S): at 16:53

## 2020-05-01 RX ADMIN — Medication 1 APPLICATION(S): at 05:30

## 2020-05-01 RX ADMIN — ENOXAPARIN SODIUM 40 MILLIGRAM(S): 100 INJECTION SUBCUTANEOUS at 11:57

## 2020-05-01 RX ADMIN — HALOPERIDOL DECANOATE 0.5 MILLIGRAM(S): 100 INJECTION INTRAMUSCULAR at 05:30

## 2020-05-01 NOTE — PROGRESS NOTE ADULT - SUBJECTIVE AND OBJECTIVE BOX
afebrile/  o2  sat is  94, on 2  liters    REVIEW OF SYSTEMS:  GEN: no fever,    no chills  RESP: no SOB,   no cough  CVS: no chest pain,   no palpitations  GI: no abdominal pain,   no nausea,   no vomiting,   no constipation,   no diarrhea  : no dysuria,   no frequency  NEURO: no headache,   no dizziness  PSYCH: no depression,   not anxious  Derm : no rash    MEDICATIONS  (STANDING):  betamethasone valerate 0.1% Cream 1 Application(s) Topical two times a day  enoxaparin Injectable 40 milliGRAM(s) SubCutaneous daily  haloperidol     Tablet 0.5 milliGRAM(s) Oral two times a day  melatonin 3 milliGRAM(s) Oral at bedtime  tamsulosin 0.4 milliGRAM(s) Oral at bedtime    MEDICATIONS  (PRN):  acetaminophen   Tablet .. 650 milliGRAM(s) Oral every 6 hours PRN Temp greater or equal to 38C (100.4F)      Vital Signs Last 24 Hrs  T(C): 36.3 (01 May 2020 05:29), Max: 36.7 (30 Apr 2020 13:16)  T(F): 97.4 (01 May 2020 05:29), Max: 98.1 (30 Apr 2020 13:16)  HR: 68 (01 May 2020 05:29) (65 - 74)  BP: 113/70 (01 May 2020 05:29) (108/66 - 114/54)  BP(mean): --  RR: 20 (01 May 2020 05:29) (20 - 22)  SpO2: 94% (01 May 2020 05:29) (91% - 94%)  CAPILLARY BLOOD GLUCOSE        I&O's Summary    30 Apr 2020 07:01  -  01 May 2020 07:00  --------------------------------------------------------  IN: 730 mL / OUT: 1050 mL / NET: -320 mL        PHYSICAL EXAM:  HEAD:  Atraumatic, Normocephalic  NECK: Supple, No   JVD  CHEST/LUNG:   no     rales,     no,    rhonchi  HEART: Regular rate and rhythm;         murmur  ABDOMEN: Soft, Nontender, ;   EXTREMITIES:     no   edema  NEUROLOGY:   advanced   dementia/  minimally  verbal  to none    LABS:                        12.3   8.18  )-----------( 512      ( 30 Apr 2020 07:17 )             38.0     04-30    138  |  103  |  18  ----------------------------<  112<H>  4.3   |  22  |  0.59    Ca    8.7      30 Apr 2020 07:17    TPro  7.0  /  Alb  2.9<L>  /  TBili  0.6  /  DBili  x   /  AST  112<H>  /  ALT  166<H>  /  AlkPhos  193<H>  04-30                    Thyroid Stimulating Hormone, Serum: 0.86 uIU/mL (04-25 @ 09:41)          Consultant(s) Notes Reviewed:      Care Discussed with Consultants/Other Providers:

## 2020-05-01 NOTE — PROGRESS NOTE ADULT - ASSESSMENT
71 yo M     hx dementia nonverbal at baseline,  has trouble  ambulating  at baseline, needs help, per  family   . Per   daughter: pt   had   short of breath, productive coughing for 1 week,  with  lethargy.    s/p   fever Tm 104. Has not been tested for coronavirus.    Per EMS, pt has been "sick" for 3 weeks in a house with 2 covid positive family members/  wife and daughter.  . Per grandson, pt's daughter Sade Roca is HCP  has  not been  ambulating  for past  2  weeks, per grandson.      sob  /  fevers and    hypoxia, , from probable  Covid, on arrival  cxr, with diffuse  b/l  opacities  from Covid   covid  test  in Er , was  negative   pt   lives  in a  house,  with 2 positive  covid  family members    per  family, upon discussing  goc,   they  stated, they  '  will  not  confirm  or decline,  at  this  time '  seen by  PCU dr Hubbard/ pt remains  full code , at  this  time     maculo papular rash, on  ant  abd  wall, HSV, is  negative    covid, known to  cause  rash  ct  noted., right  inguinal hernia  with bladder involvement,  per  urology,   no  intervention needed  as  hernia  is  c/c , and    stable, defer  elective  surgery , when needed  , if  at  all  Caroline barbour, noted,  pt with prior  h/o  dementia   and  agitation  on haldol prn , for   acute delirium/  agitation   still  febrile.  blood   c/s, negative, no  ab, per  ID  seen by PT,  recommended  home  intermittent  agitation.  restraints  for  safety by  floor    blood / urine    negative/  tsh, normal    ct  head,  marked  atrophy   elevated     inflammatory markers. from  probable  covid  pt  with  baseline  advanced  dementia/ on  haldol  does  not  need  daily  labs    pt  with   urinary  retention on 4/29, pvr  of  above  400 cc /  voiding  well, since  urology  f/p  noted    supportive care  per  ID  pt  is not  DNR, per  family  spoke  with  daughter, regarding   d/c     d/c  to home  /  care cortn note   seen  pts  dr  is dr ha ambrocio    from: Xray Chest 1 View- PORTABLE-Urgent (04.24.20 @ 07:48) >  IMPRESSION: Diffuse bilateral patchy opacities likely representing atypical/viral pneumonia. COVID 19 is considered.  < end of copied text >      < from: CT Abdomen and Pelvis w/ IV Cont (04.24.20 @ 10:00) >  IMPRESSION:   Right inguinal hernia that involves a portion of the bladder fundus. See above.  No evidence of bowel related inflammation nor obstruction.  Moderate bibasilar parenchymal infiltrates compatible with atypical pneumonia including Covid 19.  The pertinent findings were discussed with Dr. Díaz at the conclusion of today's evaluation    < end of copied text >

## 2020-05-01 NOTE — DISCHARGE NOTE NURSING/CASE MANAGEMENT/SOCIAL WORK - NSDCDMENAME_GEN_ALL_CORE_FT
[facility] , DELIVERY OF HOSPITAL BED, JOSE RAFAEL LIFT, [facility],  VENDOR WILL CALL FAMILY FOR DELIVERY TIME

## 2020-05-01 NOTE — PROGRESS NOTE ADULT - SUBJECTIVE AND OBJECTIVE BOX
CARDIOLOGY     PROGRESS  NOTE   ________________________________________________    CHIEF COMPLAINT:Patient is a 70y old  Male who presents with a chief complaint of sob (01 May 2020 09:44)  no complain.  	  REVIEW OF SYSTEMS:  CONSTITUTIONAL: No fever, weight loss, or fatigue  EYES: No eye pain, visual disturbances, or discharge  ENT:  No difficulty hearing, tinnitus, vertigo; No sinus or throat pain  NECK: No pain or stiffness  RESPIRATORY: No cough, wheezing, chills or hemoptysis; No Shortness of Breath  CARDIOVASCULAR: No chest pain, palpitations, passing out, dizziness, or leg swelling  GASTROINTESTINAL: No abdominal or epigastric pain. No nausea, vomiting, or hematemesis; No diarrhea or constipation. No melena or hematochezia.  GENITOURINARY: No dysuria, frequency, hematuria, or incontinence  NEUROLOGICAL: No headaches, memory loss, loss of strength, numbness, or tremors  SKIN: No itching, burning, rashes, or lesions   LYMPH Nodes: No enlarged glands  ENDOCRINE: No heat or cold intolerance; No hair loss  MUSCULOSKELETAL: No joint pain or swelling; No muscle, back, or extremity pain  PSYCHIATRIC: No depression, anxiety, mood swings, or difficulty sleeping  HEME/LYMPH: No easy bruising, or bleeding gums  ALLERGY AND IMMUNOLOGIC: No hives or eczema	    [ ] All others negative	  [x ] Unable to obtain    PHYSICAL EXAM:  T(C): 36.3 (05-01-20 @ 05:29), Max: 36.7 (04-30-20 @ 13:16)  HR: 68 (05-01-20 @ 05:29) (65 - 74)  BP: 113/70 (05-01-20 @ 05:29) (108/66 - 114/54)  RR: 20 (05-01-20 @ 05:29) (20 - 22)  SpO2: 94% (05-01-20 @ 05:29) (91% - 94%)  Wt(kg): --  I&O's Summary    30 Apr 2020 07:01  -  01 May 2020 07:00  --------------------------------------------------------  IN: 730 mL / OUT: 1050 mL / NET: -320 mL        Appearance: Normal	  HEENT:   Normal oral mucosa, PERRL, EOMI	  Lymphatic: No lymphadenopathy  Cardiovascular: Normal S1 S2, No JVD, + murmurs, No edema  Respiratory: Lungs clear to auscultation	  Psychiatry: A & O x 3, Mood & affect appropriate  Gastrointestinal:  Soft, Non-tender, + BS	  Skin: No rashes, No ecchymoses, No cyanosis	  Neurologic: Non-focal  Extremities: Normal range of motion, No clubbing, cyanosis or edema  Vascular: Peripheral pulses palpable 2+ bilaterally    MEDICATIONS  (STANDING):  betamethasone valerate 0.1% Cream 1 Application(s) Topical two times a day  enoxaparin Injectable 40 milliGRAM(s) SubCutaneous daily  melatonin 3 milliGRAM(s) Oral at bedtime  tamsulosin 0.4 milliGRAM(s) Oral at bedtime      TELEMETRY: 	    ECG:  	  RADIOLOGY:  OTHER: 	  	  LABS:	 	    CARDIAC MARKERS:                                12.3   8.18  )-----------( 512      ( 30 Apr 2020 07:17 )             38.0     04-30    138  |  103  |  18  ----------------------------<  112<H>  4.3   |  22  |  0.59    Ca    8.7      30 Apr 2020 07:17    TPro  7.0  /  Alb  2.9<L>  /  TBili  0.6  /  DBili  x   /  AST  112<H>  /  ALT  166<H>  /  AlkPhos  193<H>  04-30    proBNP:   Lipid Profile:   HgA1c:   TSH: Thyroid Stimulating Hormone, Serum: 0.86 uIU/mL (04-25 @ 09:41)          Assessment and plan  ---------------------------  71 yo M hx dementia nonverbal at baseline, family denies other PMHx. Per pt's daughter: pt was short of breath, productive coughing for 1 week, lethargy. Has had "on and off" for few weeks, and has been having fever Tm 104. Has not been tested for coronavirus.  Per EMS, pt has been "sick" for 3 weeks in a house with 2 covid positive family members. Per EMS pt saturating 88% on RA improved with NRB to 96%. Also per EMS rash to trunk.  while in er pt bradycardic to 50.  pt is comfortable  even covid test is negative , but pt ct/ x ray is c/w  covid  bradycardia ?etiology ?hyperkalemia, correct k level  no beta blocker  increase LFT ?sec to COVID suspected  will call family to get a better history specially meds  dvt prophylaxis  Id recommendation/ rash noted  ecg noted  bradycardia improved.  o2 level is stable

## 2020-05-01 NOTE — DISCHARGE NOTE NURSING/CASE MANAGEMENT/SOCIAL WORK - PATIENT PORTAL LINK FT
You can access the FollowMyHealth Patient Portal offered by [facility] by registering at the following website: [email or web address]. By joining [employer]’s FollowMyHealth portal, you will also be able to view your health information using other applications (apps) compatible with our system.

## 2020-05-02 VITALS
HEART RATE: 75 BPM | OXYGEN SATURATION: 95 % | SYSTOLIC BLOOD PRESSURE: 106 MMHG | RESPIRATION RATE: 22 BRPM | DIASTOLIC BLOOD PRESSURE: 66 MMHG | TEMPERATURE: 98 F

## 2020-05-02 PROCEDURE — 99238 HOSP IP/OBS DSCHRG MGMT 30/<: CPT | Mod: CS

## 2020-05-02 RX ADMIN — TAMSULOSIN HYDROCHLORIDE 0.4 MILLIGRAM(S): 0.4 CAPSULE ORAL at 05:00

## 2020-05-02 RX ADMIN — Medication 3 MILLIGRAM(S): at 04:59

## 2020-05-02 RX ADMIN — Medication 1 APPLICATION(S): at 05:00

## 2020-05-02 RX ADMIN — ENOXAPARIN SODIUM 40 MILLIGRAM(S): 100 INJECTION SUBCUTANEOUS at 12:21

## 2020-05-02 NOTE — PROGRESS NOTE ADULT - SUBJECTIVE AND OBJECTIVE BOX
dementia    REVIEW OF SYSTEMS:  GEN: no fever,    no chills  RESP: no SOB,   no cough  CVS: no chest pain,   no palpitations  GI: no abdominal pain,   no nausea,   no vomiting,   no constipation,   no diarrhea  : no dysuria,   no frequency  NEURO: no headache,   no dizziness  PSYCH: no depression,   not anxious  Derm : no rash    MEDICATIONS  (STANDING):  betamethasone valerate 0.1% Cream 1 Application(s) Topical two times a day  enoxaparin Injectable 40 milliGRAM(s) SubCutaneous daily  melatonin 3 milliGRAM(s) Oral at bedtime  tamsulosin 0.4 milliGRAM(s) Oral at bedtime    MEDICATIONS  (PRN):  acetaminophen   Tablet .. 650 milliGRAM(s) Oral every 6 hours PRN Temp greater or equal to 38C (100.4F)  haloperidol     Tablet 0.5 milliGRAM(s) Oral two times a day PRN agitation      Vital Signs Last 24 Hrs  T(C): 36.5 (02 May 2020 08:37), Max: 36.9 (01 May 2020 14:20)  T(F): 97.7 (02 May 2020 08:37), Max: 98.5 (01 May 2020 16:58)  HR: 65 (02 May 2020 08:37) (65 - 76)  BP: 104/59 (02 May 2020 08:37) (104/59 - 129/53)  BP(mean): --  RR: 22 (02 May 2020 08:37) (20 - 22)  SpO2: 94% (02 May 2020 08:37) (87% - 95%)  CAPILLARY BLOOD GLUCOSE        I&O's Summary    01 May 2020 07:01  -  02 May 2020 07:00  --------------------------------------------------------  IN: 1050 mL / OUT: 2500 mL / NET: -1450 mL    02 May 2020 07:01  -  02 May 2020 10:17  --------------------------------------------------------  IN: 240 mL / OUT: 500 mL / NET: -260 mL        PHYSICAL EXAM:  HEAD:  Atraumatic, Normocephalic  NECK: Supple, No   JVD  CHEST/LUNG:   no     rales,     no,    rhonchi  HEART: Regular rate and rhythm;         murmur  ABDOMEN: Soft, Nontender, ;   EXTREMITIES:    no    edema  NEUROLOGY:   dementia    LABS:                          Thyroid Stimulating Hormone, Serum: 0.86 uIU/mL (04-25 @ 09:41)          Consultant(s) Notes Reviewed:      Care Discussed with Consultants/Other Providers:

## 2020-05-02 NOTE — PROGRESS NOTE ADULT - ASSESSMENT
71 yo M     hx dementia nonverbal at baseline,  has trouble  ambulating  at baseline, needs help, per  family   . Per   daughter: pt   had   short of breath, productive coughing for 1 week,  with  lethargy.    s/p   fever Tm 104. Has not been tested for coronavirus.    Per EMS, pt has been "sick" for 3 weeks in a house with 2 covid positive family members/  wife and daughter.  . Per grandson, pt's daughter Sade Roca is HCP  has  not been  ambulating  for past  2  weeks, per grandson.      sob  /  fevers and    hypoxia, , from probable  Covid, on arrival  cxr, with diffuse  b/l  opacities  from Covid   covid  test  in Er , was  negative   pt   lives  in a  house,  with 2 positive  covid  family members    per  family, upon discussing  goc,   they  stated, they  '  will  not  confirm  or decline,  at  this  time '  seen by  PCU dr Hubbard/ pt remains  full code , at  this  time     maculo papular rash, on  ant  abd  wall, HSV, is  negative    covid, known to  cause  rash  ct  noted., right  inguinal hernia  with bladder involvement,  per  urology,   no  intervention needed  as  hernia  is  c/c , and    stable, defer  elective  surgery , when needed  , if  at  all  Caroline barbour, noted,  pt with prior  h/o  dementia   and  agitation  on haldol prn , for   acute delirium/  agitation   still  febrile.  blood   c/s, negative, no  ab, per  ID  seen by PT,  recommended  home  intermittent  agitation.  restraints  for  safety by  floor    blood / urine    negative/  tsh, normal    ct  head,  marked  atrophy   elevated     inflammatory markers. from  probable  covid  pt  with  baseline  advanced  dementia/ on  haldol  does  not  need  daily  labs    pt  with   urinary  retention on 4/29, pvr  of  above  400 cc /  voiding  well, since  urology  f/p  noted    supportive care  per  ID  pt  is not  DNR, per  family  spoke  with  daughter, regarding   d/c     d/c  to home  /  care cortn note   seen/  awaiting oxygen, hence   did  not leave  yesterday  cleraed for   d/c,  awiating oxygen to  be  avaailable  pts  dr  is dr ha ambrocio    from: Xray Chest 1 View- PORTABLE-Urgent (04.24.20 @ 07:48) >  IMPRESSION: Diffuse bilateral patchy opacities likely representing atypical/viral pneumonia. COVID 19 is considered.  < end of copied text >      < from: CT Abdomen and Pelvis w/ IV Cont (04.24.20 @ 10:00) >  IMPRESSION:   Right inguinal hernia that involves a portion of the bladder fundus. See above.  No evidence of bowel related inflammation nor obstruction.  Moderate bibasilar parenchymal infiltrates compatible with atypical pneumonia including Covid 19.  The pertinent findings were discussed with Dr. Díaz at the conclusion of today's evaluation    < end of copied text >

## 2020-05-02 NOTE — PROGRESS NOTE ADULT - SUBJECTIVE AND OBJECTIVE BOX
CARDIOLOGY     PROGRESS  NOTE   ________________________________________________    CHIEF COMPLAINT:Patient is a 70y old  Male who presents with a chief complaint of sob (02 May 2020 10:17)  no complain.  	  REVIEW OF SYSTEMS:  CONSTITUTIONAL: No fever, weight loss, or fatigue  EYES: No eye pain, visual disturbances, or discharge  ENT:  No difficulty hearing, tinnitus, vertigo; No sinus or throat pain  NECK: No pain or stiffness  RESPIRATORY: No cough, wheezing, chills or hemoptysis; No Shortness of Breath  CARDIOVASCULAR: No chest pain, palpitations, passing out, dizziness, or leg swelling  GASTROINTESTINAL: No abdominal or epigastric pain. No nausea, vomiting, or hematemesis; No diarrhea or constipation. No melena or hematochezia.  GENITOURINARY: No dysuria, frequency, hematuria, or incontinence  NEUROLOGICAL: No headaches, memory loss, loss of strength, numbness, or tremors  SKIN: No itching, burning, rashes, or lesions   LYMPH Nodes: No enlarged glands  ENDOCRINE: No heat or cold intolerance; No hair loss  MUSCULOSKELETAL: No joint pain or swelling; No muscle, back, or extremity pain  PSYCHIATRIC: No depression, anxiety, mood swings, or difficulty sleeping  HEME/LYMPH: No easy bruising, or bleeding gums  ALLERGY AND IMMUNOLOGIC: No hives or eczema	    [ ] All others negative	  [ x] Unable to obtain    PHYSICAL EXAM:  T(C): 36.5 (05-02-20 @ 08:37), Max: 36.9 (05-01-20 @ 14:20)  HR: 65 (05-02-20 @ 08:37) (65 - 76)  BP: 104/59 (05-02-20 @ 08:37) (104/59 - 129/53)  RR: 22 (05-02-20 @ 08:37) (20 - 22)  SpO2: 94% (05-02-20 @ 08:37) (91% - 95%)  Wt(kg): --  I&O's Summary    01 May 2020 07:01  -  02 May 2020 07:00  --------------------------------------------------------  IN: 1050 mL / OUT: 2500 mL / NET: -1450 mL    02 May 2020 07:01  -  02 May 2020 10:39  --------------------------------------------------------  IN: 240 mL / OUT: 500 mL / NET: -260 mL        Appearance: Normal	  HEENT:   Normal oral mucosa, PERRL, EOMI	  Lymphatic: No lymphadenopathy  Cardiovascular: Normal S1 S2, No JVD, + murmurs, No edema  Respiratory: Lungs clear to auscultation	  Psychiatry: A & O x 3, Mood & affect appropriate  Gastrointestinal:  Soft, Non-tender, + BS	  Skin: No rashes, No ecchymoses, No cyanosis	  Neurologic: Non-focal  Extremities: Normal range of motion, No clubbing, cyanosis or edema  Vascular: Peripheral pulses palpable 2+ bilaterally    MEDICATIONS  (STANDING):  betamethasone valerate 0.1% Cream 1 Application(s) Topical two times a day  enoxaparin Injectable 40 milliGRAM(s) SubCutaneous daily  melatonin 3 milliGRAM(s) Oral at bedtime  tamsulosin 0.4 milliGRAM(s) Oral at bedtime      TELEMETRY: 	    ECG:  	  RADIOLOGY:  OTHER: 	  	  LABS:	 	    CARDIAC MARKERS:                  proBNP:   Lipid Profile:   HgA1c:   TSH: Thyroid Stimulating Hormone, Serum: 0.86 uIU/mL (04-25 @ 09:41)          Assessment and plan  ---------------------------  69 yo M hx dementia nonverbal at baseline, family denies other PMHx. Per pt's daughter: pt was short of breath, productive coughing for 1 week, lethargy. Has had "on and off" for few weeks, and has been having fever Tm 104. Has not been tested for coronavirus.  Per EMS, pt has been "sick" for 3 weeks in a house with 2 covid positive family members. Per EMS pt saturating 88% on RA improved with NRB to 96%. Also per EMS rash to trunk.  while in er pt bradycardic to 50.  pt is comfortable  even covid test is negative , but pt ct/ x ray is c/w  covid  bradycardia ?etiology ?hyperkalemia, correct k level  no beta blocker  increase LFT ?sec to COVID suspected  will call family to get a better history specially meds  dvt prophylaxis  Id recommendation/ rash noted  ecg noted  bradycardia improved.  o2 level is stable

## 2020-06-02 PROCEDURE — 86803 HEPATITIS C AB TEST: CPT

## 2020-06-02 PROCEDURE — 97161 PT EVAL LOW COMPLEX 20 MIN: CPT

## 2020-06-02 PROCEDURE — 85379 FIBRIN DEGRADATION QUANT: CPT

## 2020-06-02 PROCEDURE — 87635 SARS-COV-2 COVID-19 AMP PRB: CPT

## 2020-06-02 PROCEDURE — 84295 ASSAY OF SERUM SODIUM: CPT

## 2020-06-02 PROCEDURE — 86780 TREPONEMA PALLIDUM: CPT

## 2020-06-02 PROCEDURE — 71045 X-RAY EXAM CHEST 1 VIEW: CPT

## 2020-06-02 PROCEDURE — 87040 BLOOD CULTURE FOR BACTERIA: CPT

## 2020-06-02 PROCEDURE — 94640 AIRWAY INHALATION TREATMENT: CPT

## 2020-06-02 PROCEDURE — 84145 PROCALCITONIN (PCT): CPT

## 2020-06-02 PROCEDURE — 82947 ASSAY GLUCOSE BLOOD QUANT: CPT

## 2020-06-02 PROCEDURE — 82330 ASSAY OF CALCIUM: CPT

## 2020-06-02 PROCEDURE — 82728 ASSAY OF FERRITIN: CPT

## 2020-06-02 PROCEDURE — 84132 ASSAY OF SERUM POTASSIUM: CPT

## 2020-06-02 PROCEDURE — 93005 ELECTROCARDIOGRAM TRACING: CPT

## 2020-06-02 PROCEDURE — 86140 C-REACTIVE PROTEIN: CPT

## 2020-06-02 PROCEDURE — 82435 ASSAY OF BLOOD CHLORIDE: CPT

## 2020-06-02 PROCEDURE — 99285 EMERGENCY DEPT VISIT HI MDM: CPT | Mod: 25

## 2020-06-02 PROCEDURE — 84443 ASSAY THYROID STIM HORMONE: CPT

## 2020-06-02 PROCEDURE — 97112 NEUROMUSCULAR REEDUCATION: CPT

## 2020-06-02 PROCEDURE — 82607 VITAMIN B-12: CPT

## 2020-06-02 PROCEDURE — 96374 THER/PROPH/DIAG INJ IV PUSH: CPT | Mod: XU

## 2020-06-02 PROCEDURE — 81001 URINALYSIS AUTO W/SCOPE: CPT

## 2020-06-02 PROCEDURE — 97110 THERAPEUTIC EXERCISES: CPT

## 2020-06-02 PROCEDURE — 83605 ASSAY OF LACTIC ACID: CPT

## 2020-06-02 PROCEDURE — 80053 COMPREHEN METABOLIC PANEL: CPT

## 2020-06-02 PROCEDURE — 82803 BLOOD GASES ANY COMBINATION: CPT

## 2020-06-02 PROCEDURE — 87086 URINE CULTURE/COLONY COUNT: CPT

## 2020-06-02 PROCEDURE — 82746 ASSAY OF FOLIC ACID SERUM: CPT

## 2020-06-02 PROCEDURE — 85014 HEMATOCRIT: CPT

## 2020-06-02 PROCEDURE — 70450 CT HEAD/BRAIN W/O DYE: CPT

## 2020-06-02 PROCEDURE — 87798 DETECT AGENT NOS DNA AMP: CPT

## 2020-06-02 PROCEDURE — 80048 BASIC METABOLIC PNL TOTAL CA: CPT

## 2020-06-02 PROCEDURE — 74177 CT ABD & PELVIS W/CONTRAST: CPT

## 2020-06-02 PROCEDURE — 83615 LACTATE (LD) (LDH) ENZYME: CPT

## 2020-06-02 PROCEDURE — 97530 THERAPEUTIC ACTIVITIES: CPT

## 2020-06-02 PROCEDURE — 87529 HSV DNA AMP PROBE: CPT

## 2020-06-02 PROCEDURE — 85027 COMPLETE CBC AUTOMATED: CPT

## 2020-08-11 NOTE — PROGRESS NOTE ADULT - PROVIDER SPECIALTY LIST ADULT
8/11/2020       RE: Ren Yung  [id number]  [place] [facility] [place] [address]     Dear Colleague,    Thank you for referring your patient, Ren Yung, to the [facility] DERMATOLOGY at [facility]. Please see a copy of my visit note below.    [facility] Dermatology Note      Dermatology Problem List:  1. Pruritic follicular based macular rash suspicious for mastocytosis.   - s/p biopsy 8/11/2020  - prior tx: prednisone taper   2. Inflamed KP vs folliculitis, last evaluated by Dr. Rogers on 9/15/2015.    - prior tx: urea cream, diflucan     Encounter Date: Aug 11, 2020    CC:   Chief Complaint   Patient presents with     Derm Problem     Ren is here today for a biopsy.          History of Present Illness:  Mr. Ren Yung is a 58 year old male who presents for biopsy of rash.  Seen via telederm onf 8/6/2020.  Patient has had ~3 year history of inflamed keratosis pilaris vs folliculitis treated with urea cream and diflucan.  In the past year, his rash spread to his abdomen, flanks and arms.  It feels extremely itchy and burning.      Of note, he has had a severe reaction to a bee sting many years ago.     Patient has not been taking any medications for the past year or more except for the methylprednisolone which he stopped taking 2 weeks ago.      Denies any fevers, cough, shortness of breath, throat swelling, muscle aches or pains, lip or tongue swelling.     Past Medical History:   There is no problem list on file for this patient.    No past medical history on file.  Past Surgical History:   Procedure Laterality Date     COLONOSCOPY  4/18/2013    Procedure: COLONOSCOPY;  Colonoscopy;  Surgeon: Erick Shahid MD;  Location: [facility]       Social History:  Patient reports that he has never smoked. He has never used smokeless tobacco. He reports current alcohol use.    Family History:  Family History   Problem Relation Age of Onset     Melanoma No  Cardiology family hx of        Medications:  Current Outpatient Medications   Medication Sig Dispense Refill     EPINEPHrine (ANY BX GENERIC EQUIV) 0.3 MG/0.3ML injection 2-pack INJECT 0.3MG IM ONCE AS A SINGLE DOSE       IBUPROFEN PO Take 200 mg by mouth.       Acetaminophen (TYLENOL PO) Take  by mouth.       ammonium lactate (LAC-HYDRIN) 12 % cream Apply topically 2 times daily as needed for dry skin (Patient not taking: Reported on 8/6/2020) 385 g 3     cetirizine (ZYRTEC) 10 MG tablet Take 1 tablet (10 mg) by mouth every evening (Patient not taking: Reported on 8/6/2020) 30 tablet 1     doxycycline (VIBRAMYCIN) 100 MG capsule Take 1 capsule (100 mg) by mouth daily with food (Patient not taking: Reported on 8/6/2020) 30 capsule 0     fluconazole (DIFLUCAN) 150 MG tablet Take 1 tablet (150 mg) by mouth daily (Patient not taking: Reported on 8/6/2020) 14 tablet 0     fluconazole (DIFLUCAN) 150 MG tablet Take 1 tablet (150 mg) by mouth once a week (Patient not taking: Reported on 8/6/2020) 4 tablet 0     HYDROcodone-acetaminophen (NORCO) 5-325 MG tablet Take 1 tablet by mouth every 8 hours as needed for moderate to severe pain (Patient not taking: Reported on 8/6/2020) 7 tablet 0     methylPREDNISolone (MEDROL DOSEPAK) 4 MG tablet therapy pack Follow Package Directions (Patient not taking: Reported on 8/6/2020) 21 tablet 0     Sildenafil Citrate (VIAGRA PO) Take 100 mg by mouth.       tamsulosin (FLOMAX) 0.4 MG 24 hr capsule Take 1 capsule (0.4 mg) by mouth daily (Patient not taking: Reported on 8/6/2020) 30 capsule 3     triamcinolone (KENALOG) 0.1 % cream Apply sparingly to affected area at bedtime (Patient not taking: Reported on 8/6/2020) 80 g 3     Urea (CARMOL 20) 20 % CREA Apply daily in morning (Patient not taking: Reported on 8/6/2020) 120 g 3        Allergies   Allergen Reactions     Bee Venom Other (See Comments)     Vision disturbances     Nkda [No Known Drug Allergies]          Review of Systems:  -As per  HPI  -Constitutional: Otherwise feeling well today, in usual state of health.  -HEENT: Patient denies nonhealing oral sores.  -Skin: As above in HPI. No additional skin concerns.    Physical exam:  Vitals: There were no vitals taken for this visit.  GEN: This is a well developed, well-nourished male in no acute distress, in a pleasant mood.    SKIN: Total skin excluding the undergarment areas was performed. The exam included the head/face, neck, both arms, chest, back, abdomen, both legs, digits and/or nails.   - reddish brown follicular based macules coalescing into plaques on the thighs, trunk and upper extremities   -No other lesions of concern on areas examined.     Impression/Plan:  1. Pruritic follicular based macular rash suspicious for mastocytosis.  Other DDx includes pigmented purpura vs inflamed keratosis pilaris vs less likely drug reaction as patient ceased all meds for the past year and rash has continued to spread.  - Punch biopsy:  After discussion of benefits and risks including but not limited to bleeding/bruising, pain/swelling, infection, scar, incomplete removal, nerve damage/numbness, recurrence, and non-diagnostic biopsy, written consent, verbal consent and photographs were obtained. Time-out was performed. The area was cleaned with isopropyl alcohol. 0.5mL of 1% lidocaine with 1:100,000 epinephrine was injected to obtain adequate anesthesia of the lesion on the left chest and the left arm.  A 3 mm punch biopsy was performed x2. 4-0 prolene sutures were utilized to approximate the epidermal edges. White petroleum jelly/Vaseline and a bandage was applied to the wound. Explicit verbal and written wound care instructions were provided. The patient left the Dermatology Clinic in good condition. The patient was counseled to follow up for suture removal in approximately 7-14 days.  - Labs: serum tryptase  - Start triamcinolone ointment bid while biopsies pending; consider nbUVB if mastocytosis is  correct dx    CC Referred Self, MD  No address on file on close of this encounter.  Follow-up with dermatopathology results       Dr. Prakash staffed the patient.    Katerina Pablo MD  Dermatology Resident, PGY2    Attending Note  Biopsies confirmed our suspicion of mastocytosis, and serum tryptase is elevated, so we will refer patient to heme-onc for further systemic workup.    I have seen and examined this patient and agree with the assessment and plan as documented in the resident's note, and was present for all procedures.    Juan Luis Prakash MD  Dermatology Attending        Lidocaine-epinephrine 1-1:187832 % injection   0.5mL once for one use, starting 8/11/2020 ending 8/11/2020,  2mL disp, R-0, injection  Injected by Dr. Pablo       Again, thank you for allowing me to participate in the care of your patient.      Sincerely,    Juan Luis Prakash MD

## 2021-01-16 NOTE — PROGRESS NOTE BEHAVIORAL HEALTH - NSBHCONSULTWRKUPYES_PSY_A_CORE
A/O x 4, forgetful.  Son at bedside to interpret.  VSS, max temp 99.9.  Denies pain.  Remote telemetry monitoring. Up with A1 to pivot to chair or BSC.  BS hypoactive.  Abdomen tender, distended and rounded.  Gas+, last BM 1/14.  Voiding adequately via urinal.  Tolerating diet, minimal appetite.  Paracentesis site CDI.  IV Rocephin.  K replaced x 2, recheck at 2130.  Rested comfortably in bed between cares.  Plan is to discharge to home when medically stable.  Will continue to monitor.   labs/EKG/imaging

## 2021-04-07 ENCOUNTER — INPATIENT (INPATIENT)
Facility: HOSPITAL | Age: 71
LOS: 0 days | Discharge: LEFT BEFORE DISCHARGE | DRG: 871 | End: 2021-04-08
Attending: INTERNAL MEDICINE | Admitting: INTERNAL MEDICINE
Payer: MEDICARE

## 2021-04-07 VITALS
TEMPERATURE: 98 F | OXYGEN SATURATION: 93 % | SYSTOLIC BLOOD PRESSURE: 122 MMHG | RESPIRATION RATE: 96 BRPM | WEIGHT: 179.9 LBS | HEIGHT: 72 IN | HEART RATE: 96 BPM | DIASTOLIC BLOOD PRESSURE: 73 MMHG

## 2021-04-07 DIAGNOSIS — J18.9 PNEUMONIA, UNSPECIFIED ORGANISM: ICD-10-CM

## 2021-04-07 DIAGNOSIS — B97.4 RESPIRATORY SYNCYTIAL VIRUS AS THE CAUSE OF DISEASES CLASSIFIED ELSEWHERE: ICD-10-CM

## 2021-04-07 LAB
ALBUMIN SERPL ELPH-MCNC: 3.5 G/DL — SIGNIFICANT CHANGE UP (ref 3.3–5)
ALP SERPL-CCNC: 117 U/L — SIGNIFICANT CHANGE UP (ref 40–120)
ALT FLD-CCNC: 32 U/L — SIGNIFICANT CHANGE UP (ref 10–45)
ANION GAP SERPL CALC-SCNC: 12 MMOL/L — SIGNIFICANT CHANGE UP (ref 5–17)
APPEARANCE UR: CLEAR — SIGNIFICANT CHANGE UP
APTT BLD: 29.9 SEC — SIGNIFICANT CHANGE UP (ref 27.5–35.5)
AST SERPL-CCNC: 24 U/L — SIGNIFICANT CHANGE UP (ref 10–40)
BASE EXCESS BLDV CALC-SCNC: 4.1 MMOL/L — HIGH (ref -2–2)
BASOPHILS # BLD AUTO: 0.08 K/UL — SIGNIFICANT CHANGE UP (ref 0–0.2)
BASOPHILS NFR BLD AUTO: 0.9 % — SIGNIFICANT CHANGE UP (ref 0–2)
BILIRUB SERPL-MCNC: 0.3 MG/DL — SIGNIFICANT CHANGE UP (ref 0.2–1.2)
BILIRUB UR-MCNC: NEGATIVE — SIGNIFICANT CHANGE UP
BUN SERPL-MCNC: 14 MG/DL — SIGNIFICANT CHANGE UP (ref 7–23)
CA-I SERPL-SCNC: 1.23 MMOL/L — SIGNIFICANT CHANGE UP (ref 1.12–1.3)
CALCIUM SERPL-MCNC: 9.2 MG/DL — SIGNIFICANT CHANGE UP (ref 8.4–10.5)
CHLORIDE BLDV-SCNC: 102 MMOL/L — SIGNIFICANT CHANGE UP (ref 96–108)
CHLORIDE SERPL-SCNC: 100 MMOL/L — SIGNIFICANT CHANGE UP (ref 96–108)
CO2 BLDV-SCNC: 32 MMOL/L — HIGH (ref 22–30)
CO2 SERPL-SCNC: 25 MMOL/L — SIGNIFICANT CHANGE UP (ref 22–31)
COLOR SPEC: YELLOW — SIGNIFICANT CHANGE UP
CREAT SERPL-MCNC: 0.62 MG/DL — SIGNIFICANT CHANGE UP (ref 0.5–1.3)
DIFF PNL FLD: NEGATIVE — SIGNIFICANT CHANGE UP
EOSINOPHIL # BLD AUTO: 0.13 K/UL — SIGNIFICANT CHANGE UP (ref 0–0.5)
EOSINOPHIL NFR BLD AUTO: 1.4 % — SIGNIFICANT CHANGE UP (ref 0–6)
GAS PNL BLDV: 136 MMOL/L — SIGNIFICANT CHANGE UP (ref 135–145)
GAS PNL BLDV: SIGNIFICANT CHANGE UP
GAS PNL BLDV: SIGNIFICANT CHANGE UP
GLUCOSE BLDV-MCNC: 121 MG/DL — HIGH (ref 70–99)
GLUCOSE SERPL-MCNC: 122 MG/DL — HIGH (ref 70–99)
GLUCOSE UR QL: NEGATIVE — SIGNIFICANT CHANGE UP
HCO3 BLDV-SCNC: 30 MMOL/L — HIGH (ref 21–29)
HCT VFR BLD CALC: 41.4 % — SIGNIFICANT CHANGE UP (ref 39–50)
HCT VFR BLDA CALC: 45 % — SIGNIFICANT CHANGE UP (ref 39–50)
HGB BLD CALC-MCNC: 14.6 G/DL — SIGNIFICANT CHANGE UP (ref 13–17)
HGB BLD-MCNC: 13.6 G/DL — SIGNIFICANT CHANGE UP (ref 13–17)
IMM GRANULOCYTES NFR BLD AUTO: 0.9 % — SIGNIFICANT CHANGE UP (ref 0–1.5)
INR BLD: 1.19 RATIO — HIGH (ref 0.88–1.16)
KETONES UR-MCNC: NEGATIVE — SIGNIFICANT CHANGE UP
LACTATE BLDV-MCNC: 1.4 MMOL/L — SIGNIFICANT CHANGE UP (ref 0.7–2)
LEUKOCYTE ESTERASE UR-ACNC: NEGATIVE — SIGNIFICANT CHANGE UP
LYMPHOCYTES # BLD AUTO: 2.13 K/UL — SIGNIFICANT CHANGE UP (ref 1–3.3)
LYMPHOCYTES # BLD AUTO: 23.5 % — SIGNIFICANT CHANGE UP (ref 13–44)
MCHC RBC-ENTMCNC: 30.7 PG — SIGNIFICANT CHANGE UP (ref 27–34)
MCHC RBC-ENTMCNC: 32.9 GM/DL — SIGNIFICANT CHANGE UP (ref 32–36)
MCV RBC AUTO: 93.5 FL — SIGNIFICANT CHANGE UP (ref 80–100)
MONOCYTES # BLD AUTO: 1.01 K/UL — HIGH (ref 0–0.9)
MONOCYTES NFR BLD AUTO: 11.1 % — SIGNIFICANT CHANGE UP (ref 2–14)
NEUTROPHILS # BLD AUTO: 5.65 K/UL — SIGNIFICANT CHANGE UP (ref 1.8–7.4)
NEUTROPHILS NFR BLD AUTO: 62.2 % — SIGNIFICANT CHANGE UP (ref 43–77)
NITRITE UR-MCNC: NEGATIVE — SIGNIFICANT CHANGE UP
NRBC # BLD: 0 /100 WBCS — SIGNIFICANT CHANGE UP (ref 0–0)
PCO2 BLDV: 54 MMHG — SIGNIFICANT CHANGE UP (ref 42–55)
PH BLDV: 7.37 — SIGNIFICANT CHANGE UP (ref 7.35–7.45)
PH UR: 7 — SIGNIFICANT CHANGE UP (ref 5–8)
PLATELET # BLD AUTO: 353 K/UL — SIGNIFICANT CHANGE UP (ref 150–400)
PO2 BLDV: 30 MMHG — SIGNIFICANT CHANGE UP (ref 25–45)
POTASSIUM BLDV-SCNC: 4.3 MMOL/L — SIGNIFICANT CHANGE UP (ref 3.5–5.3)
POTASSIUM SERPL-MCNC: 4.6 MMOL/L — SIGNIFICANT CHANGE UP (ref 3.5–5.3)
POTASSIUM SERPL-SCNC: 4.6 MMOL/L — SIGNIFICANT CHANGE UP (ref 3.5–5.3)
PROT SERPL-MCNC: 7.2 G/DL — SIGNIFICANT CHANGE UP (ref 6–8.3)
PROT UR-MCNC: SIGNIFICANT CHANGE UP
PROTHROM AB SERPL-ACNC: 14.2 SEC — HIGH (ref 10.6–13.6)
RBC # BLD: 4.43 M/UL — SIGNIFICANT CHANGE UP (ref 4.2–5.8)
RBC # FLD: 12.8 % — SIGNIFICANT CHANGE UP (ref 10.3–14.5)
SAO2 % BLDV: 46 % — LOW (ref 67–88)
SODIUM SERPL-SCNC: 137 MMOL/L — SIGNIFICANT CHANGE UP (ref 135–145)
SP GR SPEC: 1.02 — SIGNIFICANT CHANGE UP (ref 1.01–1.02)
UROBILINOGEN FLD QL: NEGATIVE — SIGNIFICANT CHANGE UP
WBC # BLD: 9.08 K/UL — SIGNIFICANT CHANGE UP (ref 3.8–10.5)
WBC # FLD AUTO: 9.08 K/UL — SIGNIFICANT CHANGE UP (ref 3.8–10.5)

## 2021-04-07 PROCEDURE — 99285 EMERGENCY DEPT VISIT HI MDM: CPT | Mod: CS

## 2021-04-07 PROCEDURE — 93010 ELECTROCARDIOGRAM REPORT: CPT

## 2021-04-07 PROCEDURE — 99223 1ST HOSP IP/OBS HIGH 75: CPT

## 2021-04-07 PROCEDURE — 71045 X-RAY EXAM CHEST 1 VIEW: CPT | Mod: 26

## 2021-04-07 RX ORDER — AZITHROMYCIN 500 MG/1
500 TABLET, FILM COATED ORAL ONCE
Refills: 0 | Status: COMPLETED | OUTPATIENT
Start: 2021-04-07 | End: 2021-04-07

## 2021-04-07 RX ORDER — CEFTRIAXONE 500 MG/1
1000 INJECTION, POWDER, FOR SOLUTION INTRAMUSCULAR; INTRAVENOUS ONCE
Refills: 0 | Status: COMPLETED | OUTPATIENT
Start: 2021-04-07 | End: 2021-04-07

## 2021-04-07 RX ORDER — SODIUM CHLORIDE 9 MG/ML
1000 INJECTION INTRAMUSCULAR; INTRAVENOUS; SUBCUTANEOUS ONCE
Refills: 0 | Status: COMPLETED | OUTPATIENT
Start: 2021-04-07 | End: 2021-04-07

## 2021-04-07 RX ORDER — ACETAMINOPHEN 500 MG
650 TABLET ORAL ONCE
Refills: 0 | Status: COMPLETED | OUTPATIENT
Start: 2021-04-07 | End: 2021-04-07

## 2021-04-07 RX ADMIN — Medication 650 MILLIGRAM(S): at 22:17

## 2021-04-07 RX ADMIN — CEFTRIAXONE 100 MILLIGRAM(S): 500 INJECTION, POWDER, FOR SOLUTION INTRAMUSCULAR; INTRAVENOUS at 22:57

## 2021-04-07 NOTE — H&P ADULT - NSHPSOCIALHISTORY_GEN_ALL_CORE
Social History:  Lives with: (  ) alone  ( x ) children   (  ) spouse   (  ) parents  (  ) other    Substance Use (street drugs): (x  ) never used  (  ) other:  Tobacco Usage:  ( x  ) never smoked   (   ) former smoker   (   ) current smoker  (     ) pack year  (        ) last cigarette date  Alcohol Usage: no etoh use

## 2021-04-07 NOTE — ED PROVIDER NOTE - ATTENDING CONTRIBUTION TO CARE
71y M pt  with PMHx of Dementia presents with cough and sob since yesterday with low grade fever here to start sepsis order set, iv abx with likely pna noted lll, labs sent, pox mid 90s. rhonchi noted to l leg, abd soft, nt likely admission for medicine. no resp distress, had covid pna last year when she was admitted.

## 2021-04-07 NOTE — H&P ADULT - HISTORY OF PRESENT ILLNESS
71 year old male, nonverbal with hx of dementia , hospitalized March ’20 for viral pneumonia treated as covid  although tested negative , presents for worsening cough and shortness of breath for one day   71 year old male, nonverbal with hx of dementia , hospitalized March ’20 for viral pneumonia treated as covid  although tested negative , presents for worsening cough and shortness of breath for one day  Per family patient has a chronic cough since his hospitalization on year ago. However on day prior to admission , has increased  coughing and gargling , no sputum at times appeared to have difficulty breathing .  Patient had no fever , no sick contacts.  Family reports frequent coughing with feeding.

## 2021-04-07 NOTE — ED ADULT NURSE REASSESSMENT NOTE - NS ED NURSE REASSESS COMMENT FT1
Pt straight cath for sterile urine specimen sample. Procedure explained to patient's family member who is at bedside, pt verbalized understanding. Sterile technique used, two RNs at bedside to confirm. No resistance felt, no pain noted from patient, no blood on output. About 600cc of urine output. Catheter removed with out pain or resistance.

## 2021-04-07 NOTE — ED PROVIDER NOTE - PHYSICAL EXAMINATION
CONSTITUTIONAL: Patient is awake, non-verbal. Patient is chronically appearing and in no acute distress.  HEAD: NCAT,   NECK: supple,   LUNGS: B/l mild rhonchi   HEART: RRR,   ABDOMEN: Soft nd/nttp  EXTREMITY: no edema b/l  SKIN: with no rash or lesions.   NEURO: No focal deficits

## 2021-04-07 NOTE — H&P ADULT - NSHPLABSRESULTS_GEN_ALL_CORE
Labs personally reviewed:                          13.6   9.08  )-----------( 353      ( 2021 22:25 )             41.4         137  |  100  |  14  ----------------------------<  122<H>  4.6   |  25  |  0.62    Ca    9.2      2021 22:25    TPro  7.2  /  Alb  3.5  /  TBili  0.3  /  DBili  x   /  AST  24  /  ALT  32  /  AlkPhos  117  04-        LIVER FUNCTIONS - ( 2021 22:25 )  Alb: 3.5 g/dL / Pro: 7.2 g/dL / ALK PHOS: 117 U/L / ALT: 32 U/L / AST: 24 U/L / GGT: x           PT/INR - ( 2021 22:25 )   PT: 14.2 sec;   INR: 1.19 ratio         PTT - ( 2021 22:25 )  PTT:29.9 sec  Urinalysis Basic - ( 2021 22:25 )    Color: Yellow / Appearance: Clear / S.020 / pH: x  Gluc: x / Ketone: Negative  / Bili: Negative / Urobili: Negative   Blood: x / Protein: Trace / Nitrite: Negative   Leuk Esterase: Negative / RBC: x / WBC x   Sq Epi: x / Non Sq Epi: x / Bacteria: x      CAPILLARY BLOOD GLUCOSE      POCT Blood Glucose.: 117 mg/dL (2021 21:56)      Imaging:  CXR personally reviewed: Patchy opacities in the bilateral lower lung fields, left greater than right. These findings are concerning for infection.    EKG personally reviewed: normal sinus rhythm at 88 bpm

## 2021-04-07 NOTE — H&P ADULT - NSHPPHYSICALEXAM_GEN_ALL_CORE
Vital Signs Last 24 Hrs  T(C): 37.9 (07 Apr 2021 23:22), Max: 37.9 (07 Apr 2021 23:22)  T(F): 100.2 (07 Apr 2021 23:22), Max: 100.2 (07 Apr 2021 23:22)  HR: 87 (07 Apr 2021 23:22) (87 - 96)  BP: 129/83 (07 Apr 2021 23:22) (122/73 - 129/83)  BP(mean): --  RR: 25 (07 Apr 2021 23:22) (18 - 25)  SpO2: 97% (07 Apr 2021 23:22) (93% - 97%) Vital Signs Last 24 Hrs  T(C): 37.9 (07 Apr 2021 23:22), Max: 37.9 (07 Apr 2021 23:22)  T(F): 100.2 (07 Apr 2021 23:22), Max: 100.2 (07 Apr 2021 23:22)  HR: 87 (07 Apr 2021 23:22) (87 - 96)  BP: 129/83 (07 Apr 2021 23:22) (122/73 - 129/83)  BP(mean): --  RR: 25 (07 Apr 2021 23:22) (18 - 25)  SpO2: 97% (07 Apr 2021 23:22) (93% - 97%)    GENERAL:  asleep with eyes closed , does not respond to voice , winces to tactile stimuli , withdraws from pain , fidgeting , breathing mildly labored , upper airway gargling , no coughing   HEAD:  Atraumatic, Normocephalic  ENT: EOMI, PERRLA, conjunctiva and sclera clear,  moist mucosa no pharyngeal erythema or exudates   NECK: supple , no JVD   CHEST/LUNG:  transmitted upper airway breath sounds ,  No wheeze, equal breath sounds bilaterally   BACK: No spinal tenderness,  No CVA tenderness   HEART: Regular rate and rhythm; No murmurs, rubs, or gallops  ABDOMEN: Soft, Nontender, Nondistended; Bowel sounds present  EXTREMITIES:  No clubbing, cyanosis, or edema  MSK: No joint swelling or effusions, ROM intact   PSYCH: Normal behavior/affect  NEUROLOGY: AAOx0, non-focal, moving all extremities   SKIN: Normal color, No rashes or lesions

## 2021-04-07 NOTE — ED PROVIDER NOTE - RAPID ASSESSMENT
71y M pt (son by side) with PMHx of Dementia (at baseline, pt is minimally verbal and requires assistance with daily living activities), COVID PNA in 3/2020 (with admission) presents to the ED c/o worsening congestion with increased phlegm production and cough for the past year. Son states that he brought pt in as pt was unable to sleep and was turning red in the face last night.     Hope MEYERS (Scribe) have documented this rapid assessment note under the dictation of Sanju REYEZ) which has been reviewed and affirmed to be accurate. Patient was seen as a QPA patient. The patient will be seen and further worked up in the main emergency department and their care will be completed by the main emergency department team along with a thorough physical exam. Receiving team will follow up on labs, analgesia, any clinical imaging, reassess and disposition as clinically indicated, all decisions regarding the progression of care will be made at their discretion. 71y M pt (son by side) with PMHx of Dementia (at baseline, pt is minimally verbal and requires assistance with daily living activities), COVID PNA in 3/2020 (with admission) presents to the ED c/o worsening congestion with increased phlegm production and cough that has been chronic for approx 1yr, worse this week with noted increased respiratory effort. Son states that he brought pt in as pt was unable to sleep and was turning red in the face last night. pt unable to state if feeling SOB or having CP. son reports no recorded fevers at home, adequate PO intake at home.    Hope MEYERS (Giovana) have documented this rapid assessment note under the dictation of Sanju REYEZ) which has been reviewed and affirmed to be accurate. Patient was seen as a QPA patient. The patient will be seen and further worked up in the main emergency department and their care will be completed by the main emergency department team along with a thorough physical exam. Receiving team will follow up on labs, analgesia, any clinical imaging, reassess and disposition as clinically indicated, all decisions regarding the progression of care will be made at their discretion.    Sanju MEYERS PA-C saw patient as a rapid assessment initially via telemedicine encounter. The rest of care to be performed by the primary ED team. Rapid assessment documented by giovana in my presence. I have personally reviewed and approved the note written by the giovana. Receiving team will follow up on labs, analgesia, any clinical imaging, and perform reassessment and disposition of the patient as clinically indicated. All decisions regarding the progression of care will be made at their discretion.

## 2021-04-07 NOTE — ED PROVIDER NOTE - OBJECTIVE STATEMENT
71 year old  nonverbal at baseline with pmhx of dementia presents with SOB and productive cough over last few days. Pt primary care take is wife, brought to ED by grandson who reports pt had covid 3/2020 and has had residual cough since. Over past few days pt has had worsened productive cough w/ SOB and respiratory distress last night. Otherwise pt has been mentating at baseline. Family denies sick contacts, fevers, vomiting diarrhea

## 2021-04-07 NOTE — ED ADULT NURSE NOTE - NSIMPLEMENTINTERV_GEN_ALL_ED
Implemented All Fall with Harm Risk Interventions:  [place] to call system. Call bell, personal items and telephone within reach. Instruct patient to call for assistance. Room bathroom lighting operational. Non-slip footwear when patient is off stretcher. Physically safe environment: no spills, clutter or unnecessary equipment. Stretcher in lowest position, wheels locked, appropriate side rails in place. Provide visual cue, wrist band, yellow gown, etc. Monitor gait and stability. Monitor for mental status changes and reorient to person, place, and time. Review medications for side effects contributing to fall risk. Reinforce activity limits and safety measures with patient and family. Provide visual clues: red socks.

## 2021-04-07 NOTE — ED ADULT NURSE NOTE - OBJECTIVE STATEMENT
71 year old male coming in for difficulty breathing. The patients grandson is at the bedside, patient is non verbal and has advanced dementia. The grandson states that over the past few days they have noticed shortness of breath, a worsening and productive cough over. The patient had presumed COVID in March of last year and has had a chough since then but it has been worsening the past few days. The grandson also noted that the patient has been pulling at his ears as well and is concerned for an ear infection as well. PT arrives agitated unable to follow commands and non verbal. Grandson states this is the patients baseline. He is incontinent of stool and urine. He dopes not walk at baseline they help him use the wheel chair. The grandson denies any fever. No change in PO intake. 71 year old male coming in for difficulty breathing. The patients grandson is at the bedside, patient is non verbal and has advanced dementia. The grandson states that over the past few days they have noticed shortness of breath, a worsening and productive cough over. The patient had presumed COVID in March of last year and has had a chough since then but it has been worsening the past few days. The grandson also noted that the patient has been pulling at his ears as well and is concerned for an ear infection as well. Pt arrives agitated unable to follow commands and non verbal. Grandson states this is the patients baseline. He is incontinent of stool and urine, no change in either. He dopes not walk at baseline they help him use the wheel chair. The grandson denies any fever. No change in PO intake. 71 year old male coming in for difficulty breathing. The patients grandson is at the bedside, patient is non verbal and has advanced dementia. The grandson states that over the past few days they have noticed shortness of breath, a worsening and productive cough over. The patient had presumed COVID in March of last year and has had a chough since then but it has been worsening the past few days. The grandson also noted that the patient has been pulling at his ears as well and is concerned for an ear infection as well. Pt arrives agitated unable to follow commands and non verbal. Grandson states this is the patients baseline. He is incontinent of stool and urine, no change in either. He dopes not walk at baseline they help him use the wheel chair. The grandson denies any fever. No change in PO intake. Skin on buttocks is intact but redness blanchable

## 2021-04-07 NOTE — H&P ADULT - PROBLEM SELECTOR PLAN 1
low grade temperature ,  opacities on cxr c/w infection , suspect aspiration pna   - continue ceftriaxone and azithromycin   - f/u covid pcr

## 2021-04-08 VITALS
OXYGEN SATURATION: 95 % | SYSTOLIC BLOOD PRESSURE: 127 MMHG | DIASTOLIC BLOOD PRESSURE: 77 MMHG | HEART RATE: 71 BPM | RESPIRATION RATE: 18 BRPM | TEMPERATURE: 98 F

## 2021-04-08 DIAGNOSIS — J18.9 PNEUMONIA, UNSPECIFIED ORGANISM: ICD-10-CM

## 2021-04-08 DIAGNOSIS — R13.10 DYSPHAGIA, UNSPECIFIED: ICD-10-CM

## 2021-04-08 DIAGNOSIS — F03.90 UNSPECIFIED DEMENTIA WITHOUT BEHAVIORAL DISTURBANCE: ICD-10-CM

## 2021-04-08 DIAGNOSIS — Z29.9 ENCOUNTER FOR PROPHYLACTIC MEASURES, UNSPECIFIED: ICD-10-CM

## 2021-04-08 LAB
ALBUMIN SERPL ELPH-MCNC: 3.5 G/DL — SIGNIFICANT CHANGE UP (ref 3.3–5)
ALP SERPL-CCNC: 109 U/L — SIGNIFICANT CHANGE UP (ref 40–120)
ALT FLD-CCNC: 26 U/L — SIGNIFICANT CHANGE UP (ref 10–45)
ANION GAP SERPL CALC-SCNC: 12 MMOL/L — SIGNIFICANT CHANGE UP (ref 5–17)
AST SERPL-CCNC: 18 U/L — SIGNIFICANT CHANGE UP (ref 10–40)
BASOPHILS # BLD AUTO: 0.08 K/UL — SIGNIFICANT CHANGE UP (ref 0–0.2)
BASOPHILS NFR BLD AUTO: 1.2 % — SIGNIFICANT CHANGE UP (ref 0–2)
BILIRUB SERPL-MCNC: 0.4 MG/DL — SIGNIFICANT CHANGE UP (ref 0.2–1.2)
BUN SERPL-MCNC: 10 MG/DL — SIGNIFICANT CHANGE UP (ref 7–23)
CALCIUM SERPL-MCNC: 9.8 MG/DL — SIGNIFICANT CHANGE UP (ref 8.4–10.5)
CHLORIDE SERPL-SCNC: 102 MMOL/L — SIGNIFICANT CHANGE UP (ref 96–108)
CO2 SERPL-SCNC: 25 MMOL/L — SIGNIFICANT CHANGE UP (ref 22–31)
CREAT SERPL-MCNC: 0.66 MG/DL — SIGNIFICANT CHANGE UP (ref 0.5–1.3)
CULTURE RESULTS: NO GROWTH — SIGNIFICANT CHANGE UP
EOSINOPHIL # BLD AUTO: 0.16 K/UL — SIGNIFICANT CHANGE UP (ref 0–0.5)
EOSINOPHIL NFR BLD AUTO: 2.3 % — SIGNIFICANT CHANGE UP (ref 0–6)
GLUCOSE SERPL-MCNC: 118 MG/DL — HIGH (ref 70–99)
HCT VFR BLD CALC: 41.7 % — SIGNIFICANT CHANGE UP (ref 39–50)
HGB BLD-MCNC: 13.9 G/DL — SIGNIFICANT CHANGE UP (ref 13–17)
IMM GRANULOCYTES NFR BLD AUTO: 1.3 % — SIGNIFICANT CHANGE UP (ref 0–1.5)
LYMPHOCYTES # BLD AUTO: 1.63 K/UL — SIGNIFICANT CHANGE UP (ref 1–3.3)
LYMPHOCYTES # BLD AUTO: 23.6 % — SIGNIFICANT CHANGE UP (ref 13–44)
MCHC RBC-ENTMCNC: 31 PG — SIGNIFICANT CHANGE UP (ref 27–34)
MCHC RBC-ENTMCNC: 33.3 GM/DL — SIGNIFICANT CHANGE UP (ref 32–36)
MCV RBC AUTO: 92.9 FL — SIGNIFICANT CHANGE UP (ref 80–100)
MONOCYTES # BLD AUTO: 0.81 K/UL — SIGNIFICANT CHANGE UP (ref 0–0.9)
MONOCYTES NFR BLD AUTO: 11.7 % — SIGNIFICANT CHANGE UP (ref 2–14)
NEUTROPHILS # BLD AUTO: 4.13 K/UL — SIGNIFICANT CHANGE UP (ref 1.8–7.4)
NEUTROPHILS NFR BLD AUTO: 59.9 % — SIGNIFICANT CHANGE UP (ref 43–77)
NRBC # BLD: 0 /100 WBCS — SIGNIFICANT CHANGE UP (ref 0–0)
PLATELET # BLD AUTO: 332 K/UL — SIGNIFICANT CHANGE UP (ref 150–400)
POTASSIUM SERPL-MCNC: 4 MMOL/L — SIGNIFICANT CHANGE UP (ref 3.5–5.3)
POTASSIUM SERPL-SCNC: 4 MMOL/L — SIGNIFICANT CHANGE UP (ref 3.5–5.3)
PROT SERPL-MCNC: 7.2 G/DL — SIGNIFICANT CHANGE UP (ref 6–8.3)
RBC # BLD: 4.49 M/UL — SIGNIFICANT CHANGE UP (ref 4.2–5.8)
RBC # FLD: 12.9 % — SIGNIFICANT CHANGE UP (ref 10.3–14.5)
SARS-COV-2 RNA SPEC QL NAA+PROBE: SIGNIFICANT CHANGE UP
SODIUM SERPL-SCNC: 139 MMOL/L — SIGNIFICANT CHANGE UP (ref 135–145)
SPECIMEN SOURCE: SIGNIFICANT CHANGE UP
WBC # BLD: 6.9 K/UL — SIGNIFICANT CHANGE UP (ref 3.8–10.5)
WBC # FLD AUTO: 6.9 K/UL — SIGNIFICANT CHANGE UP (ref 3.8–10.5)

## 2021-04-08 PROCEDURE — 81003 URINALYSIS AUTO W/O SCOPE: CPT

## 2021-04-08 PROCEDURE — 83605 ASSAY OF LACTIC ACID: CPT

## 2021-04-08 PROCEDURE — 82330 ASSAY OF CALCIUM: CPT

## 2021-04-08 PROCEDURE — 85610 PROTHROMBIN TIME: CPT

## 2021-04-08 PROCEDURE — 99238 HOSP IP/OBS DSCHRG MGMT 30/<: CPT

## 2021-04-08 PROCEDURE — 85730 THROMBOPLASTIN TIME PARTIAL: CPT

## 2021-04-08 PROCEDURE — 82435 ASSAY OF BLOOD CHLORIDE: CPT

## 2021-04-08 PROCEDURE — U0005: CPT

## 2021-04-08 PROCEDURE — 84295 ASSAY OF SERUM SODIUM: CPT

## 2021-04-08 PROCEDURE — 80053 COMPREHEN METABOLIC PANEL: CPT

## 2021-04-08 PROCEDURE — 82962 GLUCOSE BLOOD TEST: CPT

## 2021-04-08 PROCEDURE — 85018 HEMOGLOBIN: CPT

## 2021-04-08 PROCEDURE — 85025 COMPLETE CBC W/AUTO DIFF WBC: CPT

## 2021-04-08 PROCEDURE — 87086 URINE CULTURE/COLONY COUNT: CPT

## 2021-04-08 PROCEDURE — 85014 HEMATOCRIT: CPT

## 2021-04-08 PROCEDURE — 96374 THER/PROPH/DIAG INJ IV PUSH: CPT

## 2021-04-08 PROCEDURE — 99285 EMERGENCY DEPT VISIT HI MDM: CPT | Mod: 25

## 2021-04-08 PROCEDURE — 82947 ASSAY GLUCOSE BLOOD QUANT: CPT

## 2021-04-08 PROCEDURE — U0003: CPT

## 2021-04-08 PROCEDURE — 87040 BLOOD CULTURE FOR BACTERIA: CPT

## 2021-04-08 PROCEDURE — 84132 ASSAY OF SERUM POTASSIUM: CPT

## 2021-04-08 PROCEDURE — 82803 BLOOD GASES ANY COMBINATION: CPT

## 2021-04-08 PROCEDURE — 71045 X-RAY EXAM CHEST 1 VIEW: CPT

## 2021-04-08 RX ORDER — CEFTRIAXONE 500 MG/1
1000 INJECTION, POWDER, FOR SOLUTION INTRAMUSCULAR; INTRAVENOUS EVERY 24 HOURS
Refills: 0 | Status: DISCONTINUED | OUTPATIENT
Start: 2021-04-08 | End: 2021-04-08

## 2021-04-08 RX ORDER — AZITHROMYCIN 500 MG/1
500 TABLET, FILM COATED ORAL EVERY 24 HOURS
Refills: 0 | Status: DISCONTINUED | OUTPATIENT
Start: 2021-04-08 | End: 2021-04-08

## 2021-04-08 RX ORDER — HEPARIN SODIUM 5000 [USP'U]/ML
5000 INJECTION INTRAVENOUS; SUBCUTANEOUS EVERY 12 HOURS
Refills: 0 | Status: DISCONTINUED | OUTPATIENT
Start: 2021-04-08 | End: 2021-04-08

## 2021-04-08 RX ORDER — ACETAMINOPHEN 500 MG
650 TABLET ORAL EVERY 4 HOURS
Refills: 0 | Status: DISCONTINUED | OUTPATIENT
Start: 2021-04-08 | End: 2021-04-08

## 2021-04-08 RX ADMIN — AZITHROMYCIN 250 MILLIGRAM(S): 500 TABLET, FILM COATED ORAL at 00:03

## 2021-04-08 RX ADMIN — SODIUM CHLORIDE 1000 MILLILITER(S): 9 INJECTION INTRAMUSCULAR; INTRAVENOUS; SUBCUTANEOUS at 00:03

## 2021-04-08 RX ADMIN — Medication 650 MILLIGRAM(S): at 01:12

## 2021-04-08 NOTE — DISCHARGE NOTE NURSING/CASE MANAGEMENT/SOCIAL WORK - PATIENT PORTAL LINK FT
You can access the FollowMyHealth Patient Portal offered by [facility] by registering at the following website: [email or web address]. By joining [facility]’s FollowMyHealth portal, you will also be able to view your health information using other applications (apps) compatible with our system.

## 2021-04-08 NOTE — DISCHARGE NOTE PROVIDER - NSDCCPCAREPLAN_GEN_ALL_CORE_FT
PRINCIPAL DISCHARGE DIAGNOSIS  Diagnosis: Pneumonia  Assessment and Plan of Treatment: pt leaving AMA  Follow with PMD  Take antibiiotic augumentin as prescribed

## 2021-04-08 NOTE — DISCHARGE NOTE PROVIDER - HOSPITAL COURSE
71 M  nonverbal with hx of dementia ,  p/w cough and SOB x 1 day  admitted with   pneumonia, but daughter and grand son wants to AMA pt now. Explained to family in length regarding risk of leaving AMA. But daughter wants to follow with primary physician. Will discharge pt with Augmentin.

## 2021-04-08 NOTE — DISCHARGE NOTE PROVIDER - CARE PROVIDER_API CALL
Kenton Sapp J  INTERNAL MEDICINE  [address] [place] [address]  Phone: [phone]  Fax: [phone]  Follow Up Time:

## 2021-04-08 NOTE — CHART NOTE - NSCHARTNOTEFT_GEN_A_CORE
Was called by RN due to patient wanting to sign out AMA.Spoke to daugher and grandson why she wanted to leave, reports does not want father/ grandfather to stay in the hospital and wants to follow up with PMD.     Patient is AAO x . I explained at length to the daughter and gradsone the risks of signing out AMA including but not limited to harm, injury or death. I explained the risks, benefits and alternatives to treatment as well as the attendant risks of refusing treatment at this time. I offered to answer any questions and fully answered any such questions. We believe that the patient fully understands what has been explained and answered. I informed hospitalist Dr. Whalen of this, aware. Patient signed form to sign out AMA and accepts responsibility for any and all results of this decision.    Josh Chiu NP  [phone]

## 2021-04-08 NOTE — ED ADULT NURSE REASSESSMENT NOTE - NS ED NURSE REASSESS COMMENT FT1
Family member at bedside requesting to stay with patient throughout admission. Pt family member educated on visitor policy for the hospital at this time, charge nurse Jen made aware. We will allow the patient family member to stay at this time to aid in the care fo the patient. The family member states that if they ask him to leave he will sign the patient out AMA. The hospitalist is at bedside and also aware of the situation.

## 2021-04-13 LAB
CULTURE RESULTS: SIGNIFICANT CHANGE UP
SPECIMEN SOURCE: SIGNIFICANT CHANGE UP

## 2021-08-04 ENCOUNTER — INPATIENT (INPATIENT)
Facility: HOSPITAL | Age: 71
LOS: 5 days | Discharge: HOME | DRG: 640 | End: 2021-08-10
Attending: INTERNAL MEDICINE | Admitting: INTERNAL MEDICINE
Payer: MEDICARE

## 2021-08-04 VITALS
DIASTOLIC BLOOD PRESSURE: 74 MMHG | TEMPERATURE: 100 F | OXYGEN SATURATION: 95 % | HEIGHT: 72 IN | RESPIRATION RATE: 22 BRPM | HEART RATE: 83 BPM | SYSTOLIC BLOOD PRESSURE: 144 MMHG

## 2021-08-04 DIAGNOSIS — B97.4 RESPIRATORY SYNCYTIAL VIRUS AS THE CAUSE OF DISEASES CLASSIFIED ELSEWHERE: ICD-10-CM

## 2021-08-04 LAB
ALBUMIN SERPL ELPH-MCNC: 3.6 G/DL — SIGNIFICANT CHANGE UP (ref 3.3–5)
ALP SERPL-CCNC: 93 U/L — SIGNIFICANT CHANGE UP (ref 40–120)
ALT FLD-CCNC: 24 U/L — SIGNIFICANT CHANGE UP (ref 10–45)
ANION GAP SERPL CALC-SCNC: 8 MMOL/L — SIGNIFICANT CHANGE UP (ref 5–17)
APPEARANCE UR: CLEAR — SIGNIFICANT CHANGE UP
APTT BLD: 26.7 SEC — LOW (ref 27.5–35.5)
AST SERPL-CCNC: 17 U/L — SIGNIFICANT CHANGE UP (ref 10–40)
BACTERIA # UR AUTO: NEGATIVE — SIGNIFICANT CHANGE UP
BASE EXCESS BLDV CALC-SCNC: 3.7 MMOL/L — HIGH (ref -2–2)
BASOPHILS # BLD AUTO: 0.05 K/UL — SIGNIFICANT CHANGE UP (ref 0–0.2)
BASOPHILS NFR BLD AUTO: 0.5 % — SIGNIFICANT CHANGE UP (ref 0–2)
BILIRUB SERPL-MCNC: 0.4 MG/DL — SIGNIFICANT CHANGE UP (ref 0.2–1.2)
BILIRUB UR-MCNC: NEGATIVE — SIGNIFICANT CHANGE UP
BUN SERPL-MCNC: 10 MG/DL — SIGNIFICANT CHANGE UP (ref 7–23)
CA-I SERPL-SCNC: 1.19 MMOL/L — SIGNIFICANT CHANGE UP (ref 1.12–1.3)
CALCIUM SERPL-MCNC: 9.4 MG/DL — SIGNIFICANT CHANGE UP (ref 8.4–10.5)
CHLORIDE BLDV-SCNC: 106 MMOL/L — SIGNIFICANT CHANGE UP (ref 96–108)
CHLORIDE SERPL-SCNC: 103 MMOL/L — SIGNIFICANT CHANGE UP (ref 96–108)
CO2 BLDV-SCNC: 33 MMOL/L — HIGH (ref 22–30)
CO2 SERPL-SCNC: 25 MMOL/L — SIGNIFICANT CHANGE UP (ref 22–31)
COLOR SPEC: SIGNIFICANT CHANGE UP
CREAT SERPL-MCNC: 0.65 MG/DL — SIGNIFICANT CHANGE UP (ref 0.5–1.3)
DIFF PNL FLD: ABNORMAL
EOSINOPHIL # BLD AUTO: 0.03 K/UL — SIGNIFICANT CHANGE UP (ref 0–0.5)
EOSINOPHIL NFR BLD AUTO: 0.3 % — SIGNIFICANT CHANGE UP (ref 0–6)
EPI CELLS # UR: 0 /HPF — SIGNIFICANT CHANGE UP
FLUAV H1 2009 PAND RNA SPEC QL NAA+PROBE: SIGNIFICANT CHANGE UP
FLUAV H1 RNA SPEC QL NAA+PROBE: SIGNIFICANT CHANGE UP
FLUAV SUBTYP SPEC NAA+PROBE: SIGNIFICANT CHANGE UP
GAS PNL BLDV: 136 MMOL/L — SIGNIFICANT CHANGE UP (ref 135–145)
GAS PNL BLDV: SIGNIFICANT CHANGE UP
GAS PNL BLDV: SIGNIFICANT CHANGE UP
GLUCOSE BLDV-MCNC: 116 MG/DL — HIGH (ref 70–99)
GLUCOSE SERPL-MCNC: 120 MG/DL — HIGH (ref 70–99)
GLUCOSE UR QL: NEGATIVE — SIGNIFICANT CHANGE UP
HADV DNA SPEC QL NAA+PROBE: SIGNIFICANT CHANGE UP
HCO3 BLDV-SCNC: 31 MMOL/L — HIGH (ref 21–29)
HCT VFR BLD CALC: 46.3 % — SIGNIFICANT CHANGE UP (ref 39–50)
HCT VFR BLDA CALC: 49 % — SIGNIFICANT CHANGE UP (ref 39–50)
HGB BLD CALC-MCNC: 15.9 G/DL — SIGNIFICANT CHANGE UP (ref 13–17)
HGB BLD-MCNC: 15.2 G/DL — SIGNIFICANT CHANGE UP (ref 13–17)
HMPV RNA SPEC QL NAA+PROBE: SIGNIFICANT CHANGE UP
HOROWITZ INDEX BLDV+IHG-RTO: SIGNIFICANT CHANGE UP
HYALINE CASTS # UR AUTO: 0 /LPF — SIGNIFICANT CHANGE UP (ref 0–2)
IMM GRANULOCYTES NFR BLD AUTO: 0.8 % — SIGNIFICANT CHANGE UP (ref 0–1.5)
INR BLD: 1.02 RATIO — SIGNIFICANT CHANGE UP (ref 0.88–1.16)
KETONES UR-MCNC: NEGATIVE — SIGNIFICANT CHANGE UP
LACTATE BLDV-MCNC: 1.6 MMOL/L — SIGNIFICANT CHANGE UP (ref 0.7–2)
LEUKOCYTE ESTERASE UR-ACNC: NEGATIVE — SIGNIFICANT CHANGE UP
LYMPHOCYTES # BLD AUTO: 1.07 K/UL — SIGNIFICANT CHANGE UP (ref 1–3.3)
LYMPHOCYTES # BLD AUTO: 10.9 % — LOW (ref 13–44)
MCHC RBC-ENTMCNC: 31.2 PG — SIGNIFICANT CHANGE UP (ref 27–34)
MCHC RBC-ENTMCNC: 32.8 GM/DL — SIGNIFICANT CHANGE UP (ref 32–36)
MCV RBC AUTO: 95.1 FL — SIGNIFICANT CHANGE UP (ref 80–100)
MONOCYTES # BLD AUTO: 0.52 K/UL — SIGNIFICANT CHANGE UP (ref 0–0.9)
MONOCYTES NFR BLD AUTO: 5.3 % — SIGNIFICANT CHANGE UP (ref 2–14)
NEUTROPHILS # BLD AUTO: 8.11 K/UL — HIGH (ref 1.8–7.4)
NEUTROPHILS NFR BLD AUTO: 82.2 % — HIGH (ref 43–77)
NITRITE UR-MCNC: NEGATIVE — SIGNIFICANT CHANGE UP
NRBC # BLD: 0 /100 WBCS — SIGNIFICANT CHANGE UP (ref 0–0)
OTHER CELLS CSF MANUAL: 5 ML/DL — LOW (ref 18–22)
PCO2 BLDV: 61 MMHG — HIGH (ref 35–50)
PH BLDV: 7.33 — LOW (ref 7.35–7.45)
PH UR: 6.5 — SIGNIFICANT CHANGE UP (ref 5–8)
PLATELET # BLD AUTO: 153 K/UL — SIGNIFICANT CHANGE UP (ref 150–400)
PO2 BLDV: 21 MMHG — LOW (ref 25–45)
POTASSIUM BLDV-SCNC: 3.6 MMOL/L — SIGNIFICANT CHANGE UP (ref 3.5–5.3)
POTASSIUM SERPL-MCNC: 3.9 MMOL/L — SIGNIFICANT CHANGE UP (ref 3.5–5.3)
POTASSIUM SERPL-SCNC: 3.9 MMOL/L — SIGNIFICANT CHANGE UP (ref 3.5–5.3)
PROT SERPL-MCNC: 6.5 G/DL — SIGNIFICANT CHANGE UP (ref 6–8.3)
PROT UR-MCNC: NEGATIVE — SIGNIFICANT CHANGE UP
PROTHROM AB SERPL-ACNC: 12.2 SEC — SIGNIFICANT CHANGE UP (ref 10.6–13.6)
RAPID RVP RESULT: DETECTED
RBC # BLD: 4.87 M/UL — SIGNIFICANT CHANGE UP (ref 4.2–5.8)
RBC # FLD: 14.4 % — SIGNIFICANT CHANGE UP (ref 10.3–14.5)
RBC CASTS # UR COMP ASSIST: 6 /HPF — HIGH (ref 0–4)
RSV RNA SPEC QL NAA+PROBE: DETECTED
RV+EV RNA SPEC QL NAA+PROBE: SIGNIFICANT CHANGE UP
SAO2 % BLDV: 24 % — LOW (ref 67–88)
SARS-COV-2 RNA SPEC QL NAA+PROBE: SIGNIFICANT CHANGE UP
SODIUM SERPL-SCNC: 136 MMOL/L — SIGNIFICANT CHANGE UP (ref 135–145)
SP GR SPEC: 1.02 — SIGNIFICANT CHANGE UP (ref 1.01–1.02)
UROBILINOGEN FLD QL: NEGATIVE — SIGNIFICANT CHANGE UP
WBC # BLD: 9.86 K/UL — SIGNIFICANT CHANGE UP (ref 3.8–10.5)
WBC # FLD AUTO: 9.86 K/UL — SIGNIFICANT CHANGE UP (ref 3.8–10.5)
WBC UR QL: 1 /HPF — SIGNIFICANT CHANGE UP (ref 0–5)

## 2021-08-04 PROCEDURE — 93010 ELECTROCARDIOGRAM REPORT: CPT | Mod: GC

## 2021-08-04 PROCEDURE — 99285 EMERGENCY DEPT VISIT HI MDM: CPT | Mod: CS,GC

## 2021-08-04 PROCEDURE — 71045 X-RAY EXAM CHEST 1 VIEW: CPT | Mod: 26

## 2021-08-04 RX ORDER — SODIUM CHLORIDE 9 MG/ML
1000 INJECTION INTRAMUSCULAR; INTRAVENOUS; SUBCUTANEOUS ONCE
Refills: 0 | Status: COMPLETED | OUTPATIENT
Start: 2021-08-04 | End: 2021-08-04

## 2021-08-04 RX ORDER — IPRATROPIUM/ALBUTEROL SULFATE 18-103MCG
3 AEROSOL WITH ADAPTER (GRAM) INHALATION EVERY 6 HOURS
Refills: 0 | Status: DISCONTINUED | OUTPATIENT
Start: 2021-08-04 | End: 2021-08-10

## 2021-08-04 RX ORDER — CEFTRIAXONE 500 MG/1
1000 INJECTION, POWDER, FOR SOLUTION INTRAMUSCULAR; INTRAVENOUS EVERY 24 HOURS
Refills: 0 | Status: DISCONTINUED | OUTPATIENT
Start: 2021-08-05 | End: 2021-08-08

## 2021-08-04 RX ORDER — SODIUM CHLORIDE 9 MG/ML
3 INJECTION INTRAMUSCULAR; INTRAVENOUS; SUBCUTANEOUS EVERY 6 HOURS
Refills: 0 | Status: DISCONTINUED | OUTPATIENT
Start: 2021-08-04 | End: 2021-08-10

## 2021-08-04 RX ORDER — ACETAMINOPHEN 500 MG
650 TABLET ORAL EVERY 4 HOURS
Refills: 0 | Status: DISCONTINUED | OUTPATIENT
Start: 2021-08-04 | End: 2021-08-10

## 2021-08-04 RX ORDER — ACETAMINOPHEN 500 MG
650 TABLET ORAL ONCE
Refills: 0 | Status: COMPLETED | OUTPATIENT
Start: 2021-08-04 | End: 2021-08-04

## 2021-08-04 RX ORDER — CEFTRIAXONE 500 MG/1
INJECTION, POWDER, FOR SOLUTION INTRAMUSCULAR; INTRAVENOUS
Refills: 0 | Status: DISCONTINUED | OUTPATIENT
Start: 2021-08-04 | End: 2021-08-08

## 2021-08-04 RX ORDER — ACETYLCYSTEINE 200 MG/ML
4 VIAL (ML) MISCELLANEOUS THREE TIMES A DAY
Refills: 0 | Status: COMPLETED | OUTPATIENT
Start: 2021-08-04 | End: 2021-08-09

## 2021-08-04 RX ORDER — SODIUM CHLORIDE 9 MG/ML
1000 INJECTION, SOLUTION INTRAVENOUS
Refills: 0 | Status: DISCONTINUED | OUTPATIENT
Start: 2021-08-04 | End: 2021-08-10

## 2021-08-04 RX ORDER — HEPARIN SODIUM 5000 [USP'U]/ML
5000 INJECTION INTRAVENOUS; SUBCUTANEOUS EVERY 12 HOURS
Refills: 0 | Status: DISCONTINUED | OUTPATIENT
Start: 2021-08-04 | End: 2021-08-10

## 2021-08-04 RX ORDER — CEFTRIAXONE 500 MG/1
1000 INJECTION, POWDER, FOR SOLUTION INTRAMUSCULAR; INTRAVENOUS ONCE
Refills: 0 | Status: COMPLETED | OUTPATIENT
Start: 2021-08-04 | End: 2021-08-04

## 2021-08-04 RX ORDER — PIPERACILLIN AND TAZOBACTAM 4; .5 G/20ML; G/20ML
3.38 INJECTION, POWDER, LYOPHILIZED, FOR SOLUTION INTRAVENOUS ONCE
Refills: 0 | Status: COMPLETED | OUTPATIENT
Start: 2021-08-04 | End: 2021-08-04

## 2021-08-04 RX ADMIN — Medication 4 MILLILITER(S): at 14:39

## 2021-08-04 RX ADMIN — Medication 650 MILLIGRAM(S): at 11:24

## 2021-08-04 RX ADMIN — Medication 650 MILLIGRAM(S): at 03:17

## 2021-08-04 RX ADMIN — SODIUM CHLORIDE 1000 MILLILITER(S): 9 INJECTION INTRAMUSCULAR; INTRAVENOUS; SUBCUTANEOUS at 03:17

## 2021-08-04 RX ADMIN — CEFTRIAXONE 100 MILLIGRAM(S): 500 INJECTION, POWDER, FOR SOLUTION INTRAMUSCULAR; INTRAVENOUS at 13:03

## 2021-08-04 RX ADMIN — SODIUM CHLORIDE 3 MILLILITER(S): 9 INJECTION INTRAMUSCULAR; INTRAVENOUS; SUBCUTANEOUS at 23:20

## 2021-08-04 RX ADMIN — Medication 3 MILLILITER(S): at 20:30

## 2021-08-04 RX ADMIN — SODIUM CHLORIDE 60 MILLILITER(S): 9 INJECTION, SOLUTION INTRAVENOUS at 11:16

## 2021-08-04 RX ADMIN — Medication 3 MILLILITER(S): at 23:20

## 2021-08-04 RX ADMIN — PIPERACILLIN AND TAZOBACTAM 200 GRAM(S): 4; .5 INJECTION, POWDER, LYOPHILIZED, FOR SOLUTION INTRAVENOUS at 03:17

## 2021-08-04 RX ADMIN — Medication 3 MILLILITER(S): at 13:03

## 2021-08-04 RX ADMIN — HEPARIN SODIUM 5000 UNIT(S): 5000 INJECTION INTRAVENOUS; SUBCUTANEOUS at 18:00

## 2021-08-04 RX ADMIN — Medication 4 MILLILITER(S): at 23:21

## 2021-08-04 NOTE — H&P ADULT - HISTORY OF PRESENT ILLNESS
CHIEF COMPLAINT:Patient is a 71y old  Male who presents with a chief complaint of sob.    HPI:  72yo M Hx of end stage alzhiemers nonverbal at baseline presenting with complaints of SOB. per EMS pt has been coughing for the past several days with worsening SOB today. no fevers at home. not vaccinated for covid. no n/v.    PAST MEDICAL & SURGICAL HISTORY:  Dementia  htn        MEDICATIONS  (STANDING):  noted    MEDICATIONS  (PRN):      FAMILY HISTORY:      SOCIAL HISTORY:    [ ] Non-smoker  [ ] Smoker  [ ] Alcohol    Allergies    No Known Allergies    Intolerances    	    REVIEW OF SYSTEMS:  CONSTITUTIONAL: No fever, weight loss, or fatigue  EYES: No eye pain, visual disturbances, or discharge  ENT:  No difficulty hearing, tinnitus, vertigo; No sinus or throat pain  NECK: No pain or stiffness  RESPIRATORY: No cough, wheezing, chills or hemoptysis; _ Shortness of Breath  CARDIOVASCULAR: No chest pain, palpitations, passing out, dizziness, or leg swelling  GASTROINTESTINAL: No abdominal or epigastric pain. No nausea, vomiting, or hematemesis; No diarrhea or constipation. No melena or hematochezia.  GENITOURINARY: No dysuria, frequency, hematuria, or incontinence  NEUROLOGICAL: No headaches, memory loss, loss of strength, numbness, or tremors  SKIN: No itching, burning, rashes, or lesions   LYMPH Nodes: No enlarged glands  ENDOCRINE: No heat or cold intolerance; No hair loss  MUSCULOSKELETAL: No joint pain or swelling; No muscle, back, or extremity pain  PSYCHIATRIC: No depression, anxiety, mood swings, or difficulty sleeping  HEME/LYMPH: No easy bruising, or bleeding gums  ALLERGY AND IMMUNOLOGIC: No hives or eczema	    [ ] All others negative	  [x ] Unable to obtain    PHYSICAL EXAM:  T(C): 36.5 (08-04-21 @ 07:07), Max: 38.4 (08-04-21 @ 02:45)  HR: 78 (08-04-21 @ 09:51) (78 - 102)  BP: 169/81 (08-04-21 @ 09:51) (120/91 - 190/68)  RR: 19 (08-04-21 @ 09:51) (19 - 24)  SpO2: 100% (08-04-21 @ 09:51) (95% - 100%)  Wt(kg): --  I&O's Summary      Appearance: Normal	  HEENT:   Normal oral mucosa, PERRL, EOMI	  Lymphatic: No lymphadenopathy  Cardiovascular: Normal S1 S2, No JVD, + murmurs, No edema  Respiratory:rhonchi  Psychiatry: demented  Gastrointestinal:  Soft, Non-tender, + BS	  Skin: No rashes, No ecchymoses, No cyanosis	  Neurologic: Non-focal  Extremities: Normal range of motion, No clubbing, cyanosis or edema  Vascular: Peripheral pulses palpable 2+ bilaterally    TELEMETRY: 	    ECG:  	  RADIOLOGY:  OTHER: 	  	  LABS:	 	    CARDIAC MARKERS:                              15.2   9.86  )-----------( 153      ( 04 Aug 2021 03:20 )             46.3     08-04    136  |  103  |  10  ----------------------------<  120<H>  3.9   |  25  |  0.65    Ca    9.4      04 Aug 2021 03:20    TPro  6.5  /  Alb  3.6  /  TBili  0.4  /  DBili  x   /  AST  17  /  ALT  24  /  AlkPhos  93  08-04    proBNP:   Lipid Profile:   HgA1c:   TSH:   PT/INR - ( 04 Aug 2021 03:20 )   PT: 12.2 sec;   INR: 1.02 ratio         PTT - ( 04 Aug 2021 03:20 )  PTT:26.7 sec    PREVIOUS DIAGNOSTIC TESTING:    < from: Xray Chest 1 View- PORTABLE-Urgent (08.04.21 @ 04:41) >  LUNGS: Poor inspiratory effort. The lungs are clear. Left hemidiaphragm elevation.    PLEURA: No pleural effusion or pneumothorax.    HEART AND MEDIASTINUM: Cardiomediastinal silhouette size is within normal limits.    SKELETON: No acute abnormality.      IMPRESSION:    Clear lungs.    < end of copied text >        
70 yo male PMH advanced Alzhiemers disease nonverbal at baseline presenting with complaints of SOB. Per EMS pt has been coughing for the past several days with worsening SOB today. No reported fevers at home. Was not vaccinated for covid. No n/v.

## 2021-08-04 NOTE — H&P ADULT - NSHPLABSRESULTS_GEN_ALL_CORE
LABS:                        15.2   9.86  )-----------( 153      ( 04 Aug 2021 03:20 )             46.3     08-04    136  |  103  |  10  ----------------------------<  120<H>  3.9   |  25  |  0.65    Ca    9.4      04 Aug 2021 03:20    TPro  6.5  /  Alb  3.6  /  TBili  0.4  /  DBili  x   /  AST  17  /  ALT  24  /  AlkPhos  93  08-04    PT/INR - ( 04 Aug 2021 03:20 )   PT: 12.2 sec;   INR: 1.02 ratio         PTT - ( 04 Aug 2021 03:20 )  PTT:26.7 sec  Urinalysis Basic - ( 04 Aug 2021 03:18 )    Color: Light Yellow / Appearance: Clear / S.023 / pH: x  Gluc: x / Ketone: Negative  / Bili: Negative / Urobili: Negative   Blood: x / Protein: Negative / Nitrite: Negative   Leuk Esterase: Negative / RBC: 6 /hpf / WBC 1 /HPF   Sq Epi: x / Non Sq Epi: 0 /hpf / Bacteria: Negative          RADIOLOGY & ADDITIONAL TESTS:
< from: CT Head No Cont (04.27.20 @ 16:34) >    IMPRESSION: Moderate to marked and atrophy.  < from: CT Abdomen and Pelvis w/ IV Cont (04.24.20 @ 10:00) >    Right inguinal hernia that involves a portion of the bladder fundus. See above.  No evidence of bowel related inflammation nor obstruction.  Moderate bibasilar parenchymal infiltrates compatible with atypical pneumonia including Covid 19.  The pertinent findings were discussed with Dr. Díaz at the conclusion of today's evaluation.    Respiratory Viral Panel with COVID-19 by GLADYS (08.04.21 @ 03:16)    Rapid RVP Result: Detected    SARS-CoV-2: NotDetec: This Respiratory Panel uses polymerase chain reaction (PCR) to detect for  adenovirus; coronavirus (HKU1, NL63, 229E, OC43); human metapneumovirus  (hMPV); human enterovirus/rhinovirus (Entero/RV); influenza A; influenza  A/H1; influenza A/H3; influenza A/H1-2009; influenza B; parainfluenza  viruses 1, 2, 3, 4; respiratory syncytial virus; Mycoplasma pneumoniae;  Chlamydophila pneumoniae; and SARS-CoV-2.    Adenovirus (RapRVP): NotDetec    Influenza A (RapRVP): NotDetec    Influenza AH1 (RapRVP): NotDetec    Influenza AH3 (RapRVP): NotDetec    Resp Syncytial Virus (RapRVP): Detected    Entero/Rhinovirus (RapRVP): NotDetec    hMPV (RapRVP): NotDetec

## 2021-08-04 NOTE — CONSULT NOTE ADULT - ASSESSMENT
ASSESSMENT:    advanced dementia with likely underlying dysphagia admitted with RSV infection complicated by bronchospasm, mucous plugging and hypoxemia - he remains unvaccinated for COVID - CXR is without evidence of pneumonia - the patient has hypercapnia and a mild respiratory acidosis on VBG    PLAN/RECOMMENDATIONS:    oxygen supplementation to keep saturation greater than 92%  head of bed elevation greater than 30 degrees at all times  NPO until mental status improves - consider speech and swallow evaluation   ceftriaxone - await blood and urine cultures  albuterol/atrovent nebs q6h  mucomyst and hypertonic saline nebs to facilitate mucous clearance  diligent chest PT including chest vest  acetaminophen for fever  DVT prophylaxis  IV fluids  ABG    Thank you for the courtesy of this referral. Plan of care discussed with the patient's daughter and his RN at bedside     Ranjan De Guzman MD, [facility]  [phone]  Pulmonary Medicine       ASSESSMENT:    advanced dementia with likely underlying dysphagia admitted with RSV infection complicated by bronchospasm, mucous plugging and hypoxemia - he remains unvaccinated for COVID - CXR is without evidence of pneumonia - the patient has hypercapnia and a mild respiratory acidosis on VBG    PLAN/RECOMMENDATIONS:    oxygen supplementation to keep saturation greater than 92%  head of bed elevation greater than 30 degrees at all times  NPO until mental status improves - consider speech and swallow evaluation   ceftriaxone - await blood and urine cultures  albuterol/atrovent nebs q6h  mucomyst and hypertonic saline nebs to facilitate mucous clearance  diligent chest PT including chest vest  acetaminophen for fever  DVT prophylaxis  IV fluids  ABG    Thank you for the courtesy of this referral. Plan of care discussed with the patient's daughter and his RN at bedside     Ranjan De Guzman MD, [facility]  [phone]  Pulmonary Medicine

## 2021-08-04 NOTE — ED PROVIDER NOTE - ATTENDING CONTRIBUTION TO CARE
Afebrile. Non-verbal, no speech, no eye opening. Lungs CTA. Heart RRR. Abdomen soft NTND. Tremors in hands.    r/o sepsis

## 2021-08-04 NOTE — CONSULT NOTE ADULT - SUBJECTIVE AND OBJECTIVE BOX
HPI:  70yo M Hx of end stage alzhiemers nonverbal at baseline      presenting with complaints of SOB. per EMS pt has been coughing for the past several days with worsening SOB today.    no fevers at home. not vaccinated for covid. no n/v.    PAST MEDICAL & SURGICAL HISTORY:  Dementia  htn        MEDICATIONS  (STANDING):  noted    MEDICATIONS  (PRN):      FAMILY HISTORY:      SOCIAL HISTORY:    [ ] Non-smoker  [ ] Smoker  [ ] Alcohol    Allergies    No Known Allergies    Intolerances    	    REVIEW OF SYSTEMS:  CONSTITUTIONAL: No fever, weight loss, or fatigue  EYES: No eye pain, visual disturbances, or discharge  ENT:  No difficulty hearing, tinnitus, vertigo; No sinus or throat pain  NECK: No pain or stiffness  RESPIRATORY: No cough, wheezing, chills or hemoptysis; _ Shortness of Breath  CARDIOVASCULAR: No chest pain, palpitations, passing out, dizziness, or leg swelling  GASTROINTESTINAL: No abdominal or epigastric pain. No nausea, vomiting, or hematemesis; No diarrhea or constipation. No melena or hematochezia.  GENITOURINARY: No dysuria, frequency, hematuria, or incontinence  NEUROLOGICAL: No headaches, memory loss, loss of strength, numbness, or tremors  SKIN: No itching, burning, rashes, or lesions   LYMPH Nodes: No enlarged glands  ENDOCRINE: No heat or cold intolerance; No hair loss  MUSCULOSKELETAL: No joint pain or swelling; No muscle, back, or extremity pain  PSYCHIATRIC: No depression, anxiety, mood swings, or difficulty sleeping  HEME/LYMPH: No easy bruising, or bleeding gums  ALLERGY AND IMMUNOLOGIC: No hives or eczema	    [ ] All others negative	  [x ] Unable to obtain    PHYSICAL EXAM:  T(C): 36.5 (21 @ 07:07), Max: 38.4 (21 @ 02:45)  HR: 78 (21 @ 09:51) (78 - 102)  BP: 169/81 (21 @ 09:51) (120/91 - 190/68)  RR: 19 (21 @ 09:51) (19 - 24)  SpO2: 100% (21 @ 09:51) (95% - 100%)  Wt(kg): --  I&O's Summary      Appearance: Normal	  HEENT:   Normal oral mucosa, PERRL, EOMI	  Lymphatic: No lymphadenopathy  Cardiovascular: Normal S1 S2, No JVD, + murmurs, No edema  Respiratory:rhonchi  Psychiatry: demented  Gastrointestinal:  Soft, Non-tender, + BS	  Skin: No rashes, No ecchymoses, No cyanosis	  Neurologic: Non-focal  Extremities: Normal range of motion, No clubbing, cyanosis or edema  Vascular: Peripheral pulses palpable 2+ bilaterally    TELEMETRY: 	    ECG:  	  RADIOLOGY:  OTHER: 	  	  LABS:	 	    CARDIAC MARKERS:                              15.2   9.86  )-----------( 153      ( 04 Aug 2021 03:20 )             46.3         136  |  103  |  10  ----------------------------<  120<H>  3.9   |  25  |  0.65    Ca    9.4      04 Aug 2021 03:20    TPro  6.5  /  Alb  3.6  /  TBili  0.4  /  DBili  x   /  AST  17  /  ALT  24  /  AlkPhos  93  -    proBNP:   Lipid Profile:   HgA1c:   TSH:   PT/INR - ( 04 Aug 2021 03:20 )   PT: 12.2 sec;   INR: 1.02 ratio         PTT - ( 04 Aug 2021 03:20 )  PTT:26.7 sec    PREVIOUS DIAGNOSTIC TESTING:    < from: Xray Chest 1 View- PORTABLE-Urgent (21 @ 04:41) >  LUNGS: Poor inspiratory effort. The lungs are clear. Left hemidiaphragm elevation.    PLEURA: No pleural effusion or pneumothorax.    HEART AND MEDIASTINUM: Cardiomediastinal silhouette size is within normal limits.    SKELETON: No acute abnormality.      IMPRESSION:    Clear lungs.    < end of copied text >         (04 Aug 2021 10:52)      REVIEW OF SYSTEMS:  GEN: no fever,    no chills  RESP: no SOB,   no cough  CVS: no chest pain,   no palpitations  GI: no abdominal pain,   no nausea,   no vomiting,   no constipation,   no diarrhea  : no dysuria,   no frequency  NEURO: no headache,   no dizziness  PSYCH: no depression,   not anxious  Derm : no rash    Allergies    No Known Allergies    Intolerances        CAPILLARY BLOOD GLUCOSE        I&O's Summary      Vital Signs Last 24 Hrs  T(C): 36.9 (04 Aug 2021 16:27), Max: 38.4 (04 Aug 2021 02:45)  T(F): 98.5 (04 Aug 2021 16:27), Max: 101.2 (04 Aug 2021 02:45)  HR: 90 (04 Aug 2021 16:27) (78 - 102)  BP: 145/81 (04 Aug 2021 16:27) (120/91 - 190/68)  BP(mean): --  RR: 19 (04 Aug 2021 16:27) (19 - 24)  SpO2: 100% (04 Aug 2021 16:27) (95% - 100%)  PHYSICAL EXAM:  GENERAL: NAD,   HEAD:  Atraumatic, Normocephalic  EYES: EOMI,  NECK: Supple, No JVD  CHEST/LUNG: Clear to auscultation bilaterally; No wheeze  HEART: Regular rate and rhythm;        murmur  ABDOMEN: Soft, Nontender,   EXTREMITIES:     no    edema  NEUROLOGY:   dementia    MEDICATIONS  (STANDING):  acetylcysteine 20%  Inhalation 4 milliLiter(s) Inhalation three times a day  albuterol/ipratropium for Nebulization 3 milliLiter(s) Nebulizer every 6 hours  cefTRIAXone   IVPB      heparin   Injectable 5000 Unit(s) SubCutaneous every 12 hours  lactated ringers. 1000 milliLiter(s) (60 mL/Hr) IV Continuous <Continuous>  sodium chloride 3%  Inhalation 3 milliLiter(s) Inhalation every 6 hours    MEDICATIONS  (PRN):  acetaminophen  Suppository .. 650 milliGRAM(s) Rectal every 4 hours PRN Temp greater or equal to 38C (100.4F)    LABS:                        15.2   9.86  )-----------( 153      ( 04 Aug 2021 03:20 )             46.3     08-04    136  |  103  |  10  ----------------------------<  120<H>  3.9   |  25  |  0.65    Ca    9.4      04 Aug 2021 03:20    TPro  6.5  /  Alb  3.6  /  TBili  0.4  /  DBili  x   /  AST  17  /  ALT  24  /  AlkPhos  93  08-04    PT/INR - ( 04 Aug 2021 03:20 )   PT: 12.2 sec;   INR: 1.02 ratio         PTT - ( 04 Aug 2021 03:20 )  PTT:26.7 sec      Urinalysis Basic - ( 04 Aug 2021 03:18 )    Color: Light Yellow / Appearance: Clear / S.023 / pH: x  Gluc: x / Ketone: Negative  / Bili: Negative / Urobili: Negative   Blood: x / Protein: Negative / Nitrite: Negative   Leuk Esterase: Negative / RBC: 6 /hpf / WBC 1 /HPF   Sq Epi: x / Non Sq Epi: 0 /hpf / Bacteria: Negative           @ 03:20  3.6  21              Consultant(s) Notes Reviewed:      Care Discussed with Consultants/Other Providers:  Plan:

## 2021-08-04 NOTE — ED ADULT NURSE NOTE - OBJECTIVE STATEMENT
Patient is a 71 yr old male with a PMH of early onset Alzheimer's. Per patients grandson at bedside, patient is non verbal at baseline and has been for years. He states patient for the past few weeks has been more congested and producing a lot of mucous. Patient does understand Swedish at baseline and family feeds patient as he can't care for himself. Upon assessment, patient is AOx0, non verbal, tremors at baseline per grandson, rhonchi lung sounds satting 90% on RA, placed on 2 L NC satting 99%, abdomen soft, +pulses, sacral redness, blanchable redness, and shows no signs of pain/discomfort. Provider assessed at bedside, heplcok from EMS patent/intact, labs drawn, straight cath using all sterile technique, UA/UC drawn, meds ordered given, all safety precautions maintained and will continue to monitor. Patient changed and cleaned with new diaper/padding.

## 2021-08-04 NOTE — H&P ADULT - ASSESSMENT
72yo M Hx of end stage alzhiemers nonverbal at baseline presenting with complaints of SOB. per EMS pt has been coughing for the past several days with worsening SOB today. no fevers at home. not vaccinated for covid. no n/v.  pt with sob ?sec to aspiration pneumoniae /rvp/ rsv  add abx ? awaiting pulmonary  pulmonary eval called  dvt prophylaxis  echo  npo high risk of aspiration  check ABG  add iv bp meds as needed  gentle hydration

## 2021-08-04 NOTE — CONSULT NOTE ADULT - ASSESSMENT
72 yo M     hx dementia nonverbal at baseline,  has trouble  ambulating  at baseline, needs help, per  family   . Per   daughter: pt   had   short of breath, productive coughing  with  lethargy.     pt's daughter Sade Roca is HCP    per  family, upon discussing  goc,  IN [facility]  [facility],  they  stated, they  '  will  not  confirm  or decline,  at  this  time '  seen by  PCU dr Hubbard/ pt remains  full code , at  this  time  ct  noted., right  inguinal hernia  with bladder involvement,  per  urology,   no  intervention needed  as  hernia  is  c/c , and    stable, defer  elective  surgery , when needed  , if  at  all  Pysch  eval, noted,  pt with prior  h/o  dementia   and  agitation  on haldol prn , for    delirium/  agitation     ct  head,  marked  atrophy   elevated     inflammatory markers. from  probable  covid , in  the past     admitted  with worsening ams/  ftt    pt at baseline is  non verbal    given pt's mental staus, is at high risk for  aspiration   npo/  iv  fluids.  swallow  eval    mild tachypnea,  seen by pulm geneva linda   on dvt ppx  pt  is not  DNR, per  family  on iv rocephin      pts  dr  is dr ha ambrocio    from: Xray Chest 1 View- PORTABLE-Urgent (04.24.20 @ 07:48) >  IMPRESSION: Diffuse bilateral patchy opacities likely representing atypical/viral pneumonia. COVID 19 is considered.  < end of copied text >    < from: CT Abdomen and Pelvis w/ IV Cont (04.24.20 @ 10:00) >  IMPRESSION:   Right inguinal hernia that involves a portion of the bladder fundus. See above.  No evidence of bowel related inflammation nor obstruction.  Moderate bibasilar parenchymal infiltrates compatible with atypical pneumonia including Covid 19.  The pertinent findings were discussed with Dr. Díaz at the conclusion of today's evaluation  < end of copied text >

## 2021-08-04 NOTE — ED PROVIDER NOTE - OBJECTIVE STATEMENT
70yo M Hx of 70yo M Hx of end stage alzhiemers nonverbal at baseline presenting with complaints of SOB. per EMS pt has been coughing for the past several days with worsening SOB today. no fevers at home. not vaccinated for covid. no n/v.

## 2021-08-04 NOTE — ED PROVIDER NOTE - CLINICAL SUMMARY MEDICAL DECISION MAKING FREE TEXT BOX
72yo M with end stage dementia presenting for cough and SOB for several days. febrile in the ED, hypoxic to 90% on RA. concern for aspiration PNA, covid infection, URI. will obtain sepsis bundle, cxr,  IVF and admit.

## 2021-08-04 NOTE — CONSULT NOTE ADULT - SUBJECTIVE AND OBJECTIVE BOX
[facility] - [facility] - CONSULT NOTE    HPI: 71 year old gentleman, lifelong non-smoker, with no history of intrinsic lung disease. He has a history of advanced dementia and requires full assistance from his family with ADLs. He is out of bed and into a wheelchair daily. He eats chopped up regular food but frequently coughs when fed. Over the last several days, the patient has become more lethargic than usual. He has a worsening cough with copious sputum production which is getting caught in the back of his throat. He has chest congestion and wheeze. He had fevers and chills 2 days ago. He has no chest pain/pressure or palpitations. The patient had COVID infection last year requiring a 10 day hospitalization. He remains unvaccinated. The patient has been febrile in the ER. Viral swab -> RSV. Asked to evaluate.    PMHX:  Dementia  HTN    PSHX:  No significant past surgical history    FAMILY HISTORY:  no significant past medical history in first degree relatives    SOCIAL HISTORY:  lifelong non-smoker;  - lives with his wife and other family members    Pulmonary Medications:       Antimicrobials:      Cardiology:      Other:  heparin   Injectable 5000 Unit(s) SubCutaneous every 12 hours  lactated ringers. 1000 milliLiter(s) IV Continuous <Continuous>      Prn:  MEDICATIONS  (PRN):      Allergies    No Known Allergies    HOME MEDICATIONS: see  H & P    REVIEW OF SYSTEMS:  Constitutional: As per HPI  HEENT: Within normal limits  CV: As per HPI  Resp: As per HPI  GI: Within normal limits   : Within normal limits  Musculoskeletal: Within normal limits  Skin: Within normal limits  Neurological: dementia  Psychiatric: Within normal limits  Endocrine: Within normal limits  Hematologic/Lymphatic: Within normal limits  Allergic/Immunologic: Within normal limits    [x] All other systems negative    OBJECTIVE:    Daily Height in cm: 182.88 (04 Aug 2021 02:10)      PHYSICAL EXAM:  ICU Vital Signs Last 24 Hrs  T(C): 37.4 (04 Aug 2021 11:36), Max: 38.4 (04 Aug 2021 02:45)  T(F): 99.3 (04 Aug 2021 11:36), Max: 101.2 (04 Aug 2021 02:45)  HR: 84 (04 Aug 2021 11:36) (78 - 102)  BP: 154/89 (04 Aug 2021 11:36) (120/91 - 190/68)  BP(mean): --  ABP: --  ABP(mean): --  RR: 19 (04 Aug 2021 11:36) (19 - 24)  SpO2: 98% (04 Aug 2021 11:36) (95% - 100%) on 2lpm nasal canula    General: Lethargic. Not cooperative. No distress. Appears stated age 	  HEENT: Atraumatic. Normocephalic. Anicteric. Normal oral mucosa. PERRL. EOMI.  Neck: Supple. Trachea midline. Thyroid without enlargement/tenderness/nodules. No carotid bruit. No JVD.	  Cardiovascular: Regular rate and rhythm. S1 S2 normal. II/VI systolic murmur  Respiratory: Respirations unlabored. Diffuse rhonchi and wheeze. No curvature.  Abdomen: Soft. Non-tender. Non-distended. No organomegaly. No masses. Normal bowel sounds.  Extremities: Warm to touch. No clubbing or cyanosis. No pedal edema. Heel pads  Pulses: 2+ peripheral pulses all extremities.	  Skin: Normal skin color. No rashes or lesions. No ecchymoses. No cyanosis. Warm to touch.  Lymph Nodes: Cervical, supraclavicular and axillary nodes normal  Neurological: Lethargic. Moving all extremities. Intermittent twitching movements. A and O x 0  Psychiatry: Calm      LABS:                          15.2   9.86  )-----------( 153      ( 04 Aug 2021 03:20 )             46.3     CBC    WBC  9.86 <==    Hemoglobin  15.2 <<==    Hematocrit  46.3 <==    Platelets  153 <==      136  |  103  |  10  ----------------------------<  120<H>    08-04  3.9   |  25  |  0.65    LYTES    sodium  136 <==    potassium   3.9 <==    chloride  103 <==    carbon dioxide  25 <==    =============================================================================================  RENAL FUNCTION:    Creatinine:   0.65  <<==    BUN:   10 <==    ============================================================================================    calcium   9.4 <==    ============================================================================================  LFTs    AST:   17 <==     ALT:  24  <==     AP:  93  <=    Bili:  0.4  <=    PT/INR - ( 04 Aug 2021 03:20 )   PT: 12.2 sec;   INR: 1.02 ratio       PTT - ( 04 Aug 2021 03:20 )  PTT:26.7 sec    Venous Blood Gas:   @ 03:20  7.33/61/21/31/24  VBG Lactate: 1.6    MICROBIOLOGY:     Respiratory Viral Panel with COVID-19 by [facility] (21 @ 03:16)   Rapid RVP Result: Detected   SARS-CoV-2: NotDetec  Resp Syncytial Virus (RapRVP): Detected     Urinalysis Basic - ( 04 Aug 2021 03:18 )    Color: Light Yellow / Appearance: Clear / S.023 / pH: x  Gluc: x / Ketone: Negative  / Bili: Negative / Urobili: Negative   Blood: x / Protein: Negative / Nitrite: Negative   Leuk Esterase: Negative / RBC: 6 /hpf / WBC 1 /HPF   Sq Epi: x / Non Sq Epi: 0 /hpf / Bacteria: Negative    RADIOLOGY:  [x ] Chest radiographs reviewed and interpreted by me    < from: Xray Chest 1 View- PORTABLE-Urgent (21 @ 04:41) >    EXAM:  XR CHEST PORTABLE URGENT 1V                          PROCEDURE DATE:  2021        INTERPRETATION:  CLINICAL INFORMATION: Sepsis.    TECHNIQUE: Frontal radiograph of the chest.    COMPARISON: Chest x-ray 2020    FINDINGS:    LUNGS: Poor inspiratory effort. The lungs are clear. Left hemidiaphragm elevation.    PLEURA: No pleural effusion or pneumothorax.    HEART AND MEDIASTINUM: Cardiomediastinal silhouette size is within normal limits.    SKELETON: No acute abnormality.      IMPRESSION:    Clear lungs.    --- End of Report ---    CAROLINA MONTES MD; Resident Radiology  This document has been electronically signed.  KACI DUMONT MD; Attending Radiologist  This document has been electronically signed. Aug  4 2021  6:50AM    < end of copied text >  ---------------------------------------------------------------------------------------------------------------

## 2021-08-04 NOTE — ED ADULT NURSE REASSESSMENT NOTE - NS ED NURSE REASSESS COMMENT FT1
Patient resting comfortable in bed and no longer seems restless. Patient is no longer febrile and positive for RSV. Patient dispo tba. grandson remains at bedside. Patient changed and cleaned.
pt changed, repositioned. daughter at the bedside.
report received from Mariana ARRIAGA. Pt in nad, pt sleeping. grandson at the bedside. Will reassess.
pt sleeping, vss. will continue to reassess.

## 2021-08-05 LAB
ALBUMIN SERPL ELPH-MCNC: 2.9 G/DL — LOW (ref 3.3–5)
ALP SERPL-CCNC: 70 U/L — SIGNIFICANT CHANGE UP (ref 40–120)
ALT FLD-CCNC: 15 U/L — SIGNIFICANT CHANGE UP (ref 10–45)
ANION GAP SERPL CALC-SCNC: 14 MMOL/L — SIGNIFICANT CHANGE UP (ref 5–17)
AST SERPL-CCNC: 13 U/L — SIGNIFICANT CHANGE UP (ref 10–40)
BASE EXCESS BLDA CALC-SCNC: 3.5 MMOL/L — HIGH (ref -2–2)
BILIRUB SERPL-MCNC: 0.6 MG/DL — SIGNIFICANT CHANGE UP (ref 0.2–1.2)
BUN SERPL-MCNC: 6 MG/DL — LOW (ref 7–23)
CALCIUM SERPL-MCNC: 8.8 MG/DL — SIGNIFICANT CHANGE UP (ref 8.4–10.5)
CHLORIDE SERPL-SCNC: 100 MMOL/L — SIGNIFICANT CHANGE UP (ref 96–108)
CO2 BLDA-SCNC: 28 MMOL/L — SIGNIFICANT CHANGE UP (ref 22–30)
CO2 SERPL-SCNC: 22 MMOL/L — SIGNIFICANT CHANGE UP (ref 22–31)
COVID-19 SPIKE DOMAIN AB INTERP: POSITIVE
COVID-19 SPIKE DOMAIN ANTIBODY RESULT: >250 U/ML — HIGH
CREAT SERPL-MCNC: 0.5 MG/DL — SIGNIFICANT CHANGE UP (ref 0.5–1.3)
CULTURE RESULTS: NO GROWTH — SIGNIFICANT CHANGE UP
GAS PNL BLDA: SIGNIFICANT CHANGE UP
GLUCOSE SERPL-MCNC: 109 MG/DL — HIGH (ref 70–99)
HCO3 BLDA-SCNC: 27 MMOL/L — SIGNIFICANT CHANGE UP (ref 21–29)
HCT VFR BLD CALC: 39 % — SIGNIFICANT CHANGE UP (ref 39–50)
HGB BLD-MCNC: 13 G/DL — SIGNIFICANT CHANGE UP (ref 13–17)
HOROWITZ INDEX BLDA+IHG-RTO: SIGNIFICANT CHANGE UP
MCHC RBC-ENTMCNC: 31.3 PG — SIGNIFICANT CHANGE UP (ref 27–34)
MCHC RBC-ENTMCNC: 33.3 GM/DL — SIGNIFICANT CHANGE UP (ref 32–36)
MCV RBC AUTO: 93.8 FL — SIGNIFICANT CHANGE UP (ref 80–100)
NRBC # BLD: 0 /100 WBCS — SIGNIFICANT CHANGE UP (ref 0–0)
PCO2 BLDA: 38 MMHG — SIGNIFICANT CHANGE UP (ref 32–46)
PH BLDA: 7.46 — HIGH (ref 7.35–7.45)
PLATELET # BLD AUTO: 122 K/UL — LOW (ref 150–400)
PO2 BLDA: 58 MMHG — LOW (ref 74–108)
POTASSIUM SERPL-MCNC: 3.6 MMOL/L — SIGNIFICANT CHANGE UP (ref 3.5–5.3)
POTASSIUM SERPL-SCNC: 3.6 MMOL/L — SIGNIFICANT CHANGE UP (ref 3.5–5.3)
PROT SERPL-MCNC: 5.3 G/DL — LOW (ref 6–8.3)
RBC # BLD: 4.16 M/UL — LOW (ref 4.2–5.8)
RBC # FLD: 14.2 % — SIGNIFICANT CHANGE UP (ref 10.3–14.5)
SAO2 % BLDA: 91 % — LOW (ref 92–96)
SARS-COV-2 IGG+IGM SERPL QL IA: >250 U/ML — HIGH
SARS-COV-2 IGG+IGM SERPL QL IA: POSITIVE
SODIUM SERPL-SCNC: 136 MMOL/L — SIGNIFICANT CHANGE UP (ref 135–145)
SPECIMEN SOURCE: SIGNIFICANT CHANGE UP
WBC # BLD: 8.72 K/UL — SIGNIFICANT CHANGE UP (ref 3.8–10.5)
WBC # FLD AUTO: 8.72 K/UL — SIGNIFICANT CHANGE UP (ref 3.8–10.5)

## 2021-08-05 RX ADMIN — Medication 3 MILLILITER(S): at 05:13

## 2021-08-05 RX ADMIN — Medication 650 MILLIGRAM(S): at 16:27

## 2021-08-05 RX ADMIN — SODIUM CHLORIDE 3 MILLILITER(S): 9 INJECTION INTRAMUSCULAR; INTRAVENOUS; SUBCUTANEOUS at 17:23

## 2021-08-05 RX ADMIN — Medication 4 MILLILITER(S): at 05:13

## 2021-08-05 RX ADMIN — SODIUM CHLORIDE 3 MILLILITER(S): 9 INJECTION INTRAMUSCULAR; INTRAVENOUS; SUBCUTANEOUS at 22:51

## 2021-08-05 RX ADMIN — Medication 650 MILLIGRAM(S): at 17:00

## 2021-08-05 RX ADMIN — SODIUM CHLORIDE 3 MILLILITER(S): 9 INJECTION INTRAMUSCULAR; INTRAVENOUS; SUBCUTANEOUS at 11:38

## 2021-08-05 RX ADMIN — Medication 3 MILLILITER(S): at 17:24

## 2021-08-05 RX ADMIN — HEPARIN SODIUM 5000 UNIT(S): 5000 INJECTION INTRAVENOUS; SUBCUTANEOUS at 17:24

## 2021-08-05 RX ADMIN — Medication 3 MILLILITER(S): at 11:38

## 2021-08-05 RX ADMIN — Medication 3 MILLILITER(S): at 22:51

## 2021-08-05 RX ADMIN — CEFTRIAXONE 100 MILLIGRAM(S): 500 INJECTION, POWDER, FOR SOLUTION INTRAMUSCULAR; INTRAVENOUS at 11:38

## 2021-08-05 RX ADMIN — HEPARIN SODIUM 5000 UNIT(S): 5000 INJECTION INTRAVENOUS; SUBCUTANEOUS at 05:12

## 2021-08-05 RX ADMIN — SODIUM CHLORIDE 3 MILLILITER(S): 9 INJECTION INTRAMUSCULAR; INTRAVENOUS; SUBCUTANEOUS at 05:13

## 2021-08-05 RX ADMIN — Medication 4 MILLILITER(S): at 22:51

## 2021-08-05 RX ADMIN — Medication 4 MILLILITER(S): at 13:02

## 2021-08-05 NOTE — PROGRESS NOTE ADULT - ASSESSMENT
72 yo M     hx dementia nonverbal at baseline,  has trouble  ambulating  at baseline, needs help, per  family   . Per   daughter: pt   had   short of breath, productive coughing  with  lethargy.     pt's daughter Sade Roca is HCP  seen by    dr Hubbard/, in the past, pt remains  full code , at  this  time  ct ., right  inguinal hernia  with bladder involvement,  per  urology,   no  intervention needed  Pysch  eval, noted,  pt with prior  h/o  dementia   and  agitation    was on haldol prn , for    delirium/  agitation     ct  head,  marked  atrophy/  elevated     inflammatory markers. from  probable  covid , in  the past      now, admitted  with worsening ams/  ftt    pt at baseline is  non verbal    given pt's mental staus, is at high risk for  aspiration   npo/  iv  fluids.  swallow  eval    mild tachypnea,  seen by pulm [facility] linda   on dvt ppx  pt  is not  DNR  on iv rocephin.  follow blood/urine   c/s   swallow  eval. on iv fluids      pts  dr  is dr ha ambrocio    from: Xray Chest 1 View- PORTABLE-Urgent (04.24.20 @ 07:48) >  IMPRESSION: Diffuse bilateral patchy opacities likely representing atypical/viral pneumonia. COVID 19 is considered.  < end of copied text >    < from: CT Abdomen and Pelvis w/ IV Cont (04.24.20 @ 10:00) >  IMPRESSION:   Right inguinal hernia that involves a portion of the bladder fundus. See above.  No evidence of bowel related inflammation nor obstruction.  Moderate bibasilar parenchymal infiltrates compatible with atypical pneumonia including Covid 19.  The pertinent findings were discussed with Dr. Díaz at the conclusion of today's evaluation  < end of copied text >

## 2021-08-05 NOTE — PROGRESS NOTE ADULT - ASSESSMENT
ASSESSMENT:    advanced dementia with likely underlying dysphagia admitted with RSV infection complicated by bronchospasm, mucous plugging and hypoxemia - he remains unvaccinated for COVID - CXR is without evidence of pneumonia - ABG reveals resolution of the respiratory acidosis and borderline oxygenation    PLAN/RECOMMENDATIONS:    oxygen supplementation to keep saturation greater than 92%  head of bed elevation greater than 30 degrees at all times  NPO until mental status improves - consider speech and swallow evaluation   ceftriaxone - blood and urine cultures are negative  albuterol/atrovent nebs q6h  mucomyst and hypertonic saline nebs to facilitate mucous clearance  diligent chest PT including chest vest  oral hygiene and suctioning  acetaminophen for fever  DVT prophylaxis  IV fluids    Will follow with you. Plan of care discussed with the patient's daughter at bedside. She was instructed in the use of the mouth suction instrument    Ranjan De Guzman MD, [facility]  [phone]  Pulmonary Medicine

## 2021-08-06 LAB
ALBUMIN SERPL ELPH-MCNC: 2.9 G/DL — LOW (ref 3.3–5)
ALP SERPL-CCNC: 73 U/L — SIGNIFICANT CHANGE UP (ref 40–120)
ALT FLD-CCNC: 15 U/L — SIGNIFICANT CHANGE UP (ref 10–45)
ANION GAP SERPL CALC-SCNC: 14 MMOL/L — SIGNIFICANT CHANGE UP (ref 5–17)
AST SERPL-CCNC: 16 U/L — SIGNIFICANT CHANGE UP (ref 10–40)
BASOPHILS # BLD AUTO: 0.17 K/UL — SIGNIFICANT CHANGE UP (ref 0–0.2)
BASOPHILS NFR BLD AUTO: 2.6 % — HIGH (ref 0–2)
BILIRUB SERPL-MCNC: 0.6 MG/DL — SIGNIFICANT CHANGE UP (ref 0.2–1.2)
BUN SERPL-MCNC: 8 MG/DL — SIGNIFICANT CHANGE UP (ref 7–23)
CALCIUM SERPL-MCNC: 8.8 MG/DL — SIGNIFICANT CHANGE UP (ref 8.4–10.5)
CHLORIDE SERPL-SCNC: 97 MMOL/L — SIGNIFICANT CHANGE UP (ref 96–108)
CO2 SERPL-SCNC: 22 MMOL/L — SIGNIFICANT CHANGE UP (ref 22–31)
CREAT SERPL-MCNC: 0.61 MG/DL — SIGNIFICANT CHANGE UP (ref 0.5–1.3)
EOSINOPHIL # BLD AUTO: 0 K/UL — SIGNIFICANT CHANGE UP (ref 0–0.5)
EOSINOPHIL NFR BLD AUTO: 0 % — SIGNIFICANT CHANGE UP (ref 0–6)
GLUCOSE SERPL-MCNC: 88 MG/DL — SIGNIFICANT CHANGE UP (ref 70–99)
HCT VFR BLD CALC: 42.1 % — SIGNIFICANT CHANGE UP (ref 39–50)
HGB BLD-MCNC: 13.5 G/DL — SIGNIFICANT CHANGE UP (ref 13–17)
LYMPHOCYTES # BLD AUTO: 0.34 K/UL — LOW (ref 1–3.3)
LYMPHOCYTES # BLD AUTO: 5.3 % — LOW (ref 13–44)
MANUAL SMEAR VERIFICATION: SIGNIFICANT CHANGE UP
MCHC RBC-ENTMCNC: 31.1 PG — SIGNIFICANT CHANGE UP (ref 27–34)
MCHC RBC-ENTMCNC: 32.1 GM/DL — SIGNIFICANT CHANGE UP (ref 32–36)
MCV RBC AUTO: 97 FL — SIGNIFICANT CHANGE UP (ref 80–100)
METAMYELOCYTES # FLD: 0.9 % — HIGH (ref 0–0)
MONOCYTES # BLD AUTO: 0.11 K/UL — SIGNIFICANT CHANGE UP (ref 0–0.9)
MONOCYTES NFR BLD AUTO: 1.7 % — LOW (ref 2–14)
NEUTROPHILS # BLD AUTO: 5.72 K/UL — SIGNIFICANT CHANGE UP (ref 1.8–7.4)
NEUTROPHILS NFR BLD AUTO: 84.2 % — HIGH (ref 43–77)
NEUTS BAND # BLD: 4.4 % — SIGNIFICANT CHANGE UP (ref 0–8)
PLAT MORPH BLD: ABNORMAL
PLATELET # BLD AUTO: 127 K/UL — LOW (ref 150–400)
POLYCHROMASIA BLD QL SMEAR: SLIGHT — SIGNIFICANT CHANGE UP
POTASSIUM SERPL-MCNC: 3.4 MMOL/L — LOW (ref 3.5–5.3)
POTASSIUM SERPL-SCNC: 3.4 MMOL/L — LOW (ref 3.5–5.3)
PROT SERPL-MCNC: 5.6 G/DL — LOW (ref 6–8.3)
RBC # BLD: 4.34 M/UL — SIGNIFICANT CHANGE UP (ref 4.2–5.8)
RBC # FLD: 14.3 % — SIGNIFICANT CHANGE UP (ref 10.3–14.5)
RBC BLD AUTO: SIGNIFICANT CHANGE UP
SODIUM SERPL-SCNC: 133 MMOL/L — LOW (ref 135–145)
VARIANT LYMPHS # BLD: 0.9 % — SIGNIFICANT CHANGE UP (ref 0–6)
WBC # BLD: 6.46 K/UL — SIGNIFICANT CHANGE UP (ref 3.8–10.5)
WBC # FLD AUTO: 6.46 K/UL — SIGNIFICANT CHANGE UP (ref 3.8–10.5)

## 2021-08-06 RX ORDER — ACETAMINOPHEN 500 MG
1000 TABLET ORAL ONCE
Refills: 0 | Status: COMPLETED | OUTPATIENT
Start: 2021-08-06 | End: 2021-08-06

## 2021-08-06 RX ORDER — POTASSIUM CHLORIDE 20 MEQ
10 PACKET (EA) ORAL
Refills: 0 | Status: COMPLETED | OUTPATIENT
Start: 2021-08-06 | End: 2021-08-06

## 2021-08-06 RX ORDER — POTASSIUM CHLORIDE 20 MEQ
20 PACKET (EA) ORAL ONCE
Refills: 0 | Status: DISCONTINUED | OUTPATIENT
Start: 2021-08-06 | End: 2021-08-06

## 2021-08-06 RX ORDER — CARBAMIDE PEROXIDE 81.86 MG/ML
5 SOLUTION/ DROPS AURICULAR (OTIC)
Refills: 0 | Status: DISCONTINUED | OUTPATIENT
Start: 2021-08-06 | End: 2021-08-10

## 2021-08-06 RX ADMIN — SODIUM CHLORIDE 3 MILLILITER(S): 9 INJECTION INTRAMUSCULAR; INTRAVENOUS; SUBCUTANEOUS at 05:02

## 2021-08-06 RX ADMIN — Medication 3 MILLILITER(S): at 05:01

## 2021-08-06 RX ADMIN — Medication 4 MILLILITER(S): at 23:30

## 2021-08-06 RX ADMIN — Medication 3 MILLILITER(S): at 11:45

## 2021-08-06 RX ADMIN — Medication 650 MILLIGRAM(S): at 05:09

## 2021-08-06 RX ADMIN — HEPARIN SODIUM 5000 UNIT(S): 5000 INJECTION INTRAVENOUS; SUBCUTANEOUS at 05:02

## 2021-08-06 RX ADMIN — Medication 3 MILLILITER(S): at 23:30

## 2021-08-06 RX ADMIN — SODIUM CHLORIDE 3 MILLILITER(S): 9 INJECTION INTRAMUSCULAR; INTRAVENOUS; SUBCUTANEOUS at 17:29

## 2021-08-06 RX ADMIN — CEFTRIAXONE 100 MILLIGRAM(S): 500 INJECTION, POWDER, FOR SOLUTION INTRAMUSCULAR; INTRAVENOUS at 11:45

## 2021-08-06 RX ADMIN — Medication 4 MILLILITER(S): at 13:06

## 2021-08-06 RX ADMIN — Medication 4 MILLILITER(S): at 05:01

## 2021-08-06 RX ADMIN — SODIUM CHLORIDE 60 MILLILITER(S): 9 INJECTION, SOLUTION INTRAVENOUS at 05:39

## 2021-08-06 RX ADMIN — Medication 650 MILLIGRAM(S): at 18:35

## 2021-08-06 RX ADMIN — Medication 3 MILLILITER(S): at 17:29

## 2021-08-06 RX ADMIN — Medication 650 MILLIGRAM(S): at 05:40

## 2021-08-06 RX ADMIN — Medication 650 MILLIGRAM(S): at 17:58

## 2021-08-06 RX ADMIN — HEPARIN SODIUM 5000 UNIT(S): 5000 INJECTION INTRAVENOUS; SUBCUTANEOUS at 17:30

## 2021-08-06 RX ADMIN — Medication 400 MILLIGRAM(S): at 00:16

## 2021-08-06 RX ADMIN — SODIUM CHLORIDE 3 MILLILITER(S): 9 INJECTION INTRAMUSCULAR; INTRAVENOUS; SUBCUTANEOUS at 23:30

## 2021-08-06 RX ADMIN — Medication 1000 MILLIGRAM(S): at 01:39

## 2021-08-06 RX ADMIN — Medication 100 MILLIEQUIVALENT(S): at 08:44

## 2021-08-06 RX ADMIN — SODIUM CHLORIDE 3 MILLILITER(S): 9 INJECTION INTRAMUSCULAR; INTRAVENOUS; SUBCUTANEOUS at 11:45

## 2021-08-06 NOTE — SWALLOW BEDSIDE ASSESSMENT ADULT - SLP PERTINENT HISTORY OF CURRENT PROBLEM
70 y/o M with PMH of end stage Alzheimer's (nonverbal at baseline, requires full assist from his family with ADLs), lifelong non-smoker, with no history of intrinsic lung disease. He is out of bed and into a wheelchair daily. He eats chopped up regular food but frequently coughs when fed. Over the last several days, the patient has become more lethargic than usual. He has a worsening cough w/ copious sputum production which is getting caught in the back of his throat. +chest congestion & wheeze. He had fevers and chills 2 days ago. He has no chest pain/pressure or palpitations. The patient had COVID infection last year requiring a 10 day hospitalization. He remains unvaccinated. The patient has been febrile in the ER. Viral swab -> RSV. Asked to evaluate. Pulmonary consulted for RSV infection; bronchospasm; mucous plugging; hypoxemia; dyspnea. CXR is without evidence of PNA - the patient has hypercapnia and a mild respiratory acidosis on VBG. NPO till mentation improves. Consider S&S.

## 2021-08-06 NOTE — SWALLOW BEDSIDE ASSESSMENT ADULT - SWALLOW EVAL: PATIENT/FAMILY GOALS STATEMENT
"Can I please give him applesauce or juice? He's so hungry." Pt's daughter reported that pt coughs occasionally when eating/drinking at home.

## 2021-08-06 NOTE — SWALLOW BEDSIDE ASSESSMENT ADULT - SLP GENERAL OBSERVATIONS
Pt was received at bedside sleeping with daughter present. +contact precautions (+RSV), +room air, +intermittent twitching/jerking movements (daughter reported this as baseline "for many years"). Pt with baseline wet/gurgly vocal quality and breath sounds with frequent throat clearing/coughing on secretions. +non-verbal (baseline, Alzheimer's dx). Pt unable to follow commands.

## 2021-08-06 NOTE — PROGRESS NOTE ADULT - ASSESSMENT
70 yo M     hx dementia nonverbal at baseline,  has trouble  ambulating  at baseline, needs help, per  family   . Per   daughter: pt   had   short of breath, productive coughing  with  lethargy.     pt's daughter Sade Roca is HCP  seen by    dr Hubbard/, in the past, pt remains  full code , at  this  time  ct ., right  inguinal hernia  with bladder involvement,  per  urology,   no  intervention needed  Pysch  eval, noted,  pt with prior  h/o  dementia   and  agitation    was on haldol prn , for    delirium/  agitation     ct  head,  marked  atrophy/  elevated     inflammatory markers. from  probable  covid , in  the past      now, admitted  with worsening ams/  ftt    pt at baseline is  non verbal    given pt's mental staus, is at high risk for  aspiration   npo/  iv  fluids.  swallow  eval    mild tachypnea,  seen by pulm gen[facility] linda   on dvt ppx,   pt  is not  DNR   ,  with negative  blood/urine   c/s   swallow  eval. on iv fluids  febrile now, remains on iv rocephin   hyponatremia, hypokalemia   and   hypoalbuminemia      pts  dr  is dr ha ambrocio    from: Xray Chest 1 View- PORTABLE-Urgent (04.24.20 @ 07:48) >  IMPRESSION: Diffuse bilateral patchy opacities likely representing atypical/viral pneumonia. COVID 19 is considered.  < end of copied text >    < from: CT Abdomen and Pelvis w/ IV Cont (04.24.20 @ 10:00) >  IMPRESSION:   Right inguinal hernia that involves a portion of the bladder fundus. See above.  No evidence of bowel related inflammation nor obstruction.  Moderate bibasilar parenchymal infiltrates compatible with atypical pneumonia including Covid 19.  The pertinent findings were discussed with Dr. Díaz at the conclusion of today's evaluation  < end of copied text >

## 2021-08-06 NOTE — SWALLOW BEDSIDE ASSESSMENT ADULT - SWALLOW EVAL: SECRETION MANAGEMENT
Pt with baseline wet/gurgly vocal quality and breath sounds with frequent throat clearing/coughing on secretions. Clinician provided suctioning mid-way through evaluation which resulted in removal of mild clear secretions in lateral sulci.

## 2021-08-06 NOTE — PROGRESS NOTE ADULT - ASSESSMENT
ASSESSMENT:    advanced dementia with underlying dysphagia admitted with RSV infection complicated by bronchospasm, mucous plugging and hypoxemia - he remains unvaccinated for COVID but has antibodies from a prior infection - CXR is without evidence of pneumonia - ABG reveals resolution of the respiratory acidosis and borderline oxygenation    PLAN/RECOMMENDATIONS:    oxygen supplementation to keep saturation greater than 92% - decrease nasal canula to 2lpm  head of bed elevation greater than 30 degrees at all times  NPO until mental status improves - await speech and swallow evaluation   ceftriaxone x 5 days - blood and urine cultures are negative  albuterol/atrovent nebs q6h  mucomyst and hypertonic saline nebs to facilitate mucous clearance  diligent chest PT   discontinue chest vest  oral hygiene and suctioning  acetaminophen for fever  DVT prophylaxis  IV fluids    Will follow with you. Plan of care discussed with the patient's daughter at bedside and the dedicated floor NP.     Ranjan De Guzman MD, [facility]  [phone]  Pulmonary Medicine

## 2021-08-06 NOTE — SWALLOW BEDSIDE ASSESSMENT ADULT - SWALLOW EVAL: DIAGNOSIS
70 y/o M with PMH of end stage Alzheimer's (nonverbal at baseline, requires full assist from his family with ADLs) and lifelong non-smoker admitted with RSV infection complicated by bronchospasm, mucous plugging, and hypoxemia. Pt seen for bedside swallow evaluation which revealed poor secretion management. Pt demonstrated baseline wet/gurgly vocal quality and breath sounds with frequent throat clearing and coughing on secretions. Daughter reported that pt's cough is intermittently productive of sputum which she removes via Yankauer. She reported concern of pt choking on sputum if she is not present at all times. NP, Hayde, made aware. +eyes closed (daughter reported pt eats with eyes closed), +non-verbal (baseline- Alzheimer's dx). Daughter adamant re: feeding pt. Pt administered small piece of crushed ice chip. He demonstrated immediate aggressive coughing post intake. No further PO trials given HIGH RISK FOR ASPIRATION.

## 2021-08-06 NOTE — SWALLOW BEDSIDE ASSESSMENT ADULT - COMMENTS
Hx cont:  8/5: : minimally more responsive - daughter reports that he knows he is in the hospital and recognizes her; no shortness of breath or hypoxemia on a nasal canula @ 4lpm; ongoing cough with difficulty expectorating sputum; ongoing chest congestion and wheeze; no fevers, chills or sweats; no chest pain/pressure or palpitations; NPO.  8/6: febrile. temp of 102.1. Pt given IV Tylenol.     IMAGING:  CXR: 8/4/21  IMPRESSION:  Clear lungs.    Pt is unknown to this service.

## 2021-08-06 NOTE — PROVIDER CONTACT NOTE (OTHER) - ASSESSMENT
pt is febrile temp of 102.1 axillary. BP: 124/68, HR:81, spOp2: 95 on 3L NC O2, RR: 18. pt nonverbal and restless at baseline. no s/s distress noted. pt febrile earlier during the day 100.8 orally. pt NPO

## 2021-08-06 NOTE — CHART NOTE - NSCHARTNOTEFT_GEN_A_CORE
Medicine PA Fever Note    Notified by RN patient with temperature 102.1F. Pt. seen and examined at bedside with family member at bedside. Unable to assess ROS due to Pt's advanced dementia. Per family member, Pt is at baseline MS, moving all 4 extremities spontaneously. Pt with occasional cough.    VITAL SIGNS:  T(C): 38.9 (08-06-21 @ 00:13), Max: 38.9 (08-06-21 @ 00:13)  HR: 81 (08-06-21 @ 00:13) (77 - 92)  BP: 124/68 (08-06-21 @ 00:13) (105/64 - 154/84)  RR: 18 (08-06-21 @ 00:13) (18 - 20)  SpO2: 95% (08-06-21 @ 00:13) (92% - 96%)  Wt(kg): --      LABORATORY:                          13.0   8.72  )-----------( 122      ( 05 Aug 2021 07:16 )             39.0       08-05    136  |  100  |  6<L>  ----------------------------<  109<H>  3.6   |  22  |  0.50    Ca    8.8      05 Aug 2021 07:14    TPro  5.3<L>  /  Alb  2.9<L>  /  TBili  0.6  /  DBili  x   /  AST  13  /  ALT  15  /  AlkPhos  70  08-05            PHYSICAL EXAM:    Constitutional: NAD, laying in bed    Respiratory: coarse breath sounds heard over L lung field    Cardiovascular: S1 S2. No murmurs, rubs or gallops.    Gastrointestinal: BS X4 active. soft. nontender.    Extremities/Vascular: +2 pulses bilaterally. No BLE edema.      ASSESSMENT/PLAN:   HPI:  70yo M Hx of end stage alzhiemers nonverbal at baseline presenting with complaints of SOB. per EMS pt has been coughing for the past several days with worsening SOB today. no fevers at home. not vaccinated for covid. no n/v. Pt now presenting with fever 102.1F.    #  Fever 2/2 RSV  - IV Tylenol and cooling measures prn for pyrexia  - BC x2 ordered for AM  - CXR noted from 8/4 - Clear lungs  - C/W Rocephin for ?PNA  - F/U primary team in AM    Follow up with Attending in AM    Isabela Baldwin PA-C   Department of Medicine

## 2021-08-06 NOTE — SWALLOW BEDSIDE ASSESSMENT ADULT - DIET PRIOR TO ADMI
Per Pulmonary Consult note: "He eats chopped up regular food but frequently coughs when fed." Regular texture diet (chopped up into small pieces) and thin liquids per daughter

## 2021-08-07 LAB
ANION GAP SERPL CALC-SCNC: 12 MMOL/L — SIGNIFICANT CHANGE UP (ref 5–17)
BUN SERPL-MCNC: 7 MG/DL — SIGNIFICANT CHANGE UP (ref 7–23)
CALCIUM SERPL-MCNC: 9.4 MG/DL — SIGNIFICANT CHANGE UP (ref 8.4–10.5)
CHLORIDE SERPL-SCNC: 103 MMOL/L — SIGNIFICANT CHANGE UP (ref 96–108)
CO2 SERPL-SCNC: 26 MMOL/L — SIGNIFICANT CHANGE UP (ref 22–31)
CREAT SERPL-MCNC: 0.62 MG/DL — SIGNIFICANT CHANGE UP (ref 0.5–1.3)
GLUCOSE SERPL-MCNC: 92 MG/DL — SIGNIFICANT CHANGE UP (ref 70–99)
HCT VFR BLD CALC: 41.9 % — SIGNIFICANT CHANGE UP (ref 39–50)
HGB BLD-MCNC: 13.9 G/DL — SIGNIFICANT CHANGE UP (ref 13–17)
MAGNESIUM SERPL-MCNC: 1.9 MG/DL — SIGNIFICANT CHANGE UP (ref 1.6–2.6)
MCHC RBC-ENTMCNC: 31.2 PG — SIGNIFICANT CHANGE UP (ref 27–34)
MCHC RBC-ENTMCNC: 33.2 GM/DL — SIGNIFICANT CHANGE UP (ref 32–36)
MCV RBC AUTO: 94.2 FL — SIGNIFICANT CHANGE UP (ref 80–100)
NRBC # BLD: 0 /100 WBCS — SIGNIFICANT CHANGE UP (ref 0–0)
PLATELET # BLD AUTO: 144 K/UL — LOW (ref 150–400)
POTASSIUM SERPL-MCNC: 3.6 MMOL/L — SIGNIFICANT CHANGE UP (ref 3.5–5.3)
POTASSIUM SERPL-SCNC: 3.6 MMOL/L — SIGNIFICANT CHANGE UP (ref 3.5–5.3)
RBC # BLD: 4.45 M/UL — SIGNIFICANT CHANGE UP (ref 4.2–5.8)
RBC # FLD: 14.1 % — SIGNIFICANT CHANGE UP (ref 10.3–14.5)
SODIUM SERPL-SCNC: 141 MMOL/L — SIGNIFICANT CHANGE UP (ref 135–145)
WBC # BLD: 4.67 K/UL — SIGNIFICANT CHANGE UP (ref 3.8–10.5)
WBC # FLD AUTO: 4.67 K/UL — SIGNIFICANT CHANGE UP (ref 3.8–10.5)

## 2021-08-07 RX ADMIN — Medication 3 MILLILITER(S): at 23:19

## 2021-08-07 RX ADMIN — CARBAMIDE PEROXIDE 5 DROP(S): 81.86 SOLUTION/ DROPS AURICULAR (OTIC) at 05:32

## 2021-08-07 RX ADMIN — Medication 3 MILLILITER(S): at 05:21

## 2021-08-07 RX ADMIN — Medication 4 MILLILITER(S): at 13:10

## 2021-08-07 RX ADMIN — Medication 4 MILLILITER(S): at 05:20

## 2021-08-07 RX ADMIN — HEPARIN SODIUM 5000 UNIT(S): 5000 INJECTION INTRAVENOUS; SUBCUTANEOUS at 17:49

## 2021-08-07 RX ADMIN — SODIUM CHLORIDE 3 MILLILITER(S): 9 INJECTION INTRAMUSCULAR; INTRAVENOUS; SUBCUTANEOUS at 23:19

## 2021-08-07 RX ADMIN — CARBAMIDE PEROXIDE 5 DROP(S): 81.86 SOLUTION/ DROPS AURICULAR (OTIC) at 17:57

## 2021-08-07 RX ADMIN — SODIUM CHLORIDE 3 MILLILITER(S): 9 INJECTION INTRAMUSCULAR; INTRAVENOUS; SUBCUTANEOUS at 05:21

## 2021-08-07 RX ADMIN — HEPARIN SODIUM 5000 UNIT(S): 5000 INJECTION INTRAVENOUS; SUBCUTANEOUS at 05:21

## 2021-08-07 RX ADMIN — SODIUM CHLORIDE 60 MILLILITER(S): 9 INJECTION, SOLUTION INTRAVENOUS at 17:54

## 2021-08-07 RX ADMIN — Medication 3 MILLILITER(S): at 12:07

## 2021-08-07 RX ADMIN — SODIUM CHLORIDE 3 MILLILITER(S): 9 INJECTION INTRAMUSCULAR; INTRAVENOUS; SUBCUTANEOUS at 12:06

## 2021-08-07 RX ADMIN — Medication 3 MILLILITER(S): at 17:50

## 2021-08-07 RX ADMIN — CEFTRIAXONE 100 MILLIGRAM(S): 500 INJECTION, POWDER, FOR SOLUTION INTRAMUSCULAR; INTRAVENOUS at 12:07

## 2021-08-07 RX ADMIN — SODIUM CHLORIDE 60 MILLILITER(S): 9 INJECTION, SOLUTION INTRAVENOUS at 05:20

## 2021-08-07 RX ADMIN — SODIUM CHLORIDE 3 MILLILITER(S): 9 INJECTION INTRAMUSCULAR; INTRAVENOUS; SUBCUTANEOUS at 17:50

## 2021-08-07 RX ADMIN — Medication 4 MILLILITER(S): at 23:19

## 2021-08-07 NOTE — PROGRESS NOTE ADULT - ASSESSMENT
72 yo M     hx dementia nonverbal at baseline,  has trouble  ambulating  at baseline, needs help, per  family   . Per   daughter: pt   had   short of breath, productive coughing  with  lethargy.     pt's daughter Sade Roca is HCP  seen by    dr Hubbard/, in the past, pt remains  full code , at  this  time  ct ., right  inguinal hernia  with bladder involvement,  per  urology,   no  intervention needed  Pysch  eval, noted,  pt with prior  h/o  dementia   and  agitation    was on haldol prn , for    delirium/  agitation     ct  head,  marked  atrophy/  elevated     inflammatory markers. from  probable  covid , in  the past      now, admitted  with worsening ams/  ftt    pt at baseline is  non verbal    given pt's mental staus, is at high risk for  aspiration   npo/  iv  fluids.  swallow  eval    s/p  mild tachypnea,  seen by pulm [facility] linda   on dvt ppx,   pt  is not  DNR   ,  with negative  blood/urine   c/s    afebrile  now, remains on iv rocephin   hypoalbuminemia  per  swallow  eval, pt is  npo,  will  need peg      pts  dr  is dr ha ambrocio    from: Xray Chest 1 View- PORTABLE-Urgent (04.24.20 @ 07:48) >  IMPRESSION: Diffuse bilateral patchy opacities likely representing atypical/viral pneumonia. COVID 19 is considered.  < end of copied text >    < from: CT Abdomen and Pelvis w/ IV Cont (04.24.20 @ 10:00) >  IMPRESSION:   Right inguinal hernia that involves a portion of the bladder fundus. See above.  No evidence of bowel related inflammation nor obstruction.  Moderate bibasilar parenchymal infiltrates compatible with atypical pneumonia including Covid 19.  The pertinent findings were discussed with Dr. Díaz at the conclusion of today's evaluation  < end of copied text >

## 2021-08-07 NOTE — PROGRESS NOTE ADULT - ASSESSMENT
ASSESSMENT:    advanced dementia with underlying dysphagia admitted with RSV infection complicated by bronchospasm, mucous plugging and hypoxemia - he remains unvaccinated for COVID but has antibodies from a prior infection - CXR is without evidence of pneumonia - ABG reveals resolution of the respiratory acidosis and borderline oxygenation    PLAN/RECOMMENDATIONS:    oxygen supplementation to keep saturation greater than 92% - taper as tolerated  head of bed elevation greater than 30 degrees at all times  NPO until mental status improves - await speech and swallow evaluation   ceftriaxone x 5 days - blood and urine cultures are negative  albuterol/atrovent nebs q6h  mucomyst and hypertonic saline nebs to facilitate mucous clearance  diligent chest PT   oral hygiene and suctioning  acetaminophen for fever  DVT prophylaxis  IV fluids    Will follow with you. Plan of care discussed with the patient's daughter at bedside and the dedicated floor NP.     Ranjan De Guzman MD, [facility]  [phone]  Pulmonary Medicine

## 2021-08-08 RX ORDER — ACETAMINOPHEN 500 MG
1000 TABLET ORAL ONCE
Refills: 0 | Status: COMPLETED | OUTPATIENT
Start: 2021-08-08 | End: 2021-08-08

## 2021-08-08 RX ORDER — ACETAMINOPHEN 500 MG
650 TABLET ORAL ONCE
Refills: 0 | Status: DISCONTINUED | OUTPATIENT
Start: 2021-08-08 | End: 2021-08-08

## 2021-08-08 RX ADMIN — Medication 3 MILLILITER(S): at 17:15

## 2021-08-08 RX ADMIN — SODIUM CHLORIDE 60 MILLILITER(S): 9 INJECTION, SOLUTION INTRAVENOUS at 06:36

## 2021-08-08 RX ADMIN — Medication 400 MILLIGRAM(S): at 03:29

## 2021-08-08 RX ADMIN — HEPARIN SODIUM 5000 UNIT(S): 5000 INJECTION INTRAVENOUS; SUBCUTANEOUS at 17:15

## 2021-08-08 RX ADMIN — CARBAMIDE PEROXIDE 5 DROP(S): 81.86 SOLUTION/ DROPS AURICULAR (OTIC) at 06:36

## 2021-08-08 RX ADMIN — Medication 1000 MILLIGRAM(S): at 04:43

## 2021-08-08 RX ADMIN — Medication 4 MILLILITER(S): at 21:01

## 2021-08-08 RX ADMIN — CARBAMIDE PEROXIDE 5 DROP(S): 81.86 SOLUTION/ DROPS AURICULAR (OTIC) at 17:11

## 2021-08-08 RX ADMIN — SODIUM CHLORIDE 3 MILLILITER(S): 9 INJECTION INTRAMUSCULAR; INTRAVENOUS; SUBCUTANEOUS at 17:15

## 2021-08-08 RX ADMIN — SODIUM CHLORIDE 3 MILLILITER(S): 9 INJECTION INTRAMUSCULAR; INTRAVENOUS; SUBCUTANEOUS at 11:23

## 2021-08-08 RX ADMIN — SODIUM CHLORIDE 3 MILLILITER(S): 9 INJECTION INTRAMUSCULAR; INTRAVENOUS; SUBCUTANEOUS at 06:20

## 2021-08-08 RX ADMIN — Medication 3 MILLILITER(S): at 06:20

## 2021-08-08 RX ADMIN — CEFTRIAXONE 100 MILLIGRAM(S): 500 INJECTION, POWDER, FOR SOLUTION INTRAMUSCULAR; INTRAVENOUS at 11:23

## 2021-08-08 RX ADMIN — SODIUM CHLORIDE 3 MILLILITER(S): 9 INJECTION INTRAMUSCULAR; INTRAVENOUS; SUBCUTANEOUS at 23:19

## 2021-08-08 RX ADMIN — Medication 3 MILLILITER(S): at 11:23

## 2021-08-08 RX ADMIN — Medication 4 MILLILITER(S): at 13:14

## 2021-08-08 RX ADMIN — HEPARIN SODIUM 5000 UNIT(S): 5000 INJECTION INTRAVENOUS; SUBCUTANEOUS at 06:20

## 2021-08-08 RX ADMIN — Medication 4 MILLILITER(S): at 06:21

## 2021-08-08 RX ADMIN — Medication 3 MILLILITER(S): at 23:19

## 2021-08-08 NOTE — CHART NOTE - NSCHARTNOTEFT_GEN_A_CORE
Notified by RN that daughter wants to take patient home.  Spoke with the speech therapist who noted that patient is unable to clear his secretions and is high risk for aspiration.  Per PT note, "Mod ax2 for sup<>sit and Max Ax2 for sit<>stand. Pt unsteady w/ shaking upon standing, unable to stand straight despite of 2 assist and max cues. unable to ambulate. Pt needs max coaxing by the family to participate. often fights with PT with movements. Aj EOB sitting ~5-7m w/ mod Ax1 for trunk control."      Initiated conversation with daughter Sade via  #[id number], however, daughter refused interpretation in both English and Czech - stating clearly several times in English that she understands English perfectly.  Spoke with daughter regarding risk for aspiration, recommendation that patient take nothing by mouth, patient's inability to stand and walk requiring assistance of more than one person, the recommendation that patient be discharged to rehab, and risk of complications up to and including death from both aspiration and fall.  Daughter expressed understanding and stated that patient has been requiring assistance of family for a long time and insisting that she wants to take him home.  Daughter endorses that family will assume the risks as discussed.    Daughter Sade also declines COVID vaccine for patient at this time.    Above has been communicated with Dr. Hobbs.    Yoselin Rothman NP  [phone]

## 2021-08-08 NOTE — PHYSICAL THERAPY INITIAL EVALUATION ADULT - DISCHARGE DISPOSITION, PT EVAL
Recommended NANCY. family declined and wants to take the pt home. Recommend home PT, and will req. assistance for all mobility/ADls.

## 2021-08-08 NOTE — PROGRESS NOTE ADULT - ASSESSMENT
ASSESSMENT:    advanced dementia with underlying dysphagia admitted with RSV infection complicated by bronchospasm, mucous plugging and hypoxemia - he remains unvaccinated for COVID but has antibodies from a prior infection - CXR is without evidence of pneumonia - ABG reveals resolution of the respiratory acidosis and borderline oxygenation    PLAN/RECOMMENDATIONS:    stable oxygenation on room air  head of bed elevation greater than 30 degrees at all times  NPO until mental status improves - await speech and swallow evaluation   ceftriaxone x 5 days - blood and urine cultures are negative  albuterol/atrovent nebs q6h  mucomyst and hypertonic saline nebs to facilitate mucous clearance  diligent chest PT   oral hygiene and suctioning  acetaminophen for fever  DVT prophylaxis  IV fluids  physical therapy - ambulate    Will follow with you. Plan of care discussed with the patient's son at bedside and the dedicated floor NP. Discharge planning for 8/10.     Ranjan De Guzman MD, [facility]  [phone]  Pulmonary Medicine

## 2021-08-08 NOTE — CHART NOTE - NSCHARTNOTEFT_GEN_A_CORE
Education with family. Interactive Discussion w/ team.     This is a 72 y/o M with PMH of end stage Alzheimer's (nonverbal at baseline). He eats chopped up regular food but frequently coughs when fed. Over the last several days, the patient has become more lethargic than usual. He has a worsening cough w/ copious sputum production which is getting caught in the back of his throat. +chest congestion & wheeze. Pulmonary consulted for RSV infection; bronchospasm; mucous plugging; hypoxemia; dyspnea. 8/4 CXR: Clear lungs.    Interval history:  lethargic; arouses to deep painful stimulation; no shortness of breath or hypoxemia on a nasal canula @ 3lpm; ongoing cough with difficulty expectorating sputum; chest congestion and wheeze.    Speech/Swallow history: Pt seen on 8/6 with recommendations for NPO status. As per documentation evaluation "revealed poor secretion management. Pt demonstrated baseline wet/gurgly vocal quality and breath sounds with frequent throat clearing and coughing on secretions. Daughter reported that pt's cough is intermittently productive of sputum which she removes via Yankauer. She reported concern of pt choking on sputum if she is not present at all times. Daughter adamant re: feeding pt. Pt administered small piece of crushed ice chip. He demonstrated immediate aggressive coughing post intake. No further PO trials given HIGH RISK FOR ASPIRATION". Re-evaluation completed earlier today with continuation of NPO status recommendations given inability to protect airway and inability to manage own secretions.     Received call from NP Yoselin and Yaritza with regards to wish for re-evaluation of swallow function with family member present as per attending request, 2/2 family member "feeding him a certain way". Interactive discussion held w/ team members with regards to findings from this re-evaluation such as inability to manage secretions, wet breathe sounds at baseline, and reddening in the face with subsequent effortful coughing following trial of single, crushed piece of ice x1 this date. A re-evaluation would not be appropriate at this time, as it appears that the patient is not able to manage his own secretions. Extensive education and rationale was provided to patients daughter at the bedside with regards to NPO recommendation and findings from this re-evaluation. +Patient restless with wet breathe sounds at baseline with notable coughing on own secretions. All questions were answered. Pt's daughter endorsed comprehension of information provided to her, however proceeded to request information on what she should feed him upon discharge home this date. Reiterated information on aspiration, NPO recommendation, and current risk factors. Purposeful proactive rounding reinforced and 5 Ps addressed. Pt left in no distress. Discussion held w/ NP Yoselin and CORINA Silva following interaction with patients daughter. Team to follow up with daughter.     Recommendations:   > NPO, with non-oral nutrition/hydration/medications as per GOC  > If family/pt declines NPO recommendations would suggest GOC discussion. If wish for po being aware of the HIGH likelihood of aspiration w/ possible complications such as intubation, would suggest dysphagia 1 and honey thick liquids as conservative textures.   > Stringent oral care with suction to reduce oral pathogens   > Aspiration precautions   > Diligent chest PT   > May benefit from transnasal suctioning given inability to expectorate secretions, as verbally reported  > Head of bed elevation greater than 30 degrees at all times to assist with secretion management   > Service will follow up for re-evaluation in 1-2 days, as per request from team, with poc tbd.    Tabitha Barron MS CCC-SLP   Speech-Language Pathologist    pager [phone] Education with family. Interactive Discussion w/ team.     This is a 72 y/o M with PMH of end stage Alzheimer's (nonverbal at baseline). He eats chopped up regular food but frequently coughs when fed. Over the last several days, the patient has become more lethargic than usual. He has a worsening cough w/ copious sputum production which is getting caught in the back of his throat. +chest congestion & wheeze. Pulmonary consulted for RSV infection; bronchospasm; mucous plugging; hypoxemia; dyspnea. 8/4 CXR: Clear lungs.    Interval history:  lethargic; arouses to deep painful stimulation; no shortness of breath or hypoxemia on a nasal canula @ 3lpm; ongoing cough with difficulty expectorating sputum; chest congestion and wheeze.    Speech/Swallow history: Pt seen on 8/6 with recommendations for NPO status. As per documentation evaluation "revealed poor secretion management. Pt demonstrated baseline wet/gurgly vocal quality and breath sounds with frequent throat clearing and coughing on secretions. Daughter reported that pt's cough is intermittently productive of sputum which she removes via Yankauer. She reported concern of pt choking on sputum if she is not present at all times. Daughter adamant re: feeding pt. Pt administered small piece of crushed ice chip. He demonstrated immediate aggressive coughing post intake. No further PO trials given HIGH RISK FOR ASPIRATION". Re-evaluation completed earlier today with continuation of NPO status recommendations given inability to protect airway and inability to manage own secretions.     Received call from NP Yoselin and Yaritza with regards to wish for re-evaluation of swallow function with family member present as per attending request, 2/2 family member "feeding him a certain way". Interactive discussion held w/ team members with regards to findings from this re-evaluation such as inability to manage secretions, wet breathe sounds at baseline, and reddening in the face with subsequent effortful coughing following trial of single, crushed piece of ice x1 this date. A re-evaluation would not be appropriate at this time, as it appears that the patient is not able to manage his own secretions. Extensive education and rationale was provided to patients daughter at the bedside with regards to NPO recommendation and findings from this re-evaluation. Observed patient to be restless with wet breathe sounds at baseline with notable coughing on own secretions at time of this visit. All questions were answered. Pt's daughter endorsed comprehension of information provided to her, however proceeded to request information on what she should feed him upon discharge home this date. Reiterated information on aspiration, NPO recommendation, and current risk factors. Purposeful proactive rounding reinforced and 5 Ps addressed. Pt left in no distress. Discussion held w/ NP Yoselin and CORINA Silva following interaction with patients daughter. Team to follow up with daughter.     Recommendations:   > NPO, with non-oral nutrition/hydration/medications as per GOC  > If family/pt declines NPO recommendations would suggest GOC discussion. If wish for po being aware of the HIGH likelihood of aspiration w/ possible complications such as intubation, would suggest dysphagia 1 and honey thick liquids as conservative textures.   > Stringent oral care with suction to reduce oral pathogens   > Aspiration precautions   > Diligent chest PT   > May benefit from transnasal suctioning given inability to expectorate secretions, as verbally reported  > Head of bed elevation greater than 30 degrees at all times to assist with secretion management   > Service will follow up for re-evaluation in 1-2 days, as per request from team, with poc tbd.    Tabitha Barron MS CCC-SLP   Speech-Language Pathologist    pager [phone] Education with family. Interactive Discussion w/ team.     This is a 70 y/o M with PMH of end stage Alzheimer's (nonverbal at baseline). He eats chopped up regular food but frequently coughs when fed. Over the last several days, the patient has become more lethargic than usual. He has a worsening cough w/ copious sputum production which is getting caught in the back of his throat. +chest congestion & wheeze. Pulmonary consulted for RSV infection; bronchospasm; mucous plugging; hypoxemia; dyspnea. 8/4 CXR: Clear lungs.    Interval history:  lethargic; arouses to deep painful stimulation; no shortness of breath or hypoxemia on a nasal canula @ 3lpm; ongoing cough with difficulty expectorating sputum; chest congestion and wheeze.    Speech/Swallow history: Pt seen on 8/6 with recommendations for NPO status. As per documentation evaluation "revealed poor secretion management. Pt demonstrated baseline wet/gurgly vocal quality and breath sounds with frequent throat clearing and coughing on secretions. Daughter reported that pt's cough is intermittently productive of sputum which she removes via Yankauer. She reported concern of pt choking on sputum if she is not present at all times. Daughter adamant re: feeding pt. Pt administered small piece of crushed ice chip. He demonstrated immediate aggressive coughing post intake. No further PO trials given HIGH RISK FOR ASPIRATION". Re-evaluation completed earlier today with continuation of NPO status recommendations given inability to protect airway and inability to manage own secretions.     Received call from NP Yoselin and Yaritza with regards to wish for re-evaluation of swallow function with family member present as per attending request, 2/2 family member "feeding him a certain way". Interactive discussion held w/ team members with regards to findings from this re-evaluation such as inability to manage secretions, wet breathe sounds at baseline, and reddening in the face with subsequent effortful coughing following trial of single, crushed piece of ice x1 this date. A re-evaluation would not be appropriate at this time, as it appears that the patient is not able to manage his own secretions. Extensive education and rationale was provided to patients daughter at the bedside with regards to NPO recommendation and findings from this re-evaluation. Observed patient to be restless with wet breathe sounds at baseline with notable coughing on own secretions at time of this visit. All questions were answered. Pt's daughter endorsed comprehension of information provided to her, however proceeded to request information on what she should feed him upon discharge home this date. Reiterated information on aspiration, NPO recommendation, and current risk factors. Purposeful proactive rounding reinforced and 5 Ps addressed. Pt left in no distress. Discussion held w/ NP Yoselin and CORINA Silva following interaction with patients daughter. Team to follow up with daughter.     Recommendations:   > NPO, with non-oral nutrition/hydration/medications as per GOC  > If family/pt declines NPO recommendations would suggest GOC discussion. If wish for po being aware of the HIGH likelihood of aspiration w/ possible complications such as intubation, would suggest dysphagia 1 and honey thick liquids as conservative textures.   > Stringent oral care with suction to reduce oral pathogens   > Aspiration precautions   > Diligent chest PT   > May benefit from transnasal suctioning given inability to expectorate secretions, as verbally reported  > Head of bed elevation greater than 30 degrees at all times to assist with secretion management   > Service will follow up pending GOC as daughter wishing to be discharged at this time     Tabitha Barron MS CCC-SLP   Speech-Language Pathologist    pager [phone]

## 2021-08-08 NOTE — PHYSICAL THERAPY INITIAL EVALUATION ADULT - STRENGTHENING, PT EVAL
Pt will demonstrate good strength in BLE/BUEs to improve limb stability during ADLs/mobility in 4 weeks

## 2021-08-08 NOTE — PROGRESS NOTE ADULT - ASSESSMENT
70 yo M     hx dementia nonverbal at baseline,  has trouble  ambulating  at baseline, needs help, per  family   . Per   daughter: pt   had   short of breath, productive coughing  with  lethargy.     pt's daughter Sade Roca is HCP  seen by    dr Hubbard/, in the past, pt remains  full code , at  this  time  ct ., right  inguinal hernia  with bladder involvement,  per  urology,   no  intervention needed  Pysch  eval, noted,  pt with prior  h/o  dementia   and  agitation    was on haldol prn , for    delirium/  agitation     ct  head,  marked  atrophy/  elevated     inflammatory markers. from  probable  covid , in  the past      now, admitted  with worsening ams/  ftt    pt at baseline is  non verbal    given pt's mental staus, is at high risk for  aspiration   npo/  iv  fluids.  swallow  eval    s/p  mild tachypnea,  seen by pulm geneva linda   on dvt ppx,   pt  is not  DNR   ,  with negative  blood/urine   c/s    afebrile  now, remains on iv rocephin   hypoalbuminemia  per  swallow  eval, pt is  npo,  will  need peg    gi  eval  for  peg. on iv fluids      pts  dr  is dr ha ambrocio    from: Xray Chest 1 View- PORTABLE-Urgent (04.24.20 @ 07:48) >  IMPRESSION: Diffuse bilateral patchy opacities likely representing atypical/viral pneumonia. COVID 19 is considered.  < end of copied text >    < from: CT Abdomen and Pelvis w/ IV Cont (04.24.20 @ 10:00) >  IMPRESSION:   Right inguinal hernia that involves a portion of the bladder fundus. See above.  No evidence of bowel related inflammation nor obstruction.  Moderate bibasilar parenchymal infiltrates compatible with atypical pneumonia including Covid 19.  The pertinent findings were discussed with Dr. Díaz at the conclusion of today's evaluation  < end of copied text >          72 yo M     hx dementia nonverbal at baseline,  has trouble  ambulating  at baseline, needs help, per  family   . Per   daughter: pt   had   short of breath, productive coughing  with  lethargy.     pt's daughter Sade Roca is HCP  seen by    dr Hubbard/, in the past, pt remains  full code , at  this  time  ct ., right  inguinal hernia  with bladder involvement,  per  urology,   no  intervention needed  Pysch  eval, noted,  pt with prior  h/o  dementia   and  agitation    was on haldol prn , for    delirium/  agitation     ct  head,  marked  atrophy/  elevated     inflammatory markers. from  probable  covid , in  the past      now, admitted  with worsening ams/  ftt    pt at baseline is  non verbal    given pt's mental staus, is at high risk for  aspiration   npo/  iv  fluids.  swallow  eval    s/p  mild tachypnea,  seen by pulm geneva linda   on dvt ppx,   pt  is not  DNR   ,  with negative  blood/urine   c/s    afebrile  now, remains on iv rocephin   hypoalbuminemia  per  swallow  eval, pt is  npo,  will  need peg    gi  eval  for  peg. on iv fluids.  was refused  by daughter  daughter wants pt fed,  aware of risks of aspiration/  pna/ death   and also wants   pt  d/c today      pts  dr  is dr ha ambrocio    from: Xray Chest 1 View- PORTABLE-Urgent (04.24.20 @ 07:48) >  IMPRESSION: Diffuse bilateral patchy opacities likely representing atypical/viral pneumonia. COVID 19 is considered.  < end of copied text >    < from: CT Abdomen and Pelvis w/ IV Cont (04.24.20 @ 10:00) >  IMPRESSION:   Right inguinal hernia that involves a portion of the bladder fundus. See above.  No evidence of bowel related inflammation nor obstruction.  Moderate bibasilar parenchymal infiltrates compatible with atypical pneumonia including Covid 19.  The pertinent findings were discussed with Dr. Díaz at the conclusion of today's evaluation  < end of copied text >

## 2021-08-08 NOTE — CHART NOTE - NSCHARTNOTEFT_GEN_A_CORE
RE-EVALUATION OF SWALLOW FUNCTION:     This is a 70 y/o M with PMH of end stage Alzheimer's (nonverbal at baseline). He eats chopped up regular food but frequently coughs when fed. Over the last several days, the patient has become more lethargic than usual. He has a worsening cough w/ copious sputum production which is getting caught in the back of his throat. +chest congestion & wheeze. Pulmonary consulted for RSV infection; bronchospasm; mucous plugging; hypoxemia; dyspnea. 8/4 CXR: Clear lungs.    Interval history:  lethargic; arouses to deep painful stimulation; no shortness of breath or hypoxemia on a nasal canula @ 3lpm; ongoing cough with difficulty expectorating sputum; chest congestion and wheeze.    Speech/Swallow history: Pt seen on 8/6 with recommendations for NPO status. As per documentation evaluation "revealed poor secretion management. Pt demonstrated baseline wet/gurgly vocal quality and breath sounds with frequent throat clearing and coughing on secretions. Daughter reported that pt's cough is intermittently productive of sputum which she removes via Yankauer. She reported concern of pt choking on sputum if she is not present at all times. Daughter adamant re: feeding pt. Pt administered small piece of crushed ice chip. He demonstrated immediate aggressive coughing post intake. No further PO trials given HIGH RISK FOR ASPIRATION".     TODAY, pt seen for re-evaluation of swallow function. RN Hedy aware. Noted frequent need for suctioning as pt not able to manage his own secretions. +Contact precautions; RSV. Encountered in bed, resting. No family present. Provided thermal-tactile stimulation to face with wet wash cloth, which was beneficial in alerting the patient. Eyes remained closed throughout. Provided oral care with use of Yankauer for suction. +Baseline mild wet congestion. Mild improvement following volitional cough response post oral care. Repositioned fully upright. Offered x1 small, crushed piece of ice chip. Oral phase w/ adequate orientation/reception, adequate ability to strip the bolus from the tsp, prolonged however eventually effective manipulation of bolus leading to suspected premature spillage behind the BoT prior to the swallow response. Subsequently pt with immediate grimace, REDDENING IN THE FACE, with delayed WET, EFFORTFUL COUGH RESPONSE. No additional trials given INABILITY TO PROTECT AIRWAY and HIGH RISK FOR ASPIRATION, as described above. Purposeful proactive rounding reinforced and 5 Ps addressed. Pt left in no distress. Discussion held w/ Yoselin ACP following this intervention given current presentation.     Recommendations:   > NPO, with non-oral nutrition/hydration/medications as per GOC  > If family/pt declines NPO recommendations would suggest GOC discussion. Most conservative textures, if wish for po being aware of the high likelihood of aspiration, would suggest dysphagia 1 and honey thick liquids.   > Stringent oral care with suction to reduce oral pathogens   > Aspiration precautions   > Diligent chest PT   > May benefit from transnasal suctioning given inability to expectorate secretions, as verbally reported  > Head of bed elevation greater than 30 degrees at all times to assist with secretion management   > Service will follow up for re-evaluation in 1-2 days, as per request from team, with poc tbd. RE-EVALUATION OF SWALLOW FUNCTION:     This is a 70 y/o M with PMH of end stage Alzheimer's (nonverbal at baseline). He eats chopped up regular food but frequently coughs when fed. Over the last several days, the patient has become more lethargic than usual. He has a worsening cough w/ copious sputum production which is getting caught in the back of his throat. +chest congestion & wheeze. Pulmonary consulted for RSV infection; bronchospasm; mucous plugging; hypoxemia; dyspnea. 8/4 CXR: Clear lungs.    Interval history:  lethargic; arouses to deep painful stimulation; no shortness of breath or hypoxemia on a nasal canula @ 3lpm; ongoing cough with difficulty expectorating sputum; chest congestion and wheeze.    Speech/Swallow history: Pt seen on 8/6 with recommendations for NPO status. As per documentation evaluation "revealed poor secretion management. Pt demonstrated baseline wet/gurgly vocal quality and breath sounds with frequent throat clearing and coughing on secretions. Daughter reported that pt's cough is intermittently productive of sputum which she removes via Yankauer. She reported concern of pt choking on sputum if she is not present at all times. Daughter adamant re: feeding pt. Pt administered small piece of crushed ice chip. He demonstrated immediate aggressive coughing post intake. No further PO trials given HIGH RISK FOR ASPIRATION".     TODAY, pt seen for re-evaluation of swallow function. RN Hedy aware. Noted frequent need for suctioning as pt not able to manage his own secretions. NAD. +3LNC. Edentulous. +Contact precautions; RSV. Encountered in bed, resting. No family present. Provided thermal-tactile stimulation to face with wet wash cloth, which was beneficial in alerting the patient. Eyes remained closed throughout. Provided oral care with use of Yankauer for suction. +Baseline mild wet congestion. Mild improvement following volitional cough response post oral care. Repositioned fully upright. Offered x1 small, crushed piece of ice chip. Oral phase w/ adequate orientation/reception, adequate ability to strip the bolus from the tsp, prolonged however eventually effective manipulation of bolus leading to suspected premature spillage behind the BoT prior to the swallow response. Subsequently pt with immediate grimace, REDDENING IN THE FACE, with delayed WET, EFFORTFUL COUGH RESPONSE. No additional trials given INABILITY TO PROTECT AIRWAY and HIGH RISK FOR ASPIRATION, as described above. Purposeful proactive rounding reinforced and 5 Ps addressed. Pt left in no distress. Discussion held w/ Yoselin ACP following this intervention given current presentation.     Recommendations:   > NPO, with non-oral nutrition/hydration/medications as per GOC  > If family/pt declines NPO recommendations would suggest GOC discussion. Most conservative textures, if wish for po being aware of the high likelihood of aspiration, would suggest dysphagia 1 and honey thick liquids.   > Stringent oral care with suction to reduce oral pathogens   > Aspiration precautions   > Diligent chest PT   > May benefit from transnasal suctioning given inability to expectorate secretions, as verbally reported  > Head of bed elevation greater than 30 degrees at all times to assist with secretion management   > Service will follow up for re-evaluation in 1-2 days, as per request from team, with poc tbd. RE-EVALUATION OF SWALLOW FUNCTION:     This is a 72 y/o M with PMH of end stage Alzheimer's (nonverbal at baseline). He eats chopped up regular food but frequently coughs when fed. Over the last several days, the patient has become more lethargic than usual. He has a worsening cough w/ copious sputum production which is getting caught in the back of his throat. +chest congestion & wheeze. Pulmonary consulted for RSV infection; bronchospasm; mucous plugging; hypoxemia; dyspnea. 8/4 CXR: Clear lungs.    Interval history:  lethargic; arouses to deep painful stimulation; no shortness of breath or hypoxemia on a nasal canula @ 3lpm; ongoing cough with difficulty expectorating sputum; chest congestion and wheeze.    Speech/Swallow history: Pt seen on 8/6 with recommendations for NPO status. As per documentation evaluation "revealed poor secretion management. Pt demonstrated baseline wet/gurgly vocal quality and breath sounds with frequent throat clearing and coughing on secretions. Daughter reported that pt's cough is intermittently productive of sputum which she removes via Yankauer. She reported concern of pt choking on sputum if she is not present at all times. Daughter adamant re: feeding pt. Pt administered small piece of crushed ice chip. He demonstrated immediate aggressive coughing post intake. No further PO trials given HIGH RISK FOR ASPIRATION".     TODAY, pt seen for re-evaluation of swallow function. RN Hedy aware. Noted frequent need for suctioning as pt not able to manage his own secretions. NAD. +3LNC. Edentulous. +Contact precautions; RSV. Encountered in bed, resting. No family present. Provided thermal-tactile stimulation to face with wet wash cloth, which was beneficial in alerting the patient. Eyes remained closed throughout. Provided oral care with use of Yankauer for suction. +Baseline mild wet congestion. Mild improvement following volitional cough response post oral care. Repositioned fully upright. Offered x1 small, crushed piece of ice chip. Oral phase w/ adequate orientation/reception, adequate ability to strip the bolus from the tsp, prolonged however eventually effective manipulation of bolus leading to suspected premature spillage behind the BoT prior to the swallow response. Subsequently pt with immediate grimace w/ REDDENING IN THE FACE, with delayed WET, EFFORTFUL COUGH RESPONSE. Concern for apneic episode 2/2 inability to protect airway. No additional trials given INABILITY TO PROTECT AIRWAY and HIGH RISK FOR ASPIRATION, as described above. Purposeful proactive rounding reinforced and 5 Ps addressed. Pt left in no distress. Discussion held w/ Yoselin NORRIS and Yaritza following this intervention given current presentation.     Recommendations:   > NPO, with non-oral nutrition/hydration/medications as per GOC  > If family/pt declines NPO recommendations would suggest GOC discussion. If wish for po being aware of the HIGH likelihood of aspiration w/ possible complications such as intubation, would suggest dysphagia 1 and honey thick liquids as conservative textures.   > Stringent oral care with suction to reduce oral pathogens   > Aspiration precautions   > Diligent chest PT   > May benefit from transnasal suctioning given inability to expectorate secretions, as verbally reported  > Head of bed elevation greater than 30 degrees at all times to assist with secretion management   > Service will follow up for re-evaluation in 1-2 days, as per request from team, with poc tbd. RE-EVALUATION OF SWALLOW FUNCTION:     This is a 72 y/o M with PMH of end stage Alzheimer's (nonverbal at baseline). He eats chopped up regular food but frequently coughs when fed. Over the last several days, the patient has become more lethargic than usual. He has a worsening cough w/ copious sputum production which is getting caught in the back of his throat. +chest congestion & wheeze. Pulmonary consulted for RSV infection; bronchospasm; mucous plugging; hypoxemia; dyspnea. 8/4 CXR: Clear lungs.    Interval history:  lethargic; arouses to deep painful stimulation; no shortness of breath or hypoxemia on a nasal canula @ 3lpm; ongoing cough with difficulty expectorating sputum; chest congestion and wheeze.    Speech/Swallow history: Pt seen on 8/6 with recommendations for NPO status. As per documentation evaluation "revealed poor secretion management. Pt demonstrated baseline wet/gurgly vocal quality and breath sounds with frequent throat clearing and coughing on secretions. Daughter reported that pt's cough is intermittently productive of sputum which she removes via Yankauer. She reported concern of pt choking on sputum if she is not present at all times. Daughter adamant re: feeding pt. Pt administered small piece of crushed ice chip. He demonstrated immediate aggressive coughing post intake. No further PO trials given HIGH RISK FOR ASPIRATION".     TODAY, pt seen for re-evaluation of swallow function. RN Hedy aware. Noted frequent need for suctioning as pt not able to manage his own secretions. NAD. +3LNC. Edentulous. +Contact precautions; RSV. Encountered in bed, resting. No family present. Provided thermal-tactile stimulation to face with wet wash cloth, which was beneficial in alerting the patient. Eyes remained closed throughout. Provided oral care with use of Yankauer for suction. +Baseline mild-moderate wet congestion. Mild improvement following volitional cough response post oral care. Repositioned fully upright. Offered x1 small, crushed piece of ice chip. Oral phase w/ adequate orientation/reception, adequate ability to strip the bolus from the tsp, prolonged however eventually effective manipulation of bolus leading to suspected premature spillage behind the BoT prior to the swallow response. Subsequently pt with immediate grimace w/ REDDENING IN THE FACE, with delayed WET, EFFORTFUL COUGH RESPONSE. Concern for apneic episode 2/2 inability to protect airway. No additional trials given INABILITY TO PROTECT AIRWAY and HIGH RISK FOR ASPIRATION, as described above. Purposeful proactive rounding reinforced and 5 Ps addressed. Pt left in no distress. Discussion held w/ Yoselin NORRIS and Yaritza following this intervention given current presentation.     Recommendations:   > NPO, with non-oral nutrition/hydration/medications as per GOC  > If family/pt declines NPO recommendations would suggest GOC discussion. If wish for po being aware of the HIGH likelihood of aspiration w/ possible complications such as intubation, would suggest dysphagia 1 and honey thick liquids as conservative textures.   > Stringent oral care with suction to reduce oral pathogens   > Aspiration precautions   > Diligent chest PT   > May benefit from transnasal suctioning given inability to expectorate secretions, as verbally reported  > Head of bed elevation greater than 30 degrees at all times to assist with secretion management   > Service will follow up for re-evaluation in 1-2 days, as per request from team, with poc tbd. RE-EVALUATION OF SWALLOW FUNCTION:     This is a 72 y/o M with PMH of end stage Alzheimer's (nonverbal at baseline). He eats chopped up regular food but frequently coughs when fed. Over the last several days, the patient has become more lethargic than usual. He has a worsening cough w/ copious sputum production which is getting caught in the back of his throat. +chest congestion & wheeze. Pulmonary consulted for RSV infection; bronchospasm; mucous plugging; hypoxemia; dyspnea. 8/4 CXR: Clear lungs.    Interval history:  lethargic; arouses to deep painful stimulation; no shortness of breath or hypoxemia on a nasal canula @ 3lpm; ongoing cough with difficulty expectorating sputum; chest congestion and wheeze.    Speech/Swallow history: Pt seen on 8/6 with recommendations for NPO status. As per documentation evaluation "revealed poor secretion management. Pt demonstrated baseline wet/gurgly vocal quality and breath sounds with frequent throat clearing and coughing on secretions. Daughter reported that pt's cough is intermittently productive of sputum which she removes via Yankauer. She reported concern of pt choking on sputum if she is not present at all times. Daughter adamant re: feeding pt. Pt administered small piece of crushed ice chip. He demonstrated immediate aggressive coughing post intake. No further PO trials given HIGH RISK FOR ASPIRATION".     TODAY, pt seen for re-evaluation of swallow function. RN Hedy aware. Noted frequent need for suctioning as pt not able to manage his own secretions. NAD. +3LNC. Edentulous. +Contact precautions; RSV. Encountered in bed, resting. No family present. Provided thermal-tactile stimulation to face with wet wash cloth, which was beneficial in alerting the patient. Eyes remained closed throughout. Provided oral care with use of Yankauer for suction. +Baseline mild-moderate wet congestion. Mild improvement following volitional cough response post oral care. Repositioned fully upright. Offered x1 small, crushed piece of ice chip. Oral phase w/ adequate orientation/reception, adequate ability to strip the bolus from the tsp, prolonged however eventually effective manipulation of bolus leading to suspected premature spillage behind the BoT prior to the swallow response. Subsequently pt with immediate grimace w/ REDDENING IN THE FACE, with delayed WET, EFFORTFUL COUGH RESPONSE.  No additional trials given INABILITY TO PROTECT AIRWAY and HIGH RISK FOR ASPIRATION, as described above. Purposeful proactive rounding reinforced and 5 Ps addressed. Pt left in no distress. Discussion held w/ Yoselin NORRIS and Yaritza following this intervention given current presentation.     Recommendations:   > NPO, with non-oral nutrition/hydration/medications as per GOC  > If family/pt declines NPO recommendations would suggest GOC discussion. If wish for po being aware of the HIGH likelihood of aspiration w/ possible complications such as intubation, would suggest dysphagia 1 and honey thick liquids as conservative textures.   > Stringent oral care with suction to reduce oral pathogens   > Aspiration precautions   > Diligent chest PT   > May benefit from transnasal suctioning given inability to expectorate secretions, as verbally reported  > Head of bed elevation greater than 30 degrees at all times to assist with secretion management   > Service will follow up for re-evaluation in 1-2 days, as per request from team, with poc tbd.

## 2021-08-08 NOTE — PHYSICAL THERAPY INITIAL EVALUATION ADULT - PERTINENT HX OF CURRENT PROBLEM, REHAB EVAL
72yo M Hx of end stage alzheimer's nonverbal at baseline presenting with complaints of SOB. per EMS pt has been coughing for the past several days with worsening SOB today. no fevers at home. not vaccinated for covid. no n/v. dx with RSV infection

## 2021-08-09 PROCEDURE — 71045 X-RAY EXAM CHEST 1 VIEW: CPT | Mod: 26

## 2021-08-09 RX ORDER — ACETYLCYSTEINE 200 MG/ML
4 VIAL (ML) MISCELLANEOUS THREE TIMES A DAY
Refills: 0 | Status: DISCONTINUED | OUTPATIENT
Start: 2021-08-09 | End: 2021-08-10

## 2021-08-09 RX ADMIN — Medication 4 MILLILITER(S): at 21:25

## 2021-08-09 RX ADMIN — CARBAMIDE PEROXIDE 5 DROP(S): 81.86 SOLUTION/ DROPS AURICULAR (OTIC) at 18:00

## 2021-08-09 RX ADMIN — HEPARIN SODIUM 5000 UNIT(S): 5000 INJECTION INTRAVENOUS; SUBCUTANEOUS at 06:07

## 2021-08-09 RX ADMIN — SODIUM CHLORIDE 3 MILLILITER(S): 9 INJECTION INTRAMUSCULAR; INTRAVENOUS; SUBCUTANEOUS at 13:09

## 2021-08-09 RX ADMIN — Medication 3 MILLILITER(S): at 05:07

## 2021-08-09 RX ADMIN — SODIUM CHLORIDE 3 MILLILITER(S): 9 INJECTION INTRAMUSCULAR; INTRAVENOUS; SUBCUTANEOUS at 17:45

## 2021-08-09 RX ADMIN — Medication 3 MILLILITER(S): at 17:45

## 2021-08-09 RX ADMIN — Medication 3 MILLILITER(S): at 23:45

## 2021-08-09 RX ADMIN — SODIUM CHLORIDE 3 MILLILITER(S): 9 INJECTION INTRAMUSCULAR; INTRAVENOUS; SUBCUTANEOUS at 23:45

## 2021-08-09 RX ADMIN — Medication 4 MILLILITER(S): at 13:10

## 2021-08-09 RX ADMIN — HEPARIN SODIUM 5000 UNIT(S): 5000 INJECTION INTRAVENOUS; SUBCUTANEOUS at 17:46

## 2021-08-09 RX ADMIN — SODIUM CHLORIDE 60 MILLILITER(S): 9 INJECTION, SOLUTION INTRAVENOUS at 02:10

## 2021-08-09 RX ADMIN — Medication 3 MILLILITER(S): at 13:09

## 2021-08-09 RX ADMIN — SODIUM CHLORIDE 3 MILLILITER(S): 9 INJECTION INTRAMUSCULAR; INTRAVENOUS; SUBCUTANEOUS at 05:07

## 2021-08-09 RX ADMIN — CARBAMIDE PEROXIDE 5 DROP(S): 81.86 SOLUTION/ DROPS AURICULAR (OTIC) at 05:22

## 2021-08-09 RX ADMIN — Medication 4 MILLILITER(S): at 05:07

## 2021-08-09 NOTE — PROGRESS NOTE ADULT - NSICDXPILOT_GEN_ALL_CORE
[place]

## 2021-08-09 NOTE — PROGRESS NOTE ADULT - ASSESSMENT
70 yo M     hx dementia nonverbal at baseline,  has trouble  ambulating  at baseline, needs help, per  family   . Per   daughter: pt   had   short of breath, productive coughing  with  lethargy.     pt's daughter Sade Roca is HCP  seen by    dr Hubbard/, in the past, pt remains  full code , at  this  time  ct ., right  inguinal hernia  with bladder involvement,  per  urology,   no  intervention needed  Pysch  eval, noted,  pt with prior  h/o  dementia   and  agitation    was on haldol prn , for    delirium/  agitation     ct  head,  marked  atrophy/  elevated     inflammatory markers. from  probable  covid , in  the past      now, admitted  with worsening ams/  ftt    pt at baseline is  non verbal    given pt's mental staus, is at high risk for  aspiration   npo/  iv  fluids.  swallow  eval    s/p  mild tachypnea,  seen by pulm geneva linda   on dvt ppx, /   pt  is not  DNR   ,  with negative  blood/urine   c/s   hypoalbuminemia  per  swallow  eval, pt is  npo,  will  need peg    gi  eval  for  peg. on iv fluids.  was refused  by daughter  daughter wants pt fed,  aware of risks of aspiration/  pna/ death   rpt swallow  eval  on  8/8,  still  recommended  npo   as  daughter is  insistent on pt being fed,  despite  r risks,  trial  of  dysphagia diet  pt  mostly  bed  bound  with functional  quadriplegia,  and per  PT,  needs  at least  2  person assist to merely hold  up pt      pts  dr  is dr ha ambrocio    from: Xray Chest 1 View- PORTABLE-Urgent (04.24.20 @ 07:48) >  IMPRESSION: Diffuse bilateral patchy opacities likely representing atypical/viral pneumonia. COVID 19 is considered.  < end of copied text >    < from: CT Abdomen and Pelvis w/ IV Cont (04.24.20 @ 10:00) >  IMPRESSION:   Right inguinal hernia that involves a portion of the bladder fundus. See above.  No evidence of bowel related inflammation nor obstruction.  Moderate bibasilar parenchymal infiltrates compatible with atypical pneumonia including Covid 19.  The pertinent findings were discussed with Dr. Díaz at the conclusion of today's evaluation  < end of copied text >

## 2021-08-09 NOTE — PROGRESS NOTE ADULT - ASSESSMENT
ASSESSMENT:    advanced dementia with underlying dysphagia admitted with RSV infection complicated by bronchospasm, mucous plugging and hypoxemia - he remains unvaccinated for COVID but has antibodies from a prior infection - CXR is without evidence of pneumonia - ABG reveals resolution of the respiratory acidosis and borderline oxygenation    PLAN/RECOMMENDATIONS:    stable oxygenation on room air  head of bed elevation greater than 30 degrees at all times  NPO until mental status improves   repeat CXR  has completed a 5 day course of ceftriaxone - blood and urine cultures are negative  albuterol/atrovent nebs q6h  mucomyst and hypertonic saline nebs to facilitate mucous clearance  diligent chest PT   oral hygiene and suctioning  acetaminophen for fever  DVT prophylaxis  IV fluids  physical therapy  palliative care team has been called to discuss goals of care with the family    Will follow with you. Plan of care discussed with the patient's son at bedside and the dedicated floor NP. Discharge planning    Ranjan De Guzman MD, [facility]  [phone]  Pulmonary Medicine

## 2021-08-09 NOTE — DISCHARGE NOTE PROVIDER - CARE PROVIDER_API CALL
Kenton Sapp J  INTERNAL MEDICINE  [address] [place] [address]  Phone: [phone]  Fax: [phone]  Follow Up Time:

## 2021-08-09 NOTE — DISCHARGE NOTE PROVIDER - NSDCMRMEDTOKEN_GEN_ALL_CORE_FT
wheelchair: Dx weakness R53.1   acetaminophen 650 mg rectal suppository: 1 suppository(ies) rectal every 4 hours, As needed, Temp greater or equal to 38C (100.4F)  ipratropium-albuterol 0.5 mg-2.5 mg/3 mL inhalation solution: 3 milliliter(s) inhaled every 6 hours

## 2021-08-09 NOTE — DISCHARGE NOTE PROVIDER - NSDCCPCAREPLAN_GEN_ALL_CORE_FT
PRINCIPAL DISCHARGE DIAGNOSIS  Diagnosis: RSV infection  Assessment and Plan of Treatment: - Seen by pulmonary  - Albuterol/atrovent nebs q6h  - mucomyst and hypertonic saline nebs to facilitate mucous clearance  - Diligent chest PT   - Oral hygiene and suctioning        SECONDARY DISCHARGE DIAGNOSES  Diagnosis: Dysphagia  Assessment and Plan of Treatment: - Seen by speech and swallow team, recommends NPO   - Seen by GI for peg evaluation, family refused it     PRINCIPAL DISCHARGE DIAGNOSIS  Diagnosis: RSV infection  Assessment and Plan of Treatment: - Seen by pulmonary  - Albuterol/atrovent nebs q6h  - mucomyst and hypertonic saline nebs to facilitate mucous clearance  - Diligent chest PT   - Oral hygiene and suctioning        SECONDARY DISCHARGE DIAGNOSES  Diagnosis: Dysphagia  Assessment and Plan of Treatment: - Seen by speech and swallow team, recommends NPO   - Seen by GI for peg evaluation, family refused it  - Risk of aspiration assumed by family in accordance with goals of care

## 2021-08-09 NOTE — DISCHARGE NOTE PROVIDER - HOSPITAL COURSE
70 yo M hx dementia nonverbal at baseline,  has trouble  ambulating  at baseline, needs help, per  family. Per daughter: pt had   short of breath, productive coughing  with  lethargy. Seen by /, in the past, pt remains  full code , at  this  time. CT right  inguinal hernia  with bladder involvement,  per  urology,   no  intervention needed. Caroline barbour, noted,  pt with prior  h/o  dementia   and  agitation. Was on haldol prn, for delirium/  agitation  ct head,  marked  atrophy/  elevated inflammatory markers. from  probable  covid , in  the past. Now pt admitted with worsening ams/FTT. Pt at baseline is  non verbal, given pt's mental staus, is at high risk for  aspiration. Seen by swallow eval, they recommends NPO/PEG. GI evaluated  for  peg but it was  refused  by daughter. Daughter wants pt fed, aware of risks of aspiration/  pna/ death. Repeat swallow  eval  on  8/8,  still  recommended  npo. Pt also had mild tachypnea, seen by pulm. Infectious w/u was neg.

## 2021-08-09 NOTE — CHART NOTE - NSCHARTNOTEFT_GEN_A_CORE
Due to patients deconditioning and generalized weakness, secondary to advanced dementia . Patient will require a standard wheelchair. This is necessary to achieve daily tasks and therapies which cannot be achieved with the use of a cane or rolling walker. Patient and family are in agreement with standard wheelchair use at home and assistance will be provided if needed.

## 2021-08-10 VITALS
TEMPERATURE: 97 F | SYSTOLIC BLOOD PRESSURE: 142 MMHG | DIASTOLIC BLOOD PRESSURE: 84 MMHG | OXYGEN SATURATION: 97 % | RESPIRATION RATE: 20 BRPM | HEART RATE: 70 BPM

## 2021-08-10 DIAGNOSIS — R53.2 FUNCTIONAL QUADRIPLEGIA: ICD-10-CM

## 2021-08-10 DIAGNOSIS — Z51.5 ENCOUNTER FOR PALLIATIVE CARE: ICD-10-CM

## 2021-08-10 DIAGNOSIS — R13.10 DYSPHAGIA, UNSPECIFIED: ICD-10-CM

## 2021-08-10 DIAGNOSIS — F03.90 UNSPECIFIED DEMENTIA WITHOUT BEHAVIORAL DISTURBANCE: ICD-10-CM

## 2021-08-10 DIAGNOSIS — Z71.89 OTHER SPECIFIED COUNSELING: ICD-10-CM

## 2021-08-10 PROCEDURE — 85025 COMPLETE CBC W/AUTO DIFF WBC: CPT

## 2021-08-10 PROCEDURE — 83605 ASSAY OF LACTIC ACID: CPT

## 2021-08-10 PROCEDURE — 94640 AIRWAY INHALATION TREATMENT: CPT

## 2021-08-10 PROCEDURE — 85730 THROMBOPLASTIN TIME PARTIAL: CPT

## 2021-08-10 PROCEDURE — 84295 ASSAY OF SERUM SODIUM: CPT

## 2021-08-10 PROCEDURE — 85018 HEMOGLOBIN: CPT

## 2021-08-10 PROCEDURE — 83735 ASSAY OF MAGNESIUM: CPT

## 2021-08-10 PROCEDURE — 82947 ASSAY GLUCOSE BLOOD QUANT: CPT

## 2021-08-10 PROCEDURE — 97162 PT EVAL MOD COMPLEX 30 MIN: CPT

## 2021-08-10 PROCEDURE — 99497 ADVNCD CARE PLAN 30 MIN: CPT | Mod: 25

## 2021-08-10 PROCEDURE — 87086 URINE CULTURE/COLONY COUNT: CPT

## 2021-08-10 PROCEDURE — 82803 BLOOD GASES ANY COMBINATION: CPT

## 2021-08-10 PROCEDURE — 80053 COMPREHEN METABOLIC PANEL: CPT

## 2021-08-10 PROCEDURE — 99285 EMERGENCY DEPT VISIT HI MDM: CPT | Mod: 25

## 2021-08-10 PROCEDURE — 86769 SARS-COV-2 COVID-19 ANTIBODY: CPT

## 2021-08-10 PROCEDURE — 0225U NFCT DS DNA&RNA 21 SARSCOV2: CPT

## 2021-08-10 PROCEDURE — 81001 URINALYSIS AUTO W/SCOPE: CPT

## 2021-08-10 PROCEDURE — 85027 COMPLETE CBC AUTOMATED: CPT

## 2021-08-10 PROCEDURE — 84132 ASSAY OF SERUM POTASSIUM: CPT

## 2021-08-10 PROCEDURE — 96374 THER/PROPH/DIAG INJ IV PUSH: CPT

## 2021-08-10 PROCEDURE — 92610 EVALUATE SWALLOWING FUNCTION: CPT

## 2021-08-10 PROCEDURE — 71045 X-RAY EXAM CHEST 1 VIEW: CPT

## 2021-08-10 PROCEDURE — 99223 1ST HOSP IP/OBS HIGH 75: CPT

## 2021-08-10 PROCEDURE — 85610 PROTHROMBIN TIME: CPT

## 2021-08-10 PROCEDURE — 87040 BLOOD CULTURE FOR BACTERIA: CPT

## 2021-08-10 PROCEDURE — 80048 BASIC METABOLIC PNL TOTAL CA: CPT

## 2021-08-10 PROCEDURE — 82435 ASSAY OF BLOOD CHLORIDE: CPT

## 2021-08-10 PROCEDURE — 85014 HEMATOCRIT: CPT

## 2021-08-10 PROCEDURE — 92526 ORAL FUNCTION THERAPY: CPT

## 2021-08-10 PROCEDURE — 82330 ASSAY OF CALCIUM: CPT

## 2021-08-10 RX ORDER — GLYCERIN ADULT
2 SUPPOSITORY, RECTAL RECTAL ONCE
Refills: 0 | Status: COMPLETED | OUTPATIENT
Start: 2021-08-10 | End: 2021-08-10

## 2021-08-10 RX ORDER — IPRATROPIUM/ALBUTEROL SULFATE 18-103MCG
3 AEROSOL WITH ADAPTER (GRAM) INHALATION
Qty: 0 | Refills: 0 | DISCHARGE
Start: 2021-08-10

## 2021-08-10 RX ORDER — ACETAMINOPHEN 500 MG
1 TABLET ORAL
Qty: 0 | Refills: 0 | DISCHARGE
Start: 2021-08-10

## 2021-08-10 RX ORDER — IPRATROPIUM/ALBUTEROL SULFATE 18-103MCG
3 AEROSOL WITH ADAPTER (GRAM) INHALATION
Qty: 360 | Refills: 0
Start: 2021-08-10 | End: 2021-09-08

## 2021-08-10 RX ADMIN — CARBAMIDE PEROXIDE 5 DROP(S): 81.86 SOLUTION/ DROPS AURICULAR (OTIC) at 07:19

## 2021-08-10 RX ADMIN — SODIUM CHLORIDE 60 MILLILITER(S): 9 INJECTION, SOLUTION INTRAVENOUS at 05:36

## 2021-08-10 RX ADMIN — Medication 2 SUPPOSITORY(S): at 04:45

## 2021-08-10 RX ADMIN — Medication 4 MILLILITER(S): at 06:11

## 2021-08-10 RX ADMIN — SODIUM CHLORIDE 3 MILLILITER(S): 9 INJECTION INTRAMUSCULAR; INTRAVENOUS; SUBCUTANEOUS at 05:37

## 2021-08-10 RX ADMIN — SODIUM CHLORIDE 3 MILLILITER(S): 9 INJECTION INTRAMUSCULAR; INTRAVENOUS; SUBCUTANEOUS at 11:42

## 2021-08-10 RX ADMIN — Medication 3 MILLILITER(S): at 05:36

## 2021-08-10 RX ADMIN — Medication 3 MILLILITER(S): at 11:42

## 2021-08-10 RX ADMIN — HEPARIN SODIUM 5000 UNIT(S): 5000 INJECTION INTRAVENOUS; SUBCUTANEOUS at 05:36

## 2021-08-10 RX ADMIN — Medication 4 MILLILITER(S): at 13:42

## 2021-08-10 NOTE — PROGRESS NOTE ADULT - ASSESSMENT
70 yo M     hx dementia nonverbal at baseline,  has trouble  ambulating  at baseline, needs help, per  family   . Per   daughter: pt   had   short of breath, productive coughing  with  lethargy.     pt's daughter Sade Roca is HCP  seen by    dr Hubbard/, in the past, pt remains  full code , at  this  time  ct ., right  inguinal hernia  with bladder involvement,  per  urology,   no  intervention needed  Pysch  eval, noted,  pt with prior  h/o  dementia   and  agitation    was on haldol prn , for    delirium/  agitation     ct  head,  marked  atrophy/  elevated     inflammatory markers. from  probable  covid , in  the past      now, admitted  with worsening ams/  ftt    pt at baseline is  non verbal    given pt's mental staus, is at high risk for  aspiration   npo/  iv  fluids.  swallow  eval    s/p  mild tachypnea,  seen by pulm geneva linda   on dvt ppx, /   pt  is not  DNR     with negative  blood/urine   c/s   hypoalbuminemia  per  swallow  eval, pt is  npo,  will  need peg    gi  eval  for  peg. on iv fluids.  was refused  by daughter  daughter wants pt fed,  aware of risks of aspiration/  pna/ death   rpt swallow  eval  on  8/8,  still  recommended  npo   as  daughter is  insistent on pt being fed,  despite   risks,  trial  of  dysphagia diet  pt  mostly  bed  bound  with functional  quadriplegia,  and per  PT,  needs  at least  2  person assist to merely hold  up pt  palliative care  to  assist      pts  dr  is dr ha ambrocio    from: Xray Chest 1 View- PORTABLE-Urgent (04.24.20 @ 07:48) >  IMPRESSION: Diffuse bilateral patchy opacities likely representing atypical/viral pneumonia. COVID 19 is considered.  < end of copied text >    < from: CT Abdomen and Pelvis w/ IV Cont (04.24.20 @ 10:00) >  IMPRESSION:   Right inguinal hernia that involves a portion of the bladder fundus. See above.  No evidence of bowel related inflammation nor obstruction.  Moderate bibasilar parenchymal infiltrates compatible with atypical pneumonia including Covid 19.  The pertinent findings were discussed with Dr. Díaz at the conclusion of today's evaluation  < end of copied text >

## 2021-08-10 NOTE — CONSULT NOTE ADULT - PROBLEM SELECTOR RECOMMENDATION 5
Will sign off.        Ja Mina MD   Geriatrics and Palliative Care (GAP) Consult Service    of Geriatric and Palliative Medicine  [facility]      Please page the following number for clinical matters between the hours of 9 am and 5 pm from Monday through Friday : [phone]    After 5pm and on weekends, please see the contact information below:    In the event of newly developing, evolving, or worsening symptoms, please contact the Palliative Medicine team via pager (if the patient is at [facility] #[phone] or if the patient is at [facility] #[phone]) The Geriatric and Palliative Medicine service has coverage 24 hours a day/ 7 days a week to provide medical recommendations regarding symptom management needs via telephone

## 2021-08-10 NOTE — CONSULT NOTE ADULT - REASON FOR ADMISSION
sob/ htn/ change of mental status

## 2021-08-10 NOTE — CONSULT NOTE ADULT - SUBJECTIVE AND OBJECTIVE BOX
HPI:  70yo M with end stage alzheimer's nonverbal at baseline presenting with complaints of SOB. per EMS pt has been coughing for the past several days with worsening SOB . no fevers at home. not vaccinated for covid. no n/v. As per pulmonary notes dated 8/9, "with underlying dysphagia admitted with RSV infection complicated by bronchospasm, mucous plugging and hypoxemia - he remains unvaccinated for COVID but has antibodies from a prior infection - CXR is without evidence of pneumonia - ABG reveals resolution of the respiratory acidosis and borderline oxygenation." Palliative care was called for GOC and ACP.       PERTINENT PM/SXH:   Dementia    FAMILY HISTORY: no history of dementia     ITEMS NOT CHECKED ARE NOT PRESENT    SOCIAL HISTORY:   Significant other/partner[ ]  Children[ ]  Sikh/Spirituality:  Substance hx:  [ ]   Tobacco hx:  [ ]   Alcohol hx: [ ]   Home Opioid hx:  [ ] I-Stop Reference No:  Living Situation: [ ]Home  [ ]Long term care  [ ]Rehab [ ]Other    As per care coordination notes: Pt has hx of end stage alzheimers disease and is non  verbal at [facility].  Daughter Sade  identifies as caregiver,  available to for CM interview.  CM introduced self and  outlined role, Sade  verbalizes understanding, contact information provided.  Pt admitted on 8/4  +RSV, PNA, increased secretions.  Pt has history of Alzheimers and HTN.  Demographics confirmed, PTA patient resides with family who provide 24/7 care  and assist pt with all ADL.  There are 3 entryway steps and 10 steps to 2nd  floor where pt's room is located.  Pt owns hospital bed and wheelchair.  Pt is  currently NPO pending speech and swallow eval, receiving IV ceftriaxone, and  pending PT eval.  Kory Stuart states that NANCY is not an option and that upon  discharge her plan is to take pt home and resume full care of patient with  family members to assist.    ADVANCE DIRECTIVES:    DNR  MOLST  [ ]  Living Will  [ ]   DECISION MAKER(s):  [ ] Health Care Proxy(s)  [ ] Surrogate(s)  [ ] Guardian           Name(s): Phone Number(s):    BASELINE (I)ADL(s) (prior to admission):  [place]: [ ]Total  [ ] Moderate [ ]Dependent    Allergies    No Known Allergies    Intolerances    MEDICATIONS  (STANDING):  acetylcysteine 20%  Inhalation 4 milliLiter(s) Inhalation three times a day  albuterol/ipratropium for Nebulization 3 milliLiter(s) Nebulizer every 6 hours  carbamide peroxide Otic Solution 5 Drop(s) Both Ears two times a day  heparin   Injectable 5000 Unit(s) SubCutaneous every 12 hours  lactated ringers. 1000 milliLiter(s) (60 mL/Hr) IV Continuous <Continuous>  sodium chloride 3%  Inhalation 3 milliLiter(s) Inhalation every 6 hours    MEDICATIONS  (PRN):  acetaminophen  Suppository .. 650 milliGRAM(s) Rectal every 4 hours PRN Temp greater or equal to 38C (100.4F)    PRESENT SYMPTOMS: [ ]Unable to obtain due to poor mentation   Source if other than patient:  [ ]Family   [ ]Team     Pain: [ ]yes [ ]no  QOL impact -   Location -                    Aggravating factors -  Quality -  Radiation -  Timing-  Severity (0-10 scale):  Minimal acceptable level (0-10 scale):     CPOT:    https://www.[facility].org/getattachment/tpo22e20-2f2y-7j9p-7y9s-4608b7054s4t/Critical-Care-Pain-Observation-Tool-(CPOT)      PAIN AD Score:     http://geriatrictoolkit.[facility]/cog/painad.pdf (press ctrl +  left click to view)    Dyspnea:                           [ ]Mild [ ]Moderate [ ]Severe  Anxiety:                             [ ]Mild [ ]Moderate [ ]Severe  Fatigue:                             [ ]Mild [ ]Moderate [ ]Severe  Nausea:                             [ ]Mild [ ]Moderate [ ]Severe  Loss of appetite:              [ ]Mild [ ]Moderate [ ]Severe  Constipation:                    [ ]Mild [ ]Moderate [ ]Severe    Other Symptoms:  [ ]All other review of systems negative     Palliative Performance Status Version 2:         %    http://npcrc.org/files/news/palliative_performance_scale_ppsv2.pdf  PHYSICAL EXAM:  Vital Signs Last 24 Hrs  T(C): 36.4 (10 Aug 2021 04:45), Max: 37.1 (09 Aug 2021 15:40)  T(F): 97.6 (10 Aug 2021 04:45), Max: 98.8 (09 Aug 2021 19:42)  HR: 61 (10 Aug 2021 04:45) (59 - 87)  BP: 158/60 (10 Aug 2021 04:45) (131/67 - 158/60)  BP(mean): --  RR: 24 (10 Aug 2021 04:45) (18 - 24)  SpO2: 96% (10 Aug 2021 04:45) (95% - 96%) I&O's Summary    09 Aug 2021 07:01  -  10 Aug 2021 07:00  --------------------------------------------------------  IN: 1440 mL / OUT: 0 mL / NET: 1440 mL      GENERAL:  [ ]Alert  [ ]Oriented x   [ ]Lethargic  [ ]Cachexia  [ ]Unarousable  [ ]Verbal  [ ]Non-Verbal  Behavioral:   [ ] Anxiety  [ ] Delirium [ ] Agitation [ ] Other  HEENT:  [ ]Normal   [ ]Dry mouth   [ ]ET Tube/Trach  [ ]Oral lesions  PULMONARY:   [ ]Clear [ ]Tachypnea  [ ]Audible excessive secretions   [ ]Rhonchi        [ ]Right [ ]Left [ ]Bilateral  [ ]Crackles        [ ]Right [ ]Left [ ]Bilateral  [ ]Wheezing     [ ]Right [ ]Left [ ]Bilateral  [ ]Diminished breath sounds [ ]right [ ]left [ ]bilateral  CARDIOVASCULAR:    [ ]Regular [ ]Irregular [ ]Tachy  [ ]Juan [ ]Murmur [ ]Other  GASTROINTESTINAL:  [ ]Soft  [ ]Distended   [ ]+BS  [ ]Non tender [ ]Tender  [ ]PEG [ ]OGT/ NGT  Last BM:   GENITOURINARY:  [ ]Normal [ ] Incontinent   [ ]Oliguria/Anuria   [ ]Olea  MUSCULOSKELETAL:   [ ]Normal   [ ]Weakness  [ ]Bed/Wheelchair bound [ ]Edema  NEUROLOGIC:   [ ]No focal deficits  [ ]Cognitive impairment  [ ]Dysphagia [ ]Dysarthria [ ]Paresis [ ]Other   SKIN:   [ ]Normal    [ ]Rash  [ ]Pressure ulcer(s)       Present on admission [ ]y [ ]n    CRITICAL CARE:  [ ] Shock Present  [ ]Septic [ ]Cardiogenic [ ]Neurologic [ ]Hypovolemic  [ ]  Vasopressors [ ]  Inotropes   [ ]Respiratory failure present [ ]Mechanical ventilation [ ]Non-invasive ventilatory support [ ]High flow    [ ]Acute  [ ]Chronic [ ]Hypoxic  [ ]Hypercarbic [ ]Other  [ ]Other organ failure     LABS:            RADIOLOGY & ADDITIONAL STUDIES:    c< from: Xray Chest 1 View- PORTABLE-Urgent (Xray Chest 1 View- PORTABLE-Urgent .) (08.09.21 @ 14:22) >  IMPRESSION:  Clear lungs.    --- End of Report ---              ROBBY MELCHOR MD; Resident Radiologist  This document has been electronically signed.  CARLYLE ABDUL MD; Attending Radiologist  This document has been electronically signed.Aug  9 2021  5:28PM  EXAM:  XR CHEST PORTABLE URGENT 1V                            PROCEDURE DATE:  08/09/2021      < end of copied text >  < from: CT Head No Cont (04.27.20 @ 16:34) >    EXAM:  CT BRAIN                            PROCEDURE DATE:  04/27/2020            INTERPRETATION:    Clinical Indication: Altered mental status, likely Covid positive    5mm axial sections of the brain were obtained from base to vertex, without theintravenous administration of contrast material. Coronal and sagittal computer generated reconstructed views are available.    No prior brain imaging is available for comparison.    Moderate to marked and atrophy is identified with ventricular and sulcal prominence. Small vessel white matter ischemic changes are noted. There is no hemorrhage. There is no midline shift.    IMPRESSION: Moderate to marked and atrophy.                    MARY REYES MD, ATTENDING RADIOLOGIST  This document has been electronically signed. Apr 27 2020  4:37PM        < end of copied text >      PROTEIN CALORIE MALNUTRITION PRESENT: [ ]mild [ ]moderate [ ]severe [ ]underweight [ ]morbid obesity  https://www.andeal.org/vault/2440/web/files/ONC/Table_Clinical%20Characteristics%20to%20Document%20Malnutrition-White%20JV%20et%20al%202012.pdf    Height (cm): 182.9 (08-04-21 @ 02:10), 182.9 (04-07-21 @ 20:26)  Weight (kg): 78 (08-08-21 @ 00:31), 81.6 (04-07-21 @ 20:26)  BMI (kg/m2): 23.3 (08-08-21 @ 00:31), 24.4 (08-04-21 @ 02:10), 24.4 (04-07-21 @ 20:26)    [ ]PPSV2 < or = to 30% [ ]significant weight loss  [ ]poor nutritional intake  [ ]anasarca      [ ]Artificial Nutrition      REFERRALS:   [ ]Chaplaincy  [ ]Hospice  [ ]Child Life  [ ]Social Work  [ ]Case management [ ]Holistic Therapy     Goals of Care Document:  HPI:  72yo M with end stage alzheimer's nonverbal at baseline presenting with complaints of SOB. per EMS pt has been coughing for the past several days with worsening SOB . no fevers at home. not vaccinated for covid. no n/v. As per pulmonary notes dated 8/9, "with underlying dysphagia admitted with RSV infection complicated by bronchospasm, mucous plugging and hypoxemia - he remains unvaccinated for COVID but has antibodies from a prior infection - CXR is without evidence of pneumonia - ABG reveals resolution of the respiratory acidosis and borderline oxygenation." Palliative care was called for GOC and ACP.     8/10 the patient was seen and examined with his daughter (Sade) by his bedside. He was constantly coughing and with clear incapacity to bring up his phlegm. However, at least during my eval, he was not on any acute respiratory distress.       PERTINENT PM/SXH:   Dementia    FAMILY HISTORY: no history of dementia     ITEMS NOT CHECKED ARE NOT PRESENT    SOCIAL HISTORY:   Significant other/partner[x ]  Children[x ] 3  Gnosticist/Spirituality:  Substance hx:  [ ]   Tobacco hx:  [ ]   Alcohol hx: [ ]   Home Opioid hx:  [ ] I-Stop Reference No:  Living Situation: [x ]Home  [ ]Long term care  [ ]Rehab [ ]Other    As per care coordination notes: Pt has hx of end stage alzheimers disease and is non  verbal at [facility].  Daughter Sade  identifies as caregiver,  available to for CM interview.  CM introduced self and  outlined role, Sade  verbalizes understanding, contact information provided.  Pt admitted on 8/4  +RSV, PNA, increased secretions.  Pt has history of Alzheimers and HTN.  Demographics confirmed, PTA patient resides with family who provide 24/7 care  and assist pt with all ADL.  There are 3 entryway steps and 10 steps to 2nd  floor where pt's room is located.  Pt owns hospital bed and wheelchair.  Pt is  currently NPO pending speech and swallow eval, receiving IV ceftriaxone, and  pending PT eval.  Daughter Sade states that NANCY is not an option and that upon  discharge her plan is to take pt home and resume full care of patient with  family members to assist.    ADVANCE DIRECTIVES:    DNR  MOLST  [ ]  Living Will  [ ]   DECISION MAKER(s):  [ x] Health Care Proxy(s)  [ ] Surrogate(s)  [ ] Guardian           Name(s): Phone Number(s): Sade Reich.     BASELINE (I)ADL(s) (prior to admission):  [place]: [ ]Total  [ ] Moderate [x ]Dependent    Allergies    No Known Allergies    Intolerances    MEDICATIONS  (STANDING):  acetylcysteine 20%  Inhalation 4 milliLiter(s) Inhalation three times a day  albuterol/ipratropium for Nebulization 3 milliLiter(s) Nebulizer every 6 hours  carbamide peroxide Otic Solution 5 Drop(s) Both Ears two times a day  heparin   Injectable 5000 Unit(s) SubCutaneous every 12 hours  lactated ringers. 1000 milliLiter(s) (60 mL/Hr) IV Continuous <Continuous>  sodium chloride 3%  Inhalation 3 milliLiter(s) Inhalation every 6 hours    MEDICATIONS  (PRN):  acetaminophen  Suppository .. 650 milliGRAM(s) Rectal every 4 hours PRN Temp greater or equal to 38C (100.4F)    PRESENT SYMPTOMS: [x ]Unable to obtain due to poor mentation   Source if other than patient:  [ ]Family   [ ]Team     Pain: [ ]yes [ x]no  QOL impact -   Location -                    Aggravating factors -  Quality -  Radiation -  Timing-  Severity (0-10 scale):  Minimal acceptable level (0-10 scale):     CPOT:    https://www.Psychiatric.org/getattachment/ifh15y69-7q5v-0h9o-3n3f-2480k0445r1a/Critical-Care-Pain-Observation-Tool-(CPOT)      PAIN AD Score: 0    http://geriatrictoolkit.[place].[facility]/cog/painad.pdf (press ctrl +  left click to view)    Dyspnea:                           [ ]Mild [ ]Moderate [ ]Severe  Anxiety:                             [ ]Mild [ ]Moderate [ ]Severe  Fatigue:                             [ ]Mild [ ]Moderate [ ]Severe  Nausea:                             [ ]Mild [ ]Moderate [ ]Severe  Loss of appetite:              [ ]Mild [ ]Moderate [ ]Severe  Constipation:                    [ ]Mild [ ]Moderate [ ]Severe    Other Symptoms:  [ ]All other review of systems negative     Palliative Performance Status Version 2:    20       %    http://npcrc.org/files/news/palliative_performance_scale_ppsv2.pdf  PHYSICAL EXAM:  Vital Signs Last 24 Hrs  T(C): 36.4 (10 Aug 2021 04:45), Max: 37.1 (09 Aug 2021 15:40)  T(F): 97.6 (10 Aug 2021 04:45), Max: 98.8 (09 Aug 2021 19:42)  HR: 61 (10 Aug 2021 04:45) (59 - 87)  BP: 158/60 (10 Aug 2021 04:45) (131/67 - 158/60)  BP(mean): --  RR: 24 (10 Aug 2021 04:45) (18 - 24)  SpO2: 96% (10 Aug 2021 04:45) (95% - 96%) I&O's Summary    09 Aug 2021 07:01  -  10 Aug 2021 07:00  --------------------------------------------------------  IN: 1440 mL / OUT: 0 mL / NET: 1440 mL      GENERAL:  [ ]Alert  [ ]Oriented x   [ x]Lethargic  [ ]Cachexia  [ ]Unarousable  [ ]Verbal  [ ]Non-Verbal  Behavioral:   [ ] Anxiety  [ ] Delirium [ ] Agitation [ ] Other  HEENT:  [ ]Normal   [ ]Dry mouth   [ ]ET Tube/Trach  [ ]Oral lesions [x] gurgling breath sounds   PULMONARY:   [ ]Clear [ ]Tachypnea  [ ]Audible excessive secretions   [ ]Rhonchi        [ ]Right [ ]Left [ ]Bilateral  [ ]Crackles        [ ]Right [ ]Left [ ]Bilateral  [ ]Wheezing     [ ]Right [ ]Left [ ]Bilateral  [x ]Diminished breath sounds [ ]right [ ]left [ x]bilateral  CARDIOVASCULAR:    [x ]Regular [ ]Irregular [ ]Tachy  [ ]Juan [ ]Murmur [ ]Other  GASTROINTESTINAL:  [x ]Soft  [ ]Distended   [x ]+BS  [ ]Non tender [ ]Tender  [ ]PEG [ ]OGT/ NGT  Last BM:   GENITOURINARY:  [ ]Normal [ x] Incontinent   [ ]Oliguria/Anuria   [ ]Olea  MUSCULOSKELETAL:   [ ]Normal   [x ]Weakness  [ ]Bed/Wheelchair bound [ ]Edema  NEUROLOGIC:   [ ]No focal deficits  [x] Advanced Cognitive impairment. FAST 7d  [ ]Dysphagia [ ]Dysarthria [ ]Paresis [ ]Other   SKIN:   [x ]Normal    [ ]Rash  [ ]Pressure ulcer(s)       Present on admission [ ]y [ ]n    CRITICAL CARE:  [ ] Shock Present  [ ]Septic [ ]Cardiogenic [ ]Neurologic [ ]Hypovolemic  [ ]  Vasopressors [ ]  Inotropes   [ ]Respiratory failure present [ ]Mechanical ventilation [ ]Non-invasive ventilatory support [ ]High flow    [ ]Acute  [ ]Chronic [ ]Hypoxic  [ ]Hypercarbic [ ]Other  [ ]Other organ failure     LABS:  No new labs           RADIOLOGY & ADDITIONAL STUDIES:    c< from: Xray Chest 1 View- PORTABLE-Urgent (Xray Chest 1 View- PORTABLE-Urgent .) (08.09.21 @ 14:22) >  IMPRESSION:  Clear lungs.    --- End of Report ---              ROBBY MELCHOR MD; Resident Radiologist  This document has been electronically signed.  CARLYLE ABDUL MD; Attending Radiologist  This document has been electronically signed.Aug  9 2021  5:28PM  EXAM:  XR CHEST PORTABLE URGENT 1V                            PROCEDURE DATE:  08/09/2021      < end of copied text >  < from: CT Head No Cont (04.27.20 @ 16:34) >    EXAM:  CT BRAIN                            PROCEDURE DATE:  04/27/2020            INTERPRETATION:    Clinical Indication: Altered mental status, likely Covid positive    5mm axial sections of the brain were obtained from base to vertex, without theintravenous administration of contrast material. Coronal and sagittal computer generated reconstructed views are available.    No prior brain imaging is available for comparison.    Moderate to marked and atrophy is identified with ventricular and sulcal prominence. Small vessel white matter ischemic changes are noted. There is no hemorrhage. There is no midline shift.    IMPRESSION: Moderate to marked and atrophy.                    MARY REYES MD, ATTENDING RADIOLOGIST  This document has been electronically signed. Apr 27 2020  4:37PM        < end of copied text >      PROTEIN CALORIE MALNUTRITION PRESENT: [ ]mild [ ]moderate [ ]severe [ ]underweight [ ]morbid obesity  https://www.andeal.org/vault/2440/web/files/ONC/Table_Clinical%20Characteristics%20to%20Document%20Malnutrition-White%20JV%20et%20al%202012.pdf    Height (cm): 182.9 (08-04-21 @ 02:10), 182.9 (04-07-21 @ 20:26)  Weight (kg): 78 (08-08-21 @ 00:31), 81.6 (04-07-21 @ 20:26)  BMI (kg/m2): 23.3 (08-08-21 @ 00:31), 24.4 (08-04-21 @ 02:10), 24.4 (04-07-21 @ 20:26)    [ ]PPSV2 < or = to 30% [ ]significant weight loss  [ ]poor nutritional intake  [ ]anasarca      [ ]Artificial Nutrition      REFERRALS:   [ ]Chaplaincy  [ ]Hospice  [ ]Child Life  [ ]Social Work  [ ]Case management [ ]Holistic Therapy     Goals of Care Document:

## 2021-08-10 NOTE — CONSULT NOTE ADULT - CONSULT REASON
RSV infection; bronchospasm; mucous plugging; hypoxemia; dyspnea
ams
Goals of care, advanced care planning, and resources in a patient with advanced dementia

## 2021-08-10 NOTE — PROGRESS NOTE ADULT - SUBJECTIVE AND OBJECTIVE BOX
febrile    REVIEW OF SYSTEMS:  GEN: no fever,    no chills  RESP: no SOB,   no cough  CVS: no chest pain,   no palpitations  GI: no abdominal pain,   no nausea,   no vomiting,   no constipation,   no diarrhea  : no dysuria,   no frequency  NEURO: no headache,   no dizziness  PSYCH: no depression,   not anxious  Derm : no rash    MEDICATIONS  (STANDING):  acetylcysteine 20%  Inhalation 4 milliLiter(s) Inhalation three times a day  albuterol/ipratropium for Nebulization 3 milliLiter(s) Nebulizer every 6 hours  cefTRIAXone   IVPB 1000 milliGRAM(s) IV Intermittent every 24 hours  cefTRIAXone   IVPB      heparin   Injectable 5000 Unit(s) SubCutaneous every 12 hours  lactated ringers. 1000 milliLiter(s) (60 mL/Hr) IV Continuous <Continuous>  potassium chloride  10 mEq/100 mL IVPB 10 milliEquivalent(s) IV Intermittent every 1 hour  sodium chloride 3%  Inhalation 3 milliLiter(s) Inhalation every 6 hours    MEDICATIONS  (PRN):  acetaminophen  Suppository .. 650 milliGRAM(s) Rectal every 4 hours PRN Temp greater or equal to 38C (100.4F)      Vital Signs Last 24 Hrs  T(C): 37.8 (06 Aug 2021 07:55), Max: 38.9 (06 Aug 2021 00:13)  T(F): 100 (06 Aug 2021 07:55), Max: 102.1 (06 Aug 2021 00:13)  HR: 90 (06 Aug 2021 07:55) (81 - 94)  BP: 140/74 (06 Aug 2021 07:55) (105/64 - 154/84)  BP(mean): --  RR: 20 (06 Aug 2021 07:55) (18 - 20)  SpO2: 95% (06 Aug 2021 07:55) (92% - 96%)  CAPILLARY BLOOD GLUCOSE        I&O's Summary    05 Aug 2021 07:01  -  06 Aug 2021 07:00  --------------------------------------------------------  IN: 1440 mL / OUT: 1350 mL / NET: 90 mL        PHYSICAL EXAM:  HEAD:  Atraumatic, Normocephalic  NECK: Supple, No   JVD  CHEST/LUNG:   no     rales,     no,    rhonchi  HEART: Regular rate and rhythm;         murmur  ABDOMEN: Soft, Nontender, ;   EXTREMITIES:    no    edema  NEUROLOGY:  alert    LABS:                        13.5   6.46  )-----------( 127      ( 06 Aug 2021 06:59 )             42.1     08-06    133<L>  |  97  |  8   ----------------------------<  88  3.4<L>   |  22  |  0.61    Ca    8.8      06 Aug 2021 06:59    TPro  5.6<L>  /  Alb  2.9<L>  /  TBili  0.6  /  DBili  x   /  AST  16  /  ALT  15  /  AlkPhos  73  08-06              ABG - ( 05 Aug 2021 06:43 )  pH, Arterial: 7.46  pH, Blood: x     /  pCO2: 38    /  pO2: 58    / HCO3: 27    / Base Excess: 3.5   /  SaO2: 91                            Consultant(s) Notes Reviewed:      Care Discussed with Consultants/Other Providers:    
[facility] - [facility] - PROGRESS NOTE    CHIEF COMPLAINT: RSV infection; bronchospasm; mucous plugging; hypoxemia; dyspnea    INTERVAL HISTORY: minimally more responsive - daughter reports that he knows he is in the hospital and recognizes her; no shortness of breath or hypoxemia on a nasal canula @ 4lpm; ongoing cough with difficulty expectorating sputum; ongoing chest congestion and wheeze; no fevers, chills or sweats; no chest pain/pressure or palpitations; NPO    REVIEW OF SYSTEMS:  Constitutional: As per interval history  HEENT: Within normal limits  CV: As per interval history  Resp: As per interval history  GI: Within normal limits   : Within normal limits  Musculoskeletal: Within normal limits  Skin: Within normal limits  Neurological: Within normal limits  Psychiatric: Within normal limits  Endocrine: Within normal limits  Hematologic/Lymphatic: Within normal limits  Allergic/Immunologic: Within normal limits    [x] Unable to assess ROS because of dementia    MEDICATIONS:     Pulmonary "  acetylcysteine 20%  Inhalation 4 milliLiter(s) Inhalation three times a day  albuterol/ipratropium for Nebulization 3 milliLiter(s) Nebulizer every 6 hours  sodium chloride 3%  Inhalation 3 milliLiter(s) Inhalation every 6 hours    Anti-microbials:  cefTRIAXone   IVPB 1000 milliGRAM(s) IV Intermittent every 24 hours  cefTRIAXone   IVPB        Cardiovascular:    Other:  heparin   Injectable 5000 Unit(s) SubCutaneous every 12 hours  lactated ringers. 1000 milliLiter(s) IV Continuous <Continuous>    MEDICATIONS  (PRN):  acetaminophen  Suppository .. 650 milliGRAM(s) Rectal every 4 hours PRN Temp greater or equal to 38C (100.4F)        OBJECTIVE:    I&O's Detail    04 Aug 2021 07:01  -  05 Aug 2021 07:00  --------------------------------------------------------  IN:    Lactated Ringers: 120 mL  Total IN: 120 mL    OUT:    Incontinent per Condom Catheter (mL): 650 mL  Total OUT: 650 mL    Total NET: -530 mL    PHYSICAL EXAM:       ICU Vital Signs Last 24 Hrs  T(C): 37 (05 Aug 2021 09:02), Max: 37.6 (05 Aug 2021 05:09)  T(F): 98.6 (05 Aug 2021 09:02), Max: 99.7 (05 Aug 2021 05:09)  HR: 86 (05 Aug 2021 09:02) (77 - 91)  BP: 128/74 (05 Aug 2021 09:02) (113/68 - 188/75)  BP(mean): --  ABP: --  ABP(mean): --  RR: 18 (05 Aug 2021 09:02) (18 - 20)  SpO2: 96% (05 Aug 2021 09:02) (94% - 100%) on 3lpm nasal canula     General: Lethargic. Not cooperative. No distress. Appears stated age 	  HEENT: Atraumatic. Normocephalic. Anicteric. Normal oral mucosa. PERRL. EOMI.  Neck: Supple. Trachea midline. Thyroid without enlargement/tenderness/nodules. No carotid bruit. No JVD.	  Cardiovascular: Regular rate and rhythm. S1 S2 normal. II/VI systolic murmur  Respiratory: Respirations unlabored. Diffuse rhonchi and wheeze. No curvature.  Abdomen: Soft. Non-tender. Non-distended. No organomegaly. No masses. Normal bowel sounds.  Extremities: Warm to touch. No clubbing or cyanosis. No pedal edema. Heel pads.  Pulses: 2+ peripheral pulses all extremities.	  Skin: Normal skin color. No rashes or lesions. No ecchymoses. No cyanosis. Warm to touch.  Lymph Nodes: Cervical, supraclavicular and axillary nodes normal  Neurological: Lethargic. Moving all extremities. Intermittent twitching movements. A and O x 0  Psychiatry: Calm    LABS:                          13.0   8.72  )-----------( 122      ( 05 Aug 2021 07:16 )             39.0     CBC    WBC  8.72 <==, 9.86 <==    Hemoglobin  13.0 <<==, 15.2 <<==    Hematocrit  39.0 <==, 46.3 <==    Platelets  122 <==, 153 <==      136  |  100  |  6<L>  ----------------------------<  109<H>    08-05  3.6   |  22  |  0.50      LYTES    sodium  136 <==, 136 <==    potassium   3.6 <==, 3.9 <==    chloride  100 <==, 103 <==    carbon dioxide  22 <==, 25 <==    =============================================================================================  RENAL FUNCTION:    Creatinine:   0.50  <<==, 0.65  <<==    BUN:   6 <==, 10 <==    ============================================================================================    calcium   8.8 <==, 9.4 <==    ============================================================================================  LFTs    AST:   13 <== , 17 <==     ALT:  15  <== , 24  <==     AP:  70  <=, 93  <=    Bili:  0.6  <=, 0.4  <=      PT/INR - ( 04 Aug 2021 03:20 )   PT: 12.2 sec;   INR: 1.02 ratio       PTT - ( 04 Aug 2021 03:20 )  PTT:26.7 sec    ABG - ( 05 Aug 2021 06:43 )  pH: 7.46  /  pCO2: 38    /  pO2: 58    / HCO3: 27    / Base Excess: 3.5   /  SaO2: 91        Venous Blood Gas:   @ 03:20  7.33/61//  VBG Lactate: 1.6    MICROBIOLOGY:     Respiratory Viral Panel with COVID-19 by [facility] (21 @ 03:16)   Rapid RVP Result: Detected   SARS-CoV-2: NotDetec  Resp Syncytial Virus (RapRVP): Detected     Urinalysis Basic - ( 04 Aug 2021 03:18 )    Color: Light Yellow / Appearance: Clear / S.023 / pH: x  Gluc: x / Ketone: Negative  / Bili: Negative / Urobili: Negative   Blood: x / Protein: Negative / Nitrite: Negative   Leuk Esterase: Negative / RBC: 6 /hpf / WBC 1 /HPF   Sq Epi: x / Non Sq Epi: 0 /hpf / Bacteria: Negative    Culture - Urine (21 @ 04:45)   Specimen Source: Clean Catch Clean Catch (Midstream)   Culture Results:   No growth     Culture - Blood (21 @ 05:57)   Specimen Source: .Blood Blood-Peripheral   Culture Results:   No growth to date.     Culture - Blood (21 @ 05:57)   Specimen Source: .Blood Blood-Peripheral   Culture Results:   No growth to date.     RADIOLOGY:  [x ] Chest radiographs reviewed and interpreted by me    < from: Xray Chest 1 View- PORTABLE-Urgent (21 @ 04:41) >    EXAM:  XR CHEST PORTABLE URGENT 1V                          PROCEDURE DATE:  2021        INTERPRETATION:  CLINICAL INFORMATION: Sepsis.    TECHNIQUE: Frontal radiograph of the chest.    COMPARISON: Chest x-ray 2020    FINDINGS:    LUNGS: Poor inspiratory effort. The lungs are clear. Left hemidiaphragm elevation.    PLEURA: No pleural effusion or pneumothorax.    HEART AND MEDIASTINUM: Cardiomediastinal silhouette size is within normal limits.    SKELETON: No acute abnormality.      IMPRESSION:    Clear lungs.    --- End of Report ---    CAROLINA MONTES MD; Resident Radiology  This document has been electronically signed.  KACI DUMONT MD; Attending Radiologist  This document has been electronically signed. Aug  4 2021  6:50AM    < end of copied text >  ---------------------------------------------------------------------------------------------------------------        
[facility] - PROGRESS NOTE    CHIEF COMPLAINT: RSV infection; bronchospasm; mucous plugging; hypoxemia; dyspnea; dysphagia    INTERVAL HISTORY: eyes open - not following commands; no shortness of breath or hypoxemia on room air; ongoing cough with difficulty expectorating sputum; decreasing chest congestion and wheeze; no fevers, chills or sweats; no chest pain/pressure or palpitations; NPO and has not yet been out of bed      REVIEW OF SYSTEMS:  Constitutional: As per interval history  HEENT: Within normal limits  CV: As per interval history  Resp: As per interval history  GI: dysphagia  : Within normal limits  Musculoskeletal: Within normal limits  Skin: Within normal limits  Neurological: dementia  Psychiatric: Within normal limits  Endocrine: Within normal limits  Hematologic/Lymphatic: Within normal limits  Allergic/Immunologic: Within normal limits    MEDICATIONS:     Pulmonary "  acetylcysteine 20%  Inhalation 4 milliLiter(s) Inhalation three times a day  albuterol/ipratropium for Nebulization 3 milliLiter(s) Nebulizer every 6 hours  sodium chloride 3%  Inhalation 3 milliLiter(s) Inhalation every 6 hours    Anti-microbials:  cefTRIAXone   IVPB 1000 milliGRAM(s) IV Intermittent every 24 hours  cefTRIAXone   IVPB        Cardiovascular:    Other:  carbamide peroxide Otic Solution 5 Drop(s) Both Ears two times a day  heparin   Injectable 5000 Unit(s) SubCutaneous every 12 hours  lactated ringers. 1000 milliLiter(s) IV Continuous <Continuous>    MEDICATIONS  (PRN):  acetaminophen  Suppository .. 650 milliGRAM(s) Rectal every 4 hours PRN Temp greater or equal to 38C (100.4F)        OBJECTIVE:    I&O's Detail    07 Aug 2021 07:01  -  08 Aug 2021 07:00  --------------------------------------------------------  IN:    IV PiggyBack: 50 mL    Lactated Ringers: 1440 mL  Total IN: 1490 mL    OUT:    Incontinent per Condom Catheter (mL): 500 mL    Oral Fluid: 0 mL  Total OUT: 500 mL    Total NET: 990 mL    PHYSICAL EXAM:       ICU Vital Signs Last 24 Hrs  T(C): 36.7 (08 Aug 2021 06:00), Max: 37.2 (07 Aug 2021 16:30)  T(F): 98.1 (08 Aug 2021 06:00), Max: 98.9 (07 Aug 2021 16:30)  HR: 80 (08 Aug 2021 06:00) (74 - 83)  BP: 130/70 (08 Aug 2021 06:00) (122/63 - 130/70)  BP(mean): --  ABP: --  ABP(mean): --  RR: 18 (08 Aug 2021 06:00) (18 - 18)  SpO2: 94% (08 Aug 2021 06:00) (93% - 96%) on room air     General: Eyes open. Not cooperative. No distress. Appears stated age 	  HEENT: Atraumatic. Normocephalic. Anicteric. Normal oral mucosa. PERRL. EOMI.  Neck: Supple. Trachea midline. Thyroid without enlargement/tenderness/nodules. No carotid bruit. No JVD.	  Cardiovascular: Regular rate and rhythm. S1 S2 normal. II/VI systolic murmur  Respiratory: Respirations unlabored. Improved rhonchi and wheeze. No curvature.  Abdomen: Soft. Non-tender. Non-distended. No organomegaly. No masses. Normal bowel sounds.  Extremities: Warm to touch. No clubbing or cyanosis. No pedal edema. Heel pads.  Pulses: 2+ peripheral pulses all extremities.	  Skin: Normal skin color. No rashes or lesions. No ecchymoses. No cyanosis. Warm to touch.  Lymph Nodes: Cervical, supraclavicular and axillary nodes normal  Neurological: Moving all extremities. Intermittent twitching movements. A and O x 0  Psychiatry: Calm    LABS:                          13.9   4.67  )-----------( 144      ( 07 Aug 2021 06:53 )             41.9     CBC    WBC  4.67 <==, 6.46 <==, 8.72 <==, 9.86 <==    Hemoglobin  13.9 <<==, 13.5 <<==, 13.0 <<==, 15.2 <<==    Hematocrit  41.9 <==, 42.1 <==, 39.0 <==, 46.3 <==    Platelets  144 <==, 127 <==, 122 <==, 153 <==      141  |  103  |  7   ----------------------------<  92    08-07  3.6   |  26  |  0.62      LYTES    sodium  141 <==, 133 <==, 136 <==, 136 <==    potassium   3.6 <==, 3.4 <==, 3.6 <==, 3.9 <==    chloride  103 <==, 97 <==, 100 <==, 103 <==    carbon dioxide  26 <==, 22 <==, 22 <==, 25 <==    =============================================================================================  RENAL FUNCTION:    Creatinine:   0.62  <<==, 0.61  <<==, 0.50  <<==, 0.65  <<==    BUN:   7 <==, 8 <==, 6 <==, 10 <==    ============================================================================================    calcium   9.4 <==, 8.8 <==, 8.8 <==, 9.4 <==    mag   1.9 <==    ============================================================================================  LFTs    AST:   16 <== , 13 <== , 17 <==     ALT:  15  <== , 15  <== , 24  <==     AP:  73  <=, 70  <=, 93  <=    Bili:  0.6  <=, 0.6  <=, 0.4  <=    PT/INR - ( 04 Aug 2021 03:20 )   PT: 12.2 sec;   INR: 1.02 ratio      ABG - ( 05 Aug 2021 06:43 )  pH: 7.46  /  pCO2: 38    /  pO2: 58    / HCO3: 27    / Base Excess: 3.5   /  SaO2: 91        Venous Blood Gas:   03:20  7.33/61//  VBG Lactate: 1.6    MICROBIOLOGY:     Respiratory Viral Panel with COVID-19 by [facility] (21 @ 03:16)   Rapid RVP Result: Detected   SARS-CoV-2: NotDetec  Resp Syncytial Virus (RapRVP): Detected     Urinalysis Basic - ( 04 Aug 2021 03:18 )    Color: Light Yellow / Appearance: Clear / S.023 / pH: x  Gluc: x / Ketone: Negative  / Bili: Negative / Urobili: Negative   Blood: x / Protein: Negative / Nitrite: Negative   Leuk Esterase: Negative / RBC: 6 /hpf / WBC 1 /HPF   Sq Epi: x / Non Sq Epi: 0 /hpf / Bacteria: Negative    Culture - Urine (21 @ 04:45)   Specimen Source: Clean Catch Clean Catch (Midstream)   Culture Results:   No growth     Culture - Blood (21 @ 05:57)   Specimen Source: .Blood Blood-Peripheral   Culture Results:   No growth to date.     Culture - Blood (21 @ 05:57)   Specimen Source: .Blood Blood-Peripheral   Culture Results:   No growth to date.     RADIOLOGY:  [x ] Chest radiographs reviewed and interpreted by me    < from: Xray Chest 1 View- PORTABLE-Urgent (21 @ 04:41) >    EXAM:  XR CHEST PORTABLE URGENT 1V                          PROCEDURE DATE:  2021        INTERPRETATION:  CLINICAL INFORMATION: Sepsis.    TECHNIQUE: Frontal radiograph of the chest.    COMPARISON: Chest x-ray 2020    FINDINGS:    LUNGS: Poor inspiratory effort. The lungs are clear. Left hemidiaphragm elevation.    PLEURA: No pleural effusion or pneumothorax.    HEART AND MEDIASTINUM: Cardiomediastinal silhouette size is within normal limits.    SKELETON: No acute abnormality.      IMPRESSION:    Clear lungs.    --- End of Report ---    CAROLINA MONTES MD; Resident Radiology  This document has been electronically signed.  KACI DUMONT MD; Attending Radiologist  This document has been electronically signed. Aug  4 2021  6:50AM    < end of copied text >  ---------------------------------------------------------------------------------------------------------------    
[facility] - PROGRESS NOTE    CHIEF COMPLAINT: RSV infection; bronchospasm; mucous plugging; hypoxemia; dyspnea; dysphagia    INTERVAL HISTORY: awaiting palliative care assistance with goals of care although the family insist they have been caring for their father for more than a decade - he is able to walk several steps with full assistance and eat chopped up food - they are hoping to go the [facility] in the near future; remains lethargic and bedbound - NPO recommended by speech and swallow team; no shortness of breath or hypoxemia on room air; ongoing cough with difficulty expectorating sputum; chest congestion and wheeze; no fevers, chills or sweats; no chest pain/pressure or palpitations;       REVIEW OF SYSTEMS:  Constitutional: As per interval history  HEENT: Within normal limits  CV: As per interval history  Resp: As per interval history  GI: dysphagia  : Within normal limits  Musculoskeletal: Within normal limits  Skin: Within normal limits  Neurological: dementia  Psychiatric: Within normal limits  Endocrine: Within normal limits  Hematologic/Lymphatic: Within normal limits  Allergic/Immunologic: Within normal limits    [x] Unable to assess ROS because of lethargy    MEDICATIONS:     Pulmonary "  acetylcysteine 20%  Inhalation 4 milliLiter(s) Inhalation three times a day  albuterol/ipratropium for Nebulization 3 milliLiter(s) Nebulizer every 6 hours  sodium chloride 3%  Inhalation 3 milliLiter(s) Inhalation every 6 hours    Anti-microbials:    Cardiovascular:    Other:  carbamide peroxide Otic Solution 5 Drop(s) Both Ears two times a day  heparin   Injectable 5000 Unit(s) SubCutaneous every 12 hours  lactated ringers. 1000 milliLiter(s) IV Continuous <Continuous>    MEDICATIONS  (PRN):  acetaminophen  Suppository .. 650 milliGRAM(s) Rectal every 4 hours PRN Temp greater or equal to 38C (100.4F)        OBJECTIVE:    I&O's Detail    09 Aug 2021 07:01  -  10 Aug 2021 07:00  --------------------------------------------------------  IN:    Lactated Ringers: 1440 mL  Total IN: 1440 mL    OUT:    Oral Fluid: 0 mL  Total OUT: 0 mL    Total NET: 1440 mL    PHYSICAL EXAM:       ICU Vital Signs Last 24 Hrs  T(C): 36.6 (10 Aug 2021 09:49), Max: 37.1 (09 Aug 2021 15:40)  T(F): 97.9 (10 Aug 2021 09:49), Max: 98.8 (09 Aug 2021 19:42)  HR: 86 (10 Aug 2021 09:49) (59 - 87)  BP: 146/90 (10 Aug 2021 09:49) (131/67 - 158/60)  BP(mean): --  ABP: --  ABP(mean): --  RR: 22 (10 Aug 2021 09:49) (18 - 24)  SpO2: 95% (10 Aug 2021 09:49) (95% - 96%) on room air     General: Lethargic. Not cooperative. No distress. Audible sputum in the proximal airways. Appears stated age 	  HEENT: Atraumatic. Normocephalic. Anicteric. Normal oral mucosa. PERRL. EOMI.  Neck: Supple. Trachea midline. Thyroid without enlargement/tenderness/nodules. No carotid bruit. No JVD.	  Cardiovascular: Regular rate and rhythm. S1 S2 normal. II/VI systolic murmur  Respiratory: Respirations unlabored. Bilateral course breath sounds and wheeze. No curvature.  Abdomen: Soft. Non-tender. Non-distended. No organomegaly. No masses. Normal bowel sounds.  Extremities: Warm to touch. No clubbing or cyanosis. No pedal edema. Heel pads.  Pulses: 2+ peripheral pulses all extremities.	  Skin: Normal skin color. No rashes or lesions. No ecchymoses. No cyanosis. Warm to touch.  Lymph Nodes: Cervical, supraclavicular and axillary nodes normal  Neurological: Moving all extremities. Intermittent twitching movements. A and O x 0  Psychiatry: Calm    LABS:      CBC    WBC  4.67 <==, 6.46 <==, 8.72 <==, 9.86 <==    Hemoglobin  13.9 <<==, 13.5 <<==, 13.0 <<==, 15.2 <<==    Hematocrit  41.9 <==, 42.1 <==, 39.0 <==, 46.3 <==    Platelets  144 <==, 127 <==, 122 <==, 153 <==      141  |  103  |  7   ----------------------------<      0807  3.6   |  26  |  0.62      LYTES    sodium  141 <==, 133 <==, 136 <==, 136 <==    potassium   3.6 <==, 3.4 <==, 3.6 <==, 3.9 <==    chloride  103 <==, 97 <==, 100 <==, 103 <==    carbon dioxide  26 <==, 22 <==, 22 <==, 25 <==    =============================================================================================  RENAL FUNCTION:    Creatinine:   0.62  <<==, 0.61  <<==, 0.50  <<==, 0.65  <<==    BUN:   7 <==, 8 <==, 6 <==, 10 <==    ============================================================================================    calcium   9.4 <==, 8.8 <==, 8.8 <==, 9.4 <==    mag   1.9 <==    ============================================================================================  LFTs    AST:   16 <== , 13 <== , 17 <==     ALT:  15  <== , 15  <== , 24  <==     AP:  73  <=, 70  <=, 93  <=    Bili:  0.6  <=, 0.6  <=, 0.4  <=    ABG - ( 05 Aug 2021 06:43 )  pH: 7.46  /  pCO2: 38    /  pO2: 58    / HCO3: 27    / Base Excess: 3.5   /  SaO2: 91        Venous Blood Gas:   03:20  7.33/61/21//  VBG Lactate: 1.6    MICROBIOLOGY:     Respiratory Viral Panel with COVID-19 by [facility] (21 @ 03:16)   Rapid RVP Result: Detected   SARS-CoV-2: NotDetec  Resp Syncytial Virus (RapRVP): Detected     Urinalysis Basic - ( 04 Aug 2021 03:18 )    Color: Light Yellow / Appearance: Clear / S.023 / pH: x  Gluc: x / Ketone: Negative  / Bili: Negative / Urobili: Negative   Blood: x / Protein: Negative / Nitrite: Negative   Leuk Esterase: Negative / RBC: 6 /hpf / WBC 1 /HPF   Sq Epi: x / Non Sq Epi: 0 /hpf / Bacteria: Negative    Culture - Urine (21 @ 04:45)   Specimen Source: Clean Catch Clean Catch (Midstream)   Culture Results:   No growth     Culture - Blood (21 @ 05:57)   Specimen Source: .Blood Blood-Peripheral   Culture Results:   No growth to date.     Culture - Blood (21 @ 05:57)   Specimen Source: .Blood Blood-Peripheral   Culture Results:   No growth to date.     RADIOLOGY:  [x ] Chest radiographs reviewed and interpreted by me    < from: Xray Chest 1 View- PORTABLE-Urgent (Xray Chest 1 View- PORTABLE-Urgent .) (21 @ 14:22) >    EXAM:  XR CHEST PORTABLE URGENT 1V                          PROCEDURE DATE:  2021      INTERPRETATION:  EXAMINATION: XR CHEST URGENT    CLINICAL INDICATION: Sepsis, concern for aspiration pneumonia.    TECHNIQUE: Single frontal, portable view of the chest was obtained.    COMPARISON: Chest x-ray dated 2021    FINDINGS:  The heart size is unable to be accurately evaluated on current projection.  Poor inspiratory effort. The lungs are clear.  There is no pneumothorax or pleural effusion.    IMPRESSION:  Clear lungs.    --- End of Report ---    ROBBY MELCHOR MD; Resident Radiologist  This document has been electronically signed.  CARLYLE ABDUL MD; Attending Radiologist  This document has been electronically signed.Aug  9 2021  5:28PM    < end of copied text >  ---------------------------------------------------------------------------------------------------------------  < from: Xray Chest 1 View- PORTABLE-Urgent (21 @ 04:41) >    EXAM:  XR CHEST PORTABLE URGENT 1V                          PROCEDURE DATE:  2021        INTERPRETATION:  CLINICAL INFORMATION: Sepsis.    TECHNIQUE: Frontal radiograph of the chest.    COMPARISON: Chest x-ray 2020    FINDINGS:    LUNGS: Poor inspiratory effort. The lungs are clear. Left hemidiaphragm elevation.    PLEURA: No pleural effusion or pneumothorax.    HEART AND MEDIASTINUM: Cardiomediastinal silhouette size is within normal limits.    SKELETON: No acute abnormality.      IMPRESSION:    Clear lungs.    --- End of Report ---    CAROLINA MONTES MD; Resident Radiology  This document has been electronically signed.  KACI DUMONT MD; Attending Radiologist  This document has been electronically signed. Aug  4 2021  6:50AM    < end of copied text >  ---------------------------------------------------------------------------------------------------------------      
           CARDIOLOGY     PROGRESS  NOTE   ________________________________________________    CHIEF COMPLAINT:Patient is a 71y old  Male who presents with a chief complaint of sob/ htn/ change of mental status (05 Aug 2021 10:39)  no complain.  	  REVIEW OF SYSTEMS:  CONSTITUTIONAL: No fever, weight loss, or fatigue  EYES: No eye pain, visual disturbances, or discharge  ENT:  No difficulty hearing, tinnitus, vertigo; No sinus or throat pain  NECK: No pain or stiffness  RESPIRATORY: No cough, wheezing, chills or hemoptysis; + decreaae  Shortness of Breath  CARDIOVASCULAR: No chest pain, palpitations, passing out, dizziness, or leg swelling  GASTROINTESTINAL: No abdominal or epigastric pain. No nausea, vomiting, or hematemesis; No diarrhea or constipation. No melena or hematochezia.  GENITOURINARY: No dysuria, frequency, hematuria, or incontinence  NEUROLOGICAL: No headaches, memory loss, loss of strength, numbness, or tremors  SKIN: No itching, burning, rashes, or lesions   LYMPH Nodes: No enlarged glands  ENDOCRINE: No heat or cold intolerance; No hair loss  MUSCULOSKELETAL: No joint pain or swelling; No muscle, back, or extremity pain  PSYCHIATRIC: No depression, anxiety, mood swings, or difficulty sleeping  HEME/LYMPH: No easy bruising, or bleeding gums  ALLERGY AND IMMUNOLOGIC: No hives or eczema	    [ ] All others negative	  [x ] Unable to obtain    PHYSICAL EXAM:  T(C): 37.9 (08-06-21 @ 06:40), Max: 38.9 (08-06-21 @ 00:13)  HR: 94 (08-06-21 @ 04:36) (81 - 94)  BP: 145/80 (08-06-21 @ 04:36) (105/64 - 154/84)  RR: 20 (08-06-21 @ 04:36) (18 - 20)  SpO2: 95% (08-06-21 @ 04:36) (92% - 96%)  Wt(kg): --  I&O's Summary    05 Aug 2021 07:01  -  06 Aug 2021 07:00  --------------------------------------------------------  IN: 1440 mL / OUT: 1350 mL / NET: 90 mL        Appearance: Normal	  HEENT:   Normal oral mucosa, PERRL, EOMI	  Lymphatic: No lymphadenopathy  Cardiovascular: Normal S1 S2, No JVD, + murmurs, No edema  Respiratory: rhonchi  Psychiatry: A & O x 3, Mood & affect appropriate  Gastrointestinal:  Soft, Non-tender, + BS	  Skin: No rashes, No ecchymoses, No cyanosis	  Neurologic: Non-focal  Extremities: Normal range of motion, No clubbing, cyanosis or edema  Vascular: Peripheral pulses palpable 2+ bilaterally    MEDICATIONS  (STANDING):  acetylcysteine 20%  Inhalation 4 milliLiter(s) Inhalation three times a day  albuterol/ipratropium for Nebulization 3 milliLiter(s) Nebulizer every 6 hours  cefTRIAXone   IVPB 1000 milliGRAM(s) IV Intermittent every 24 hours  cefTRIAXone   IVPB      heparin   Injectable 5000 Unit(s) SubCutaneous every 12 hours  lactated ringers. 1000 milliLiter(s) (60 mL/Hr) IV Continuous <Continuous>  sodium chloride 3%  Inhalation 3 milliLiter(s) Inhalation every 6 hours      TELEMETRY: 	    ECG:  	  RADIOLOGY:  OTHER: 	  	  LABS:	 	    CARDIAC MARKERS:                                13.5   x     )-----------( 127      ( 06 Aug 2021 06:59 )             42.1     08-05    136  |  100  |  6<L>  ----------------------------<  109<H>  3.6   |  22  |  0.50    Ca    8.8      05 Aug 2021 07:14    TPro  5.3<L>  /  Alb  2.9<L>  /  TBili  0.6  /  DBili  x   /  AST  13  /  ALT  15  /  AlkPhos  70  08-05    proBNP:   Lipid Profile:   HgA1c:   TSH:   oxygen supplementation to keep saturation greater than 92%  head of bed elevation greater than 30 degrees at all times  NPO until mental status improves - consider speech and swallow evaluation   ceftriaxone - blood and urine cultures are negative  albuterol/atrovent nebs q6h  mucomyst and hypertonic saline nebs to facilitate mucous clearance  diligent chest PT including chest vest  oral hygiene and suctioning  acetaminophen for fever  DVT prophylaxis  IV fluids      Assessment and plan  ---------------------------  70yo M Hx of end stage alzhiemers nonverbal at baseline presenting with complaints of SOB. per EMS pt has been coughing for the past several days with worsening SOB today. no fevers at home. not vaccinated for covid. no n/v.  pt with sob ?sec to aspiration pneumoniae /rvp/ rsv  pulmonary eval appreciated  dvt prophylaxis  echo  npo high risk of aspiration  check ABG  add iv bp meds as needed  gentle hydration  speech and swallow eval, ?start dysphagia diet ?ng feeding  doing better today, continue conservative care  spoke to daughter yesterday    	        
           CARDIOLOGY     PROGRESS  NOTE   ________________________________________________    CHIEF Complaint: patient is a 71y old  Male who presents with a chief complaint of sob/ htn/ change of mental status (08 Aug 2021 07:59)  doing better, less secretion.  	  REVIEW OF SYSTEMS:  CONSTITUTIONAL: No fever, weight loss, or fatigue  EYES: No eye pain, visual disturbances, or discharge  ENT:  No difficulty hearing, tinnitus, vertigo; No sinus or throat pain  NECK: No pain or stiffness  RESPIRATORY: No cough, wheezing, chills or hemoptysis; No Shortness of Breath  CARDIOVASCULAR: No chest pain, palpitations, passing out, dizziness, or leg swelling  GASTROINTESTINAL: No abdominal or epigastric pain. No nausea, vomiting, or hematemesis; No diarrhea or constipation. No melena or hematochezia.  GENITOURINARY: No dysuria, frequency, hematuria, or incontinence  NEUROLOGICAL: No headaches, memory loss, loss of strength, numbness, or tremors  SKIN: No itching, burning, rashes, or lesions   LYMPH Nodes: No enlarged glands  ENDOCRINE: No heat or cold intolerance; No hair loss  MUSCULOSKELETAL: No joint pain or swelling; No muscle, back, or extremity pain  PSYCHIATRIC: No depression, anxiety, mood swings, or difficulty sleeping  HEME/LYMPH: No easy bruising, or bleeding gums  ALLERGY AND IMMUNOLOGIC: No hives or eczema	    [ ] All others negative	  [x ] Unable to obtain    PHYSICAL EXAM:  T(C): 36.7 (08-08-21 @ 06:00), Max: 37.2 (08-07-21 @ 16:30)  HR: 80 (08-08-21 @ 06:00) (74 - 83)  BP: 130/70 (08-08-21 @ 06:00) (122/63 - 130/70)  RR: 18 (08-08-21 @ 06:00) (18 - 18)  SpO2: 94% (08-08-21 @ 06:00) (93% - 96%)  Wt(kg): --  I&O's Summary    07 Aug 2021 07:01  -  08 Aug 2021 07:00  --------------------------------------------------------  IN: 1490 mL / OUT: 500 mL / NET: 990 mL        Appearance: Normal	  HEENT:   Normal oral mucosa, PERRL, EOMI	  Lymphatic: No lymphadenopathy  Cardiovascular: Normal S1 S2, No JVD, + murmurs, No edema  Respiratory: Lungs clear to auscultation	  Psychiatry: A & O x 3, Mood & affect appropriate  Gastrointestinal:  Soft, Non-tender, + BS	  Skin: No rashes, No ecchymoses, No cyanosis	  Neurologic: Non-focal  Extremities: Normal range of motion, No clubbing, cyanosis or edema  Vascular: Peripheral pulses palpable 2+ bilaterally    MEDICATIONS  (STANDING):  acetylcysteine 20%  Inhalation 4 milliLiter(s) Inhalation three times a day  albuterol/ipratropium for Nebulization 3 milliLiter(s) Nebulizer every 6 hours  carbamide peroxide Otic Solution 5 Drop(s) Both Ears two times a day  cefTRIAXone   IVPB 1000 milliGRAM(s) IV Intermittent every 24 hours  cefTRIAXone   IVPB      heparin   Injectable 5000 Unit(s) SubCutaneous every 12 hours  lactated ringers. 1000 milliLiter(s) (60 mL/Hr) IV Continuous <Continuous>  sodium chloride 3%  Inhalation 3 milliLiter(s) Inhalation every 6 hours      TELEMETRY: 	    ECG:  	  RADIOLOGY:  OTHER: 	  	  LABS:	 	    CARDIAC MARKERS:                                13.9   4.67  )-----------( 144      ( 07 Aug 2021 06:53 )             41.9     08-07    141  |  103  |  7   ----------------------------<  92  3.6   |  26  |  0.62    Ca    9.4      07 Aug 2021 06:53  Mg     1.9     08-07      proBNP:   Lipid Profile:   HgA1c:   TSH:         Assessment and plan  ---------------------------  70yo M Hx of end stage alzhiemers nonverbal at baseline presenting with complaints of SOB. per EMS pt has been coughing for the past several days with worsening SOB today. no fevers at home. not vaccinated for covid. no n/v.  pt with sob ?sec to aspiration pneumoniae /rvp/ rsv  pulmonary eval appreciated  dvt prophylaxis  echo  npo high risk of aspiration  check ABG  add iv bp meds as needed  gentle hydration  doing better today, continue conservative care  speech and swallow appreciated  will fu mental status  pulmonary appreciated  doing better ?start feeding will ask bedside swallow test      	        
 afebrile    REVIEW OF SYSTEMS:  GEN: no fever,    no chills  RESP: no SOB,   no cough  CVS: no chest pain,   no palpitations  GI: no abdominal pain,   no nausea,   no vomiting,   no constipation,   no diarrhea  : no dysuria,   no frequency  NEURO: no headache,   no dizziness  PSYCH: no depression,   not anxious  Derm : no rash    MEDICATIONS  (STANDING):  acetylcysteine 20%  Inhalation 4 milliLiter(s) Inhalation three times a day  albuterol/ipratropium for Nebulization 3 milliLiter(s) Nebulizer every 6 hours  cefTRIAXone   IVPB 1000 milliGRAM(s) IV Intermittent every 24 hours  cefTRIAXone   IVPB      heparin   Injectable 5000 Unit(s) SubCutaneous every 12 hours  lactated ringers. 1000 milliLiter(s) (60 mL/Hr) IV Continuous <Continuous>  sodium chloride 3%  Inhalation 3 milliLiter(s) Inhalation every 6 hours    MEDICATIONS  (PRN):  acetaminophen  Suppository .. 650 milliGRAM(s) Rectal every 4 hours PRN Temp greater or equal to 38C (100.4F)      Vital Signs Last 24 Hrs  T(C): 37 (05 Aug 2021 09:02), Max: 37.6 (05 Aug 2021 05:09)  T(F): 98.6 (05 Aug 2021 09:02), Max: 99.7 (05 Aug 2021 05:09)  HR: 86 (05 Aug 2021 09:02) (77 - 91)  BP: 128/74 (05 Aug 2021 09:02) (113/68 - 188/75)  BP(mean): --  RR: 18 (05 Aug 2021 09:02) (18 - 20)  SpO2: 96% (05 Aug 2021 09:02) (94% - 100%)  CAPILLARY BLOOD GLUCOSE        I&O's Summary    04 Aug 2021 07:01  -  05 Aug 2021 07:00  --------------------------------------------------------  IN: 120 mL / OUT: 650 mL / NET: -530 mL        PHYSICAL EXAM:  HEAD:  Atraumatic, Normocephalic  NECK: Supple, No   JVD  CHEST/LUNG:   no     rales,     no,    rhonchi  HEART: Regular rate and rhythm;         murmur  ABDOMEN: Soft, Nontender, ;   EXTREMITIES:     no   edema  NEUROLOGY:   dementia    LABS:                        13.0   8.72  )-----------( 122      ( 05 Aug 2021 07:16 )             39.0     08-    136  |  100  |  6<L>  ----------------------------<  109<H>  3.6   |  22  |  0.50    Ca    8.8      05 Aug 2021 07:14    TPro  5.3<L>  /  Alb  2.9<L>  /  TBili  0.6  /  DBili  x   /  AST  13  /  ALT  15  /  AlkPhos  70  08-05    PT/INR - ( 04 Aug 2021 03:20 )   PT: 12.2 sec;   INR: 1.02 ratio         PTT - ( 04 Aug 2021 03:20 )  PTT:26.7 sec      Urinalysis Basic - ( 04 Aug 2021 03:18 )    Color: Light Yellow / Appearance: Clear / S.023 / pH: x  Gluc: x / Ketone: Negative  / Bili: Negative / Urobili: Negative   Blood: x / Protein: Negative / Nitrite: Negative   Leuk Esterase: Negative / RBC: 6 /hpf / WBC 1 /HPF   Sq Epi: x / Non Sq Epi: 0 /hpf / Bacteria: Negative        ABG - ( 05 Aug 2021 06:43 )  pH, Arterial: 7.46  pH, Blood: x     /  pCO2: 38    /  pO2: 58    / HCO3: 27    / Base Excess: 3.5   /  SaO2: 91                - @ 03:20  3.6  21              Consultant(s) Notes Reviewed:      Care Discussed with Consultants/Other Providers:    
[facility] - [facility] - PROGRESS NOTE    CHIEF COMPLAINT: RSV infection; bronchospasm; mucous plugging; hypoxemia; dyspnea; dysphagia    INTERVAL HISTORY: remains lethargic and bedbound - NPO recommended by speech and swallow team - family wants to bring the patient home so they can help him walk and eat; no shortness of breath or hypoxemia on room air; ongoing cough with difficulty expectorating sputum; chest congestion and wheeze; no fevers, chills or sweats; no chest pain/pressure or palpitations;     REVIEW OF SYSTEMS:  Constitutional: As per interval history  HEENT: Within normal limits  CV: As per interval history  Resp: As per interval history  GI: dysphagia  : Within normal limits  Musculoskeletal: Within normal limits  Skin: Within normal limits  Neurological: dementia  Psychiatric: Within normal limits  Endocrine: Within normal limits  Hematologic/Lymphatic: Within normal limits  Allergic/Immunologic: Within normal limits    [l] Unable to assess ROS because of lethargy    MEDICATIONS:     Pulmonary "  albuterol/ipratropium for Nebulization 3 milliLiter(s) Nebulizer every 6 hours  sodium chloride 3%  Inhalation 3 milliLiter(s) Inhalation every 6 hours    Anti-microbials:    Cardiovascular:    Other:  carbamide peroxide Otic Solution 5 Drop(s) Both Ears two times a day  heparin   Injectable 5000 Unit(s) SubCutaneous every 12 hours  lactated ringers. 1000 milliLiter(s) IV Continuous <Continuous>    MEDICATIONS  (PRN):  acetaminophen  Suppository .. 650 milliGRAM(s) Rectal every 4 hours PRN Temp greater or equal to 38C (100.4F)        OBJECTIVE:    I&O's Detail    08 Aug 2021 07:01  -  09 Aug 2021 07:00  --------------------------------------------------------  IN:    Lactated Ringers: 720 mL  Total IN: 720 mL    OUT:    Oral Fluid: 0 mL  Total OUT: 0 mL    Total NET: 720 mL    PHYSICAL EXAM:       ICU Vital Signs Last 24 Hrs  T(C): 36.6 (09 Aug 2021 09:48), Max: 36.7 (08 Aug 2021 16:43)  T(F): 97.8 (09 Aug 2021 09:48), Max: 98.1 (08 Aug 2021 16:43)  HR: 80 (09 Aug 2021 09:48) (66 - 83)  BP: 138/78 (09 Aug 2021 09:48) (126/79 - 142/66)  BP(mean): --  ABP: --  ABP(mean): --  RR: 20 (09 Aug 2021 09:48) (18 - 20)  SpO2: 95% (09 Aug 2021 09:48) (94% - 98%) on room air     General: Lethargic. Not cooperative. No distress. Appears stated age 	  HEENT: Atraumatic. Normocephalic. Anicteric. Normal oral mucosa. PERRL. EOMI.  Neck: Supple. Trachea midline. Thyroid without enlargement/tenderness/nodules. No carotid bruit. No JVD.	  Cardiovascular: Regular rate and rhythm. S1 S2 normal. II/VI systolic murmur  Respiratory: Respirations unlabored. Bilateral course breath sounds and wheeze. No curvature.  Abdomen: Soft. Non-tender. Non-distended. No organomegaly. No masses. Normal bowel sounds.  Extremities: Warm to touch. No clubbing or cyanosis. No pedal edema. Heel pads.  Pulses: 2+ peripheral pulses all extremities.	  Skin: Normal skin color. No rashes or lesions. No ecchymoses. No cyanosis. Warm to touch.  Lymph Nodes: Cervical, supraclavicular and axillary nodes normal  Neurological: Moving all extremities. Intermittent twitching movements. A and O x 0  Psychiatry: Calm    LABS:      CBC    WBC  4.67 <==, 6.46 <==, 8.72 <==, 9.86 <==    Hemoglobin  13.9 <<==, 13.5 <<==, 13.0 <<==, 15.2 <<==    Hematocrit  41.9 <==, 42.1 <==, 39.0 <==, 46.3 <==    Platelets  144 <==, 127 <==, 122 <==, 153 <==      141  |  103  |  7   ----------------------------<  92    08-07  3.6   |  26  |  0.62      LYTES    sodium  141 <==, 133 <==, 136 <==, 136 <==    potassium   3.6 <==, 3.4 <==, 3.6 <==, 3.9 <==    chloride  103 <==, 97 <==, 100 <==, 103 <==    carbon dioxide  26 <==, 22 <==, 22 <==, 25 <==    =============================================================================================  RENAL FUNCTION:    Creatinine:   0.62  <<==, 0.61  <<==, 0.50  <<==, 0.65  <<==    BUN:   7 <==, 8 <==, 6 <==, 10 <==    ============================================================================================    calcium   9.4 <==, 8.8 <==, 8.8 <==, 9.4 <==    mag   1.9 <==    ============================================================================================  LFTs    AST:   16 <== , 13 <== , 17 <==     ALT:  15  <== , 15  <== , 24  <==     AP:  73  <=, 70  <=, 93  <=    Bili:  0.6  <=, 0.6  <=, 0.4  <=    ABG - ( 05 Aug 2021 06:43 )  pH: 7.46  /  pCO2: 38    /  pO2: 58    / HCO3: 27    / Base Excess: 3.5   /  SaO2: 91        Venous Blood Gas:   03:20  7.33/61//  VBG Lactate: 1.6    MICROBIOLOGY:     Respiratory Viral Panel with COVID-19 by [facility] (21 @ 03:16)   Rapid RVP Result: Detected   SARS-CoV-2: NotDetec  Resp Syncytial Virus (RapRVP): Detected     Urinalysis Basic - ( 04 Aug 2021 03:18 )    Color: Light Yellow / Appearance: Clear / S.023 / pH: x  Gluc: x / Ketone: Negative  / Bili: Negative / Urobili: Negative   Blood: x / Protein: Negative / Nitrite: Negative   Leuk Esterase: Negative / RBC: 6 /hpf / WBC 1 /HPF   Sq Epi: x / Non Sq Epi: 0 /hpf / Bacteria: Negative    Culture - Urine (21 @ 04:45)   Specimen Source: Clean Catch Clean Catch (Midstream)   Culture Results:   No growth     Culture - Blood (21 @ 05:57)   Specimen Source: .Blood Blood-Peripheral   Culture Results:   No growth to date.     Culture - Blood (21 @ 05:57)   Specimen Source: .Blood Blood-Peripheral   Culture Results:   No growth to date.     RADIOLOGY:  [x ] Chest radiographs reviewed and interpreted by me    < from: Xray Chest 1 View- PORTABLE-Urgent (21 @ 04:41) >    EXAM:  XR CHEST PORTABLE URGENT 1V                          PROCEDURE DATE:  2021        INTERPRETATION:  CLINICAL INFORMATION: Sepsis.    TECHNIQUE: Frontal radiograph of the chest.    COMPARISON: Chest x-ray 2020    FINDINGS:    LUNGS: Poor inspiratory effort. The lungs are clear. Left hemidiaphragm elevation.    PLEURA: No pleural effusion or pneumothorax.    HEART AND MEDIASTINUM: Cardiomediastinal silhouette size is within normal limits.    SKELETON: No acute abnormality.      IMPRESSION:    Clear lungs.    --- End of Report ---    CAROLINA MONTES MD; Resident Radiology  This document has been electronically signed.  KACI DUMONT MD; Attending Radiologist  This document has been electronically signed. Aug  4 2021  6:50AM    < end of copied text >  ---------------------------------------------------------------------------------------------------------------    
           CARDIOLOGY     PROGRESS  NOTE   ________________________________________________    CHIEF COMPLAINT:Patient is a 71y old  Male who presents with a chief complaint of sob/ htn/ change of mental status (04 Aug 2021 19:20)  still non responsive.  	  REVIEW OF SYSTEMS:  CONSTITUTIONAL: No fever, weight loss, or fatigue  EYES: No eye pain, visual disturbances, or discharge  ENT:  No difficulty hearing, tinnitus, vertigo; No sinus or throat pain  NECK: No pain or stiffness  RESPIRATORY: No cough, wheezing, chills or hemoptysis; No Shortness of Breath  CARDIOVASCULAR: No chest pain, palpitations, passing out, dizziness, or leg swelling  GASTROINTESTINAL: No abdominal or epigastric pain. No nausea, vomiting, or hematemesis; No diarrhea or constipation. No melena or hematochezia.  GENITOURINARY: No dysuria, frequency, hematuria, or incontinence  NEUROLOGICAL: No headaches, memory loss, loss of strength, numbness, or tremors  SKIN: No itching, burning, rashes, or lesions   LYMPH Nodes: No enlarged glands  ENDOCRINE: No heat or cold intolerance; No hair loss  MUSCULOSKELETAL: No joint pain or swelling; No muscle, back, or extremity pain  PSYCHIATRIC: No depression, anxiety, mood swings, or difficulty sleeping  HEME/LYMPH: No easy bruising, or bleeding gums  ALLERGY AND IMMUNOLOGIC: No hives or eczema	    [ ] All others negative	  [ x] Unable to obtain    PHYSICAL EXAM:  T(C): 37.6 (08-05-21 @ 05:09), Max: 37.6 (08-05-21 @ 05:09)  HR: 77 (08-05-21 @ 05:09) (77 - 91)  BP: 113/68 (08-05-21 @ 05:09) (113/68 - 188/75)  RR: 20 (08-05-21 @ 05:09) (18 - 20)  SpO2: 94% (08-05-21 @ 05:09) (94% - 100%)  Wt(kg): --  I&O's Summary    04 Aug 2021 07:01  -  05 Aug 2021 07:00  --------------------------------------------------------  IN: 120 mL / OUT: 650 mL / NET: -530 mL        Appearance: Normal	  HEENT:   Normal oral mucosa, PERRL, EOMI	  Lymphatic: No lymphadenopathy  Cardiovascular: Normal S1 S2, No JVD, + murmurs, No edema  Respiratory: rhonchi  Psychiatry: A & O x 3, Mood & affect appropriate  Gastrointestinal:  Soft, Non-tender, + BS	  Skin: No rashes, No ecchymoses, No cyanosis	  Neurologic: Non-focal  Extremities: Normal range of motion, No clubbing, cyanosis or edema  Vascular: Peripheral pulses palpable 2+ bilaterally    MEDICATIONS  (STANDING):  acetylcysteine 20%  Inhalation 4 milliLiter(s) Inhalation three times a day  albuterol/ipratropium for Nebulization 3 milliLiter(s) Nebulizer every 6 hours  cefTRIAXone   IVPB 1000 milliGRAM(s) IV Intermittent every 24 hours  cefTRIAXone   IVPB      heparin   Injectable 5000 Unit(s) SubCutaneous every 12 hours  lactated ringers. 1000 milliLiter(s) (60 mL/Hr) IV Continuous <Continuous>  sodium chloride 3%  Inhalation 3 milliLiter(s) Inhalation every 6 hours      TELEMETRY: 	    ECG:  	  RADIOLOGY:  OTHER: 	  	  LABS:	 	    CARDIAC MARKERS:                                15.2   9.86  )-----------( 153      ( 04 Aug 2021 03:20 )             46.3     08-04    136  |  103  |  10  ----------------------------<  120<H>  3.9   |  25  |  0.65    Ca    9.4      04 Aug 2021 03:20    TPro  6.5  /  Alb  3.6  /  TBili  0.4  /  DBili  x   /  AST  17  /  ALT  24  /  AlkPhos  93  08-04    proBNP:   Lipid Profile:   HgA1c:   TSH:   PT/INR - ( 04 Aug 2021 03:20 )   PT: 12.2 sec;   INR: 1.02 ratio         PTT - ( 04 Aug 2021 03:20 )  PTT:26.7 sec  < from: Xray Chest 1 View- PORTABLE-Urgent (08.04.21 @ 04:41) >  LUNGS: Poor inspiratory effort. The lungs are clear. Left hemidiaphragm elevation.    PLEURA: No pleural effusion or pneumothorax.    HEART AND MEDIASTINUM: Cardiomediastinal silhouette size is within normal limits.    SKELETON: No acute abnormality.    < from: 12 Lead ECG (04.07.21 @ 21:57) >  Diagnosis Line NORMAL SINUS RHYTHM  LEFT AXIS DEVIATION  ABNORMAL ECG  WHEN COMPARED WITH ECG OF 27-APR-2020  NO SIGNIFICANT CHANGE WAS FOUND    < end of copied text >      Assessment and plan  ---------------------------  70yo M Hx of end stage alzhiemers nonverbal at baseline presenting with complaints of SOB. per EMS pt has been coughing for the past several days with worsening SOB today. no fevers at home. not vaccinated for covid. no n/v.  pt with sob ?sec to aspiration pneumoniae /rvp/ rsv  add abx ? awaiting pulmonary  pulmonary eval appreciated  dvt prophylaxis  echo  npo high risk of aspiration  check ABG  add iv bp meds as needed  gentle hydration  speech and swallow eval, ?start dysphagia diet  doing better today, continue conservative care      	        
           CARDIOLOGY     PROGRESS  NOTE   ________________________________________________    CHIEF COMPLAINT:Patient is a 71y old  Male who presents with a chief complaint of sob/ htn/ change of mental status (06 Aug 2021 08:33)  doing better.  	  REVIEW OF SYSTEMS:  CONSTITUTIONAL: No fever, weight loss, or fatigue  EYES: No eye pain, visual disturbances, or discharge  ENT:  No difficulty hearing, tinnitus, vertigo; No sinus or throat pain  NECK: No pain or stiffness  RESPIRATORY: No cough, wheezing, chills or hemoptysis; No Shortness of Breath  CARDIOVASCULAR: No chest pain, palpitations, passing out, dizziness, or leg swelling  GASTROINTESTINAL: No abdominal or epigastric pain. No nausea, vomiting, or hematemesis; No diarrhea or constipation. No melena or hematochezia.  GENITOURINARY: No dysuria, frequency, hematuria, or incontinence  NEUROLOGICAL: No headaches, memory loss, loss of strength, numbness, or tremors  SKIN: No itching, burning, rashes, or lesions   LYMPH Nodes: No enlarged glands  ENDOCRINE: No heat or cold intolerance; No hair loss  MUSCULOSKELETAL: No joint pain or swelling; No muscle, back, or extremity pain  PSYCHIATRIC: No depression, anxiety, mood swings, or difficulty sleeping  HEME/LYMPH: No easy bruising, or bleeding gums  ALLERGY AND IMMUNOLOGIC: No hives or eczema	    [ ] All others negative	  [ ] Unable to obtain    PHYSICAL EXAM:  T(C): 37 (08-07-21 @ 05:00), Max: 38.6 (08-06-21 @ 18:01)  HR: 76 (08-07-21 @ 05:00) (76 - 101)  BP: 128/75 (08-07-21 @ 05:00) (107/70 - 154/80)  RR: 18 (08-07-21 @ 05:00) (18 - 20)  SpO2: 94% (08-07-21 @ 05:00) (93% - 98%)  Wt(kg): --  I&O's Summary    06 Aug 2021 07:01  -  07 Aug 2021 07:00  --------------------------------------------------------  IN: 1490 mL / OUT: 201 mL / NET: 1289 mL        Appearance: Normal	  HEENT:   Normal oral mucosa, PERRL, EOMI	  Lymphatic: No lymphadenopathy  Cardiovascular: Normal S1 S2, No JVD, + murmurs, No edema  Respiratory: Lungs clear to auscultation	  Gastrointestinal:  Soft, Non-tender, + BS	  Skin: No rashes, No ecchymoses, No cyanosis	  Neurologic: Non-focal  Extremities: Normal range of motion, No clubbing, cyanosis or edema  Vascular: Peripheral pulses palpable 2+ bilaterally    MEDICATIONS  (STANDING):  acetylcysteine 20%  Inhalation 4 milliLiter(s) Inhalation three times a day  albuterol/ipratropium for Nebulization 3 milliLiter(s) Nebulizer every 6 hours  carbamide peroxide Otic Solution 5 Drop(s) Both Ears two times a day  cefTRIAXone   IVPB 1000 milliGRAM(s) IV Intermittent every 24 hours  cefTRIAXone   IVPB      heparin   Injectable 5000 Unit(s) SubCutaneous every 12 hours  lactated ringers. 1000 milliLiter(s) (60 mL/Hr) IV Continuous <Continuous>  sodium chloride 3%  Inhalation 3 milliLiter(s) Inhalation every 6 hours      TELEMETRY: 	    ECG:  	  RADIOLOGY:  OTHER: 	  	  LABS:	 	    CARDIAC MARKERS:                                13.9   4.67  )-----------( 144      ( 07 Aug 2021 06:53 )             41.9     08-07    141  |  103  |  7   ----------------------------<  92  3.6   |  26  |  0.62    Ca    9.4      07 Aug 2021 06:53  Mg     1.9     08-07    TPro  5.6<L>  /  Alb  2.9<L>  /  TBili  0.6  /  DBili  x   /  AST  16  /  ALT  15  /  AlkPhos  73  08-06    proBNP:   Lipid Profile:   HgA1c:   TSH:   · Consistencies administered	small piece of crushed ice chip  · Mode of Presentation	spoon; fed by clinician  · Positioning	upright (90 degrees)  · Oral Preparatory Phase	Within functional limits  · Oral Phase	Within functional limits  · Pharyngeal Phase	immediate aggressive wet coughing  oxygen supplementation to keep saturation greater than 92% - decrease nasal canula to 2lpm  head of bed elevation greater than 30 degrees at all times  NPO until mental status improves - await speech and swallow evaluation   ceftriaxone x 5 days - blood and urine cultures are negative  albuterol/atrovent nebs q6h  mucomyst and hypertonic saline nebs to facilitate mucous clearance  diligent chest PT   discontinue chest vest  oral hygiene and suctioning  acetaminophen for fever  DVT prophylaxis  IV fluids      Assessment and plan  ---------------------------  70yo M Hx of end stage alzhiemers nonverbal at baseline presenting with complaints of SOB. per EMS pt has been coughing for the past several days with worsening SOB today. no fevers at home. not vaccinated for covid. no n/v.  pt with sob ?sec to aspiration pneumoniae /rvp/ rsv  pulmonary eval appreciated  dvt prophylaxis  echo  npo high risk of aspiration  check ABG  add iv bp meds as needed  gentle hydration  speech and swallow eval, ?start dysphagia diet ?ng feeding  doing better today, continue conservative care  speech and swallow appreciated  will fu mental status  pulmonary appreciated      	        
           CARDIOLOGY     PROGRESS  NOTE   ________________________________________________    CHIEF COMPLAINT:Patient is a 71y old  Male who presents with a chief complaint of sob/ htn/ change of mental status (10 Aug 2021 08:03)  no complain.  	  REVIEW OF SYSTEMS:  CONSTITUTIONAL: No fever, weight loss, or fatigue  EYES: No eye pain, visual disturbances, or discharge  ENT:  No difficulty hearing, tinnitus, vertigo; No sinus or throat pain  NECK: No pain or stiffness  RESPIRATORY: No cough, wheezing, chills or hemoptysis; No Shortness of Breath  CARDIOVASCULAR: No chest pain, palpitations, passing out, dizziness, or leg swelling  GASTROINTESTINAL: No abdominal or epigastric pain. No nausea, vomiting, or hematemesis; No diarrhea or constipation. No melena or hematochezia.  GENITOURINARY: No dysuria, frequency, hematuria, or incontinence  NEUROLOGICAL: No headaches, memory loss, loss of strength, numbness, or tremors  SKIN: No itching, burning, rashes, or lesions   LYMPH Nodes: No enlarged glands  ENDOCRINE: No heat or cold intolerance; No hair loss  MUSCULOSKELETAL: No joint pain or swelling; No muscle, back, or extremity pain  PSYCHIATRIC: No depression, anxiety, mood swings, or difficulty sleeping  HEME/LYMPH: No easy bruising, or bleeding gums  ALLERGY AND IMMUNOLOGIC: No hives or eczema	    [ ] All others negative	  [ ] Unable to obtain    PHYSICAL EXAM:  T(C): 36.4 (08-10-21 @ 04:45), Max: 37.1 (08-09-21 @ 15:40)  HR: 61 (08-10-21 @ 04:45) (59 - 87)  BP: 158/60 (08-10-21 @ 04:45) (131/67 - 158/60)  RR: 24 (08-10-21 @ 04:45) (18 - 24)  SpO2: 96% (08-10-21 @ 04:45) (95% - 96%)  Wt(kg): --  I&O's Summary    09 Aug 2021 07:01  -  10 Aug 2021 07:00  --------------------------------------------------------  IN: 1440 mL / OUT: 0 mL / NET: 1440 mL        Appearance: Normal	  HEENT:   Normal oral mucosa, PERRL, EOMI	  Lymphatic: No lymphadenopathy  Cardiovascular: Normal S1 S2, No JVD, + murmurs, No edema  Respiratory: Lungs clear to auscultation	  Psychiatry: more awake  Gastrointestinal:  Soft, Non-tender, + BS	  Skin: No rashes, No ecchymoses, No cyanosis	  Neurologic: Non-focal  Extremities: Normal range of motion, No clubbing, cyanosis or edema  Vascular: Peripheral pulses palpable 2+ bilaterally    MEDICATIONS  (STANDING):  acetylcysteine 20%  Inhalation 4 milliLiter(s) Inhalation three times a day  albuterol/ipratropium for Nebulization 3 milliLiter(s) Nebulizer every 6 hours  carbamide peroxide Otic Solution 5 Drop(s) Both Ears two times a day  heparin   Injectable 5000 Unit(s) SubCutaneous every 12 hours  lactated ringers. 1000 milliLiter(s) (60 mL/Hr) IV Continuous <Continuous>  sodium chloride 3%  Inhalation 3 milliLiter(s) Inhalation every 6 hours      TELEMETRY: 	    ECG:  	  RADIOLOGY:  OTHER: 	  	  LABS:	 	    CARDIAC MARKERS:                  proBNP:   Lipid Profile:   HgA1c:   TSH:   > Stringent oral care with suction to reduce oral pathogens   > Aspiration precautions   > Diligent chest PT   > May benefit from transnasal suctioning given inability to expectorate secretions, as verbally reported  > Head of bed elevation greater than 30 degrees at all times to assist with secretion management   > Service will follow up pending [facility] as daughter wishing to be discharged at this time   Notes: Chart reviewed and noted, covering CM notes appreciated.  Per pulnisha De Guzman pt will need home nebulizer and portable suction device.  Daughter Sade  is also requesting WC.  RXs have been sent to [facility] and  Sade will be contacted for delivery.  Sade reiterates that she intends to  take pt back home to personally resume care for him upon discharge.  CM will  continue to follow and remain available to assit with discharge planning needs.    Assessment and plan  ---------------------------  70yo M Hx of end stage alzhiemers nonverbal at baseline presenting with complaints of SOB. per EMS pt has been coughing for the past several days with worsening SOB today. no fevers at home. not vaccinated for covid. no n/v.  pt with sob ?sec to aspiration pneumoniae /rvp/ rsv  pulmonary eval appreciated  dvt prophylaxis  echo  npo high risk of aspiration  check ABG  add iv bp meds as needed  gentle hydration  doing better today, continue conservative care  speech and swallow appreciated  will fu mental status  pulmonary appreciated  speech and swallow recommends npo, daughter wants to take him home and feed him, risk of aspiration and death explained.  speech and swallow fu today  pt family are refusing PEG       	        
 afberile    REVIEW OF SYSTEMS:  GEN: no fever,    no chills  RESP: no SOB,   no cough  CVS: no chest pain,   no palpitations  GI: no abdominal pain,   no nausea,   no vomiting,   no constipation,   no diarrhea  : no dysuria,   no frequency  NEURO: no headache,   no dizziness  PSYCH: no depression,   not anxious  Derm : no rash    MEDICATIONS  (STANDING):  acetylcysteine 20%  Inhalation 4 milliLiter(s) Inhalation three times a day  albuterol/ipratropium for Nebulization 3 milliLiter(s) Nebulizer every 6 hours  carbamide peroxide Otic Solution 5 Drop(s) Both Ears two times a day  heparin   Injectable 5000 Unit(s) SubCutaneous every 12 hours  lactated ringers. 1000 milliLiter(s) (60 mL/Hr) IV Continuous <Continuous>  sodium chloride 3%  Inhalation 3 milliLiter(s) Inhalation every 6 hours    MEDICATIONS  (PRN):  acetaminophen  Suppository .. 650 milliGRAM(s) Rectal every 4 hours PRN Temp greater or equal to 38C (100.4F)      Vital Signs Last 24 Hrs  T(C): 36.4 (10 Aug 2021 04:45), Max: 37.1 (09 Aug 2021 15:40)  T(F): 97.6 (10 Aug 2021 04:45), Max: 98.8 (09 Aug 2021 19:42)  HR: 61 (10 Aug 2021 04:45) (59 - 87)  BP: 158/60 (10 Aug 2021 04:45) (131/67 - 158/60)  BP(mean): --  RR: 24 (10 Aug 2021 04:45) (18 - 24)  SpO2: 96% (10 Aug 2021 04:45) (95% - 96%)  CAPILLARY BLOOD GLUCOSE        I&O's Summary    09 Aug 2021 07:01  -  10 Aug 2021 07:00  --------------------------------------------------------  IN: 1440 mL / OUT: 0 mL / NET: 1440 mL        PHYSICAL EXAM:  HEAD:  Atraumatic, Normocephalic  NECK: Supple, No   JVD  CHEST/LUNG:   no     rales,     no,    rhonchi  HEART: Regular rate and rhythm;         murmur  ABDOMEN: Soft, Nontender, ;   EXTREMITIES:    no    edema  NEUROLOGY:  non verbal    LABS:                                  Consultant(s) Notes Reviewed:      Care Discussed with Consultants/Other Providers:    
[facility] - PROGRESS NOTE    CHIEF COMPLAINT: RSV infection; bronchospasm; mucous plugging; hypoxemia; dyspnea    INTERVAL HISTORY: minimally more responsive - daughter reports that he knows he is in the hospital and recognizes her; no shortness of breath or hypoxemia on a nasal canula @ 3lpm; ongoing cough with difficulty expectorating sputum; improving chest congestion and wheeze; no fevers, chills or sweats; no chest pain/pressure or palpitations; NPO awaiting speech and swallow evaluation    REVIEW OF SYSTEMS:  Constitutional: As per interval history  HEENT: Within normal limits  CV: As per interval history  Resp: As per interval history  GI: Within normal limits   : Within normal limits  Musculoskeletal: Within normal limits  Skin: Within normal limits  Neurological: Within normal limits  Psychiatric: Within normal limits  Endocrine: Within normal limits  Hematologic/Lymphatic: Within normal limits  Allergic/Immunologic: Within normal limits    [x] Unable to assess ROS because of dementia    MEDICATIONS:     Pulmonary "  acetylcysteine 20%  Inhalation 4 milliLiter(s) Inhalation three times a day  albuterol/ipratropium for Nebulization 3 milliLiter(s) Nebulizer every 6 hours  sodium chloride 3%  Inhalation 3 milliLiter(s) Inhalation every 6 hours    Anti-microbials:  cefTRIAXone   IVPB 1000 milliGRAM(s) IV Intermittent every 24 hours  cefTRIAXone   IVPB        Cardiovascular:    Other:  heparin   Injectable 5000 Unit(s) SubCutaneous every 12 hours  lactated ringers. 1000 milliLiter(s) IV Continuous <Continuous>    MEDICATIONS  (PRN):  acetaminophen  Suppository .. 650 milliGRAM(s) Rectal every 4 hours PRN Temp greater or equal to 38C (100.4F)        OBJECTIVE:    I&O's Detail    05 Aug 2021 07:01  -  06 Aug 2021 07:00  --------------------------------------------------------  IN:    Lactated Ringers: 1440 mL  Total IN: 1440 mL    OUT:    Incontinent per Condom Catheter (mL): 1350 mL    Oral Fluid: 0 mL  Total OUT: 1350 mL    Total NET: 90 mL      Daily Weight in k (06 Aug 2021 00:13)    PHYSICAL EXAM:       ICU Vital Signs Last 24 Hrs  T(C): 37.8 (06 Aug 2021 07:55), Max: 38.9 (06 Aug 2021 00:13)  T(F): 100 (06 Aug 2021 07:55), Max: 102.1 (06 Aug 2021 00:13)  HR: 90 (06 Aug 2021 07:55) (81 - 94)  BP: 140/74 (06 Aug 2021 07:55) (105/64 - 154/84)  BP(mean): --  ABP: --  ABP(mean): --  RR: 20 (06 Aug 2021 07:55) (18 - 20)  SpO2: 95% (06 Aug 2021 07:55) (92% - 95%) on 3lpm nasal canula     General: Lethargic. Not cooperative. No distress. Appears stated age 	  HEENT: Atraumatic. Normocephalic. Anicteric. Normal oral mucosa. PERRL. EOMI.  Neck: Supple. Trachea midline. Thyroid without enlargement/tenderness/nodules. No carotid bruit. No JVD.	  Cardiovascular: Regular rate and rhythm. S1 S2 normal. II/VI systolic murmur  Respiratory: Respirations unlabored. Diffuse rhonchi and wheeze. No curvature.  Abdomen: Soft. Non-tender. Non-distended. No organomegaly. No masses. Normal bowel sounds.  Extremities: Warm to touch. No clubbing or cyanosis. No pedal edema. Heel pads.  Pulses: 2+ peripheral pulses all extremities.	  Skin: Normal skin color. No rashes or lesions. No ecchymoses. No cyanosis. Warm to touch.  Lymph Nodes: Cervical, supraclavicular and axillary nodes normal  Neurological: Lethargic. Moving all extremities. Intermittent twitching movements. A and O x 0  Psychiatry: Calm    LABS:                          13.5   6.46  )-----------( 127      ( 06 Aug 2021 06:59 )             42.1     CBC    WBC  6.46 <==, 8.72 <==, 9.86 <==    Hemoglobin  13.5 <<==, 13.0 <<==, 15.2 <<==    Hematocrit  42.1 <==, 39.0 <==, 46.3 <==    Platelets  127 <==, 122 <==, 153 <==      133<L>  |  97  |  8   ----------------------------<  88    08-06  3.4<L>   |  22  |  0.61      LYTES    sodium  133 <==, 136 <==, 136 <==    potassium   3.4 <==, 3.6 <==, 3.9 <==    chloride  97 <==, 100 <==, 103 <==    carbon dioxide  22 <==, 22 <==, 25 <==    =============================================================================================  RENAL FUNCTION:    Creatinine:   0.61  <<==, 0.50  <<==, 0.65  <<==    BUN:   8 <==, 6 <==, 10 <==    ============================================================================================    calcium   8.8 <==, 8.8 <==, 9.4 <==    ============================================================================================  LFTs    AST:   16 <== , 13 <== , 17 <==     ALT:  15  <== , 15  <== , 24  <==     AP:  73  <=, 70  <=, 93  <=    Bili:  0.6  <=, 0.6  <=, 0.4  <=    PT/INR - ( 04 Aug 2021 03:20 )   PT: 12.2 sec;   INR: 1.02 ratio      ABG - ( 05 Aug 2021 06:43 )  pH: 7.46  /  pCO2: 38    /  pO2: 58    / HCO3: 27    / Base Excess: 3.5   /  SaO2: 91        Venous Blood Gas:   03:20  7.33/61//  VBG Lactate: 1.6    MICROBIOLOGY:     Respiratory Viral Panel with COVID-19 by GLADYS (21 @ 03:16)   Rapid RVP Result: Detected   SARS-CoV-2: NotDetec  Resp Syncytial Virus (RapRVP): Detected     Urinalysis Basic - ( 04 Aug 2021 03:18 )    Color: Light Yellow / Appearance: Clear / S.023 / pH: x  Gluc: x / Ketone: Negative  / Bili: Negative / Urobili: Negative   Blood: x / Protein: Negative / Nitrite: Negative   Leuk Esterase: Negative / RBC: 6 /hpf / WBC 1 /HPF   Sq Epi: x / Non Sq Epi: 0 /hpf / Bacteria: Negative    Culture - Urine (21 @ 04:45)   Specimen Source: Clean Catch Clean Catch (Midstream)   Culture Results:   No growth     Culture - Blood (21 @ 05:57)   Specimen Source: .Blood Blood-Peripheral   Culture Results:   No growth to date.     Culture - Blood (21 @ 05:57)   Specimen Source: .Blood Blood-Peripheral   Culture Results:   No growth to date.     RADIOLOGY:  [x ] Chest radiographs reviewed and interpreted by me    < from: Xray Chest 1 View- PORTABLE-Urgent (21 @ 04:41) >    EXAM:  XR CHEST PORTABLE URGENT 1V                          PROCEDURE DATE:  2021        INTERPRETATION:  CLINICAL INFORMATION: Sepsis.    TECHNIQUE: Frontal radiograph of the chest.    COMPARISON: Chest x-ray 2020    FINDINGS:    LUNGS: Poor inspiratory effort. The lungs are clear. Left hemidiaphragm elevation.    PLEURA: No pleural effusion or pneumothorax.    HEART AND MEDIASTINUM: Cardiomediastinal silhouette size is within normal limits.    SKELETON: No acute abnormality.      IMPRESSION:    Clear lungs.    --- End of Report ---    CAROLINA MONTES MD; Resident Radiology  This document has been electronically signed.  KACI DUMONT MD; Attending Radiologist  This document has been electronically signed. Aug  4 2021  6:50AM    < end of copied text >  ---------------------------------------------------------------------------------------------------------------      
           CARDIOLOGY     PROGRESS  NOTE   ________________________________________________    CHIEF COMPLAINT:Patient is a 71y old  Male who presents with a chief complaint of sob/ htn/ change of mental status (08 Aug 2021 09:23)  more awake.  	  REVIEW OF SYSTEMS:  CONSTITUTIONAL: No fever, weight loss, or fatigue  EYES: No eye pain, visual disturbances, or discharge  ENT:  No difficulty hearing, tinnitus, vertigo; No sinus or throat pain  NECK: No pain or stiffness  RESPIRATORY: No cough, wheezing, chills or hemoptysis; No Shortness of Breath  CARDIOVASCULAR: No chest pain, palpitations, passing out, dizziness, or leg swelling  GASTROINTESTINAL: No abdominal or epigastric pain. No nausea, vomiting, or hematemesis; No diarrhea or constipation. No melena or hematochezia.  GENITOURINARY: No dysuria, frequency, hematuria, or incontinence  NEUROLOGICAL: No headaches, memory loss, loss of strength, numbness, or tremors  SKIN: No itching, burning, rashes, or lesions   LYMPH Nodes: No enlarged glands  ENDOCRINE: No heat or cold intolerance; No hair loss  MUSCULOSKELETAL: No joint pain or swelling; No muscle, back, or extremity pain  PSYCHIATRIC: No depression, anxiety, mood swings, or difficulty sleeping  HEME/LYMPH: No easy bruising, or bleeding gums  ALLERGY AND IMMUNOLOGIC: No hives or eczema	    [ ] All others negative	  [x ] Unable to obtain    PHYSICAL EXAM:  T(C): 36.7 (08-09-21 @ 04:45), Max: 36.7 (08-08-21 @ 16:43)  HR: 75 (08-09-21 @ 04:45) (66 - 83)  BP: 126/79 (08-09-21 @ 04:45) (126/79 - 142/66)  RR: 20 (08-09-21 @ 04:45) (18 - 20)  SpO2: 97% (08-09-21 @ 04:45) (94% - 98%)  Wt(kg): --  I&O's Summary    08 Aug 2021 07:01  -  09 Aug 2021 07:00  --------------------------------------------------------  IN: 720 mL / OUT: 0 mL / NET: 720 mL        Appearance: Normal	  HEENT:   Normal oral mucosa, PERRL, EOMI	  Lymphatic: No lymphadenopathy  Cardiovascular: Normal S1 S2, No JVD, + murmurs, No edema  Respiratory: Lungs clear to auscultation	  Psychiatry: A & O x 3, Mood & affect appropriate  Gastrointestinal:  Soft, Non-tender, + BS	  Skin: No rashes, No ecchymoses, No cyanosis	  Neurologic: Non-focal  Extremities: Normal range of motion, No clubbing, cyanosis or edema  Vascular: Peripheral pulses palpable 2+ bilaterally    MEDICATIONS  (STANDING):  albuterol/ipratropium for Nebulization 3 milliLiter(s) Nebulizer every 6 hours  carbamide peroxide Otic Solution 5 Drop(s) Both Ears two times a day  heparin   Injectable 5000 Unit(s) SubCutaneous every 12 hours  lactated ringers. 1000 milliLiter(s) (60 mL/Hr) IV Continuous <Continuous>  sodium chloride 3%  Inhalation 3 milliLiter(s) Inhalation every 6 hours      TELEMETRY: 	    ECG:  	  RADIOLOGY:  OTHER: 	  	  LABS:	 	    CARDIAC MARKERS:        > NPO, with non-oral nutrition/hydration/medications as per GOC  > If family/pt declines NPO recommendations would suggest GOC discussion. If wish for po being aware of the HIGH likelihood of aspiration w/ possible complications such as intubation, would suggest dysphagia 1 and honey thick liquids as conservative textures.   > Stringent oral care with suction to reduce oral pathogens   > Aspiration precautions   > Diligent chest PT   > May benefit from transnasal suctioning given inability to expectorate secretions, as verbally reported  > Head of bed elevation greater than 30 degrees at all times to assist with secretion management   > Service will follow up pending GOC as daughter wishing to be discharged at this time           proBNP:   Lipid Profile:   HgA1c:   TSH:         Assessment and plan  ---------------------------  70yo M Hx of end stage alzhiemers nonverbal at baseline presenting with complaints of SOB. per EMS pt has been coughing for the past several days with worsening SOB today. no fevers at home. not vaccinated for covid. no n/v.  pt with sob ?sec to aspiration pneumoniae /rvp/ rsv  pulmonary eval appreciated  dvt prophylaxis  echo  npo high risk of aspiration  check ABG  add iv bp meds as needed  gentle hydration  doing better today, continue conservative care  speech and swallow appreciated  will fu mental status  pulmonary appreciated  speech and swallow recommends npo, daughter wants to take him home and feed him, risk of aspiration and death explained.    	        
 afberile    REVIEW OF SYSTEMS:  GEN: no fever,    no chills  RESP: no SOB,   no cough  CVS: no chest pain,   no palpitations  GI: no abdominal pain,   no nausea,   no vomiting,   no constipation,   no diarrhea  : no dysuria,   no frequency  NEURO: no headache,   no dizziness  PSYCH: no depression,   not anxious  Derm : no rash    MEDICATIONS  (STANDING):  albuterol/ipratropium for Nebulization 3 milliLiter(s) Nebulizer every 6 hours  carbamide peroxide Otic Solution 5 Drop(s) Both Ears two times a day  heparin   Injectable 5000 Unit(s) SubCutaneous every 12 hours  lactated ringers. 1000 milliLiter(s) (60 mL/Hr) IV Continuous <Continuous>  sodium chloride 3%  Inhalation 3 milliLiter(s) Inhalation every 6 hours    MEDICATIONS  (PRN):  acetaminophen  Suppository .. 650 milliGRAM(s) Rectal every 4 hours PRN Temp greater or equal to 38C (100.4F)      Vital Signs Last 24 Hrs  T(C): 36.7 (09 Aug 2021 04:45), Max: 36.7 (08 Aug 2021 16:43)  T(F): 98 (09 Aug 2021 04:45), Max: 98.1 (08 Aug 2021 16:43)  HR: 75 (09 Aug 2021 04:45) (66 - 83)  BP: 126/79 (09 Aug 2021 04:45) (126/79 - 142/66)  BP(mean): --  RR: 20 (09 Aug 2021 04:45) (18 - 20)  SpO2: 97% (09 Aug 2021 04:45) (94% - 98%)  CAPILLARY BLOOD GLUCOSE        I&O's Summary    08 Aug 2021 07:01  -  09 Aug 2021 07:00  --------------------------------------------------------  IN: 720 mL / OUT: 0 mL / NET: 720 mL        PHYSICAL EXAM:  HEAD:  Atraumatic, Normocephalic  NECK: Supple, No   JVD  CHEST/LUNG:   no     rales,     no,    rhonchi  HEART: Regular rate and rhythm;         murmur  ABDOMEN: Soft, Nontender, ;   EXTREMITIES:  no      edema  NEUROLOGY:  alert    LABS:                                  Consultant(s) Notes Reviewed:      Care Discussed with Consultants/Other Providers:    
[facility] - [facility] - PROGRESS NOTE    CHIEF COMPLAINT: RSV infection; bronchospasm; mucous plugging; hypoxemia; dyspnea    INTERVAL HISTORY: lethargic; arouses to deep painful stimulation; no shortness of breath or hypoxemia on a nasal canula @ 3lpm; ongoing cough with difficulty expectorating sputum; chest congestion and wheeze; no fevers, chills or sweats; no chest pain/pressure or palpitations; NPO.        MEDICATIONS:     Pulmonary "  acetylcysteine 20%  Inhalation 4 milliLiter(s) Inhalation three times a day  albuterol/ipratropium for Nebulization 3 milliLiter(s) Nebulizer every 6 hours  sodium chloride 3%  Inhalation 3 milliLiter(s) Inhalation every 6 hours    Anti-microbials:  cefTRIAXone   IVPB 1000 milliGRAM(s) IV Intermittent every 24 hours  cefTRIAXone   IVPB        Cardiovascular:    Other:  carbamide peroxide Otic Solution 5 Drop(s) Both Ears two times a day  heparin   Injectable 5000 Unit(s) SubCutaneous every 12 hours  lactated ringers. 1000 milliLiter(s) IV Continuous <Continuous>    MEDICATIONS  (PRN):  acetaminophen  Suppository .. 650 milliGRAM(s) Rectal every 4 hours PRN Temp greater or equal to 38C (100.4F)    OBJECTIVE:    I&O's Detail    06 Aug 2021 07:01  -  07 Aug 2021 07:00  --------------------------------------------------------  IN:    IV PiggyBack: 50 mL    Lactated Ringers: 1440 mL  Total IN: 1490 mL    OUT:    Incontinent per Condom Catheter (mL): 1 mL    Oral Fluid: 0 mL    Voided (mL): 200 mL  Total OUT: 201 mL    Total NET: 1289 mL    PHYSICAL EXAM:       ICU Vital Signs Last 24 Hrs  T(C): 36.4 (07 Aug 2021 07:46), Max: 38.6 (06 Aug 2021 18:01)  T(F): 97.6 (07 Aug 2021 07:46), Max: 101.4 (06 Aug 2021 18:01)  HR: 83 (07 Aug 2021 07:46) (76 - 101)  BP: 124/77 (07 Aug 2021 07:46) (107/70 - 154/80)  BP(mean): --  ABP: --  ABP(mean): --  RR: 18 (07 Aug 2021 07:46) (18 - 20)  SpO2: 94% (07 Aug 2021 07:46) (93% - 98%) on 3lpm nasal canula     General: Lethargic. Not cooperative. No distress. Appears stated age 	  HEENT: Atraumatic. Normocephalic. Anicteric. Normal oral mucosa. PERRL. EOMI.  Neck: Supple. Trachea midline. Thyroid without enlargement/tenderness/nodules. No carotid bruit. No JVD.	  Cardiovascular: Regular rate and rhythm. S1 S2 normal. II/VI systolic murmur  Respiratory: Respirations unlabored. Diffuse rhonchi and wheeze. No curvature.  Abdomen: Soft. Non-tender. Non-distended. No organomegaly. No masses. Normal bowel sounds.  Extremities: Warm to touch. No clubbing or cyanosis. No pedal edema. Heel pads.  Pulses: 2+ peripheral pulses all extremities.	  Skin: Normal skin color. No rashes or lesions. No ecchymoses. No cyanosis. Warm to touch.  Lymph Nodes: Cervical, supraclavicular and axillary nodes normal  Neurological: Lethargic. Moving all extremities. Intermittent twitching movements. A and O x 0  Psychiatry: Calm    LABS:                          13.9   4.67  )-----------( 144      ( 07 Aug 2021 06:53 )             41.9     CBC    WBC  4.67 <==, 6.46 <==, 8.72 <==, 9.86 <==    Hemoglobin  13.9 <<==, 13.5 <<==, 13.0 <<==, 15.2 <<==    Hematocrit  41.9 <==, 42.1 <==, 39.0 <==, 46.3 <==    Platelets  144 <==, 127 <==, 122 <==, 153 <==      141  |  103  |  7   ----------------------------<  92    08-07  3.6   |  26  |  0.62      LYTES    sodium  141 <==, 133 <==, 136 <==, 136 <==    potassium   3.6 <==, 3.4 <==, 3.6 <==, 3.9 <==    chloride  103 <==, 97 <==, 100 <==, 103 <==    carbon dioxide  26 <==, 22 <==, 22 <==, 25 <==    =============================================================================================  RENAL FUNCTION:    Creatinine:   0.62  <<==, 0.61  <<==, 0.50  <<==, 0.65  <<==    BUN:   7 <==, 8 <==, 6 <==, 10 <==    ============================================================================================    calcium   9.4 <==, 8.8 <==, 8.8 <==, 9.4 <==    mag   1.9 <==    ============================================================================================  LFTs    AST:   16 <== , 13 <== , 17 <==     ALT:  15  <== , 15  <== , 24  <==     AP:  73  <=, 70  <=, 93  <=    Bili:  0.6  <=, 0.6  <=, 0.4  <=    PT/INR - ( 04 Aug 2021 03:20 )   PT: 12.2 sec;   INR: 1.02 ratio      ABG - ( 05 Aug 2021 06:43 )  pH: 7.46  /  pCO2: 38    /  pO2: 58    / HCO3: 27    / Base Excess: 3.5   /  SaO2: 91        Venous Blood Gas:   03:20  7.33/61///  VBG Lactate: 1.6    MICROBIOLOGY:     Respiratory Viral Panel with COVID-19 by [facility] (21 @ 03:16)   Rapid RVP Result: Detected   SARS-CoV-2: NotDetec  Resp Syncytial Virus (RapRVP): Detected     Urinalysis Basic - ( 04 Aug 2021 03:18 )    Color: Light Yellow / Appearance: Clear / S.023 / pH: x  Gluc: x / Ketone: Negative  / Bili: Negative / Urobili: Negative   Blood: x / Protein: Negative / Nitrite: Negative   Leuk Esterase: Negative / RBC: 6 /hpf / WBC 1 /HPF   Sq Epi: x / Non Sq Epi: 0 /hpf / Bacteria: Negative    Culture - Urine (21 @ 04:45)   Specimen Source: Clean Catch Clean Catch (Midstream)   Culture Results:   No growth     Culture - Blood (21 @ 05:57)   Specimen Source: .Blood Blood-Peripheral   Culture Results:   No growth to date.     Culture - Blood (21 @ 05:57)   Specimen Source: .Blood Blood-Peripheral   Culture Results:   No growth to date.     RADIOLOGY:  [x ] Chest radiographs reviewed and interpreted by me    < from: Xray Chest 1 View- PORTABLE-Urgent (21 @ 04:41) >    EXAM:  XR CHEST PORTABLE URGENT 1V                          PROCEDURE DATE:  2021        INTERPRETATION:  CLINICAL INFORMATION: Sepsis.    TECHNIQUE: Frontal radiograph of the chest.    COMPARISON: Chest x-ray 2020    FINDINGS:    LUNGS: Poor inspiratory effort. The lungs are clear. Left hemidiaphragm elevation.    PLEURA: No pleural effusion or pneumothorax.    HEART AND MEDIASTINUM: Cardiomediastinal silhouette size is within normal limits.    SKELETON: No acute abnormality.      IMPRESSION:    Clear lungs.    --- End of Report ---    CAROLINA MONTES MD; Resident Radiology  This document has been electronically signed.  KACI DUMONT MD; Attending Radiologist  This document has been electronically signed. Aug  4 2021  6:50AM    < end of copied text >  ---------------------------------------------------------------------------------------------------------------      
   afberile  REVIEW OF SYSTEMS:  GEN: no fever,    no chills  RESP: no SOB,   no cough  CVS: no chest pain,   no palpitations  GI: no abdominal pain,   no nausea,   no vomiting,   no constipation,   no diarrhea  : no dysuria,   no frequency  NEURO: no headache,   no dizziness  PSYCH: no depression,   not anxious  Derm : no rash    MEDICATIONS  (STANDING):  acetylcysteine 20%  Inhalation 4 milliLiter(s) Inhalation three times a day  albuterol/ipratropium for Nebulization 3 milliLiter(s) Nebulizer every 6 hours  carbamide peroxide Otic Solution 5 Drop(s) Both Ears two times a day  cefTRIAXone   IVPB 1000 milliGRAM(s) IV Intermittent every 24 hours  cefTRIAXone   IVPB      heparin   Injectable 5000 Unit(s) SubCutaneous every 12 hours  lactated ringers. 1000 milliLiter(s) (60 mL/Hr) IV Continuous <Continuous>  sodium chloride 3%  Inhalation 3 milliLiter(s) Inhalation every 6 hours    MEDICATIONS  (PRN):  acetaminophen  Suppository .. 650 milliGRAM(s) Rectal every 4 hours PRN Temp greater or equal to 38C (100.4F)      Vital Signs Last 24 Hrs  T(C): 36.4 (07 Aug 2021 07:46), Max: 38.6 (06 Aug 2021 18:01)  T(F): 97.6 (07 Aug 2021 07:46), Max: 101.4 (06 Aug 2021 18:01)  HR: 83 (07 Aug 2021 07:46) (76 - 101)  BP: 124/77 (07 Aug 2021 07:46) (107/70 - 154/80)  BP(mean): --  RR: 18 (07 Aug 2021 07:46) (18 - 20)  SpO2: 94% (07 Aug 2021 07:46) (93% - 98%)  CAPILLARY BLOOD GLUCOSE        I&O's Summary    06 Aug 2021 07:01  -  07 Aug 2021 07:00  --------------------------------------------------------  IN: 1490 mL / OUT: 201 mL / NET: 1289 mL        PHYSICAL EXAM:  HEAD:  Atraumatic, Normocephalic  NECK: Supple, No   JVD  CHEST/LUNG:   no     rales,     no,    rhonchi  HEART: Regular rate and rhythm;         murmur  ABDOMEN: Soft, Nontender, ;   EXTREMITIES:   no     edema  NEUROLOGY:  dementia    LABS:                        13.9   4.67  )-----------( 144      ( 07 Aug 2021 06:53 )             41.9     08-07    141  |  103  |  7   ----------------------------<  92  3.6   |  26  |  0.62    Ca    9.4      07 Aug 2021 06:53  Mg     1.9     08-07    TPro  5.6<L>  /  Alb  2.9<L>  /  TBili  0.6  /  DBili  x   /  AST  16  /  ALT  15  /  AlkPhos  73  08-06                            Consultant(s) Notes Reviewed:      Care Discussed with Consultants/Other Providers:    
   afebrile  REVIEW OF SYSTEMS:  GEN: no fever,    no chills  RESP: no SOB,   no cough  CVS: no chest pain,   no palpitations  GI: no abdominal pain,   no nausea,   no vomiting,   no constipation,   no diarrhea  : no dysuria,   no frequency  NEURO: no headache,   no dizziness  PSYCH: no depression,   not anxious  Derm : no rash    MEDICATIONS  (STANDING):  acetylcysteine 20%  Inhalation 4 milliLiter(s) Inhalation three times a day  albuterol/ipratropium for Nebulization 3 milliLiter(s) Nebulizer every 6 hours  carbamide peroxide Otic Solution 5 Drop(s) Both Ears two times a day  cefTRIAXone   IVPB 1000 milliGRAM(s) IV Intermittent every 24 hours  cefTRIAXone   IVPB      heparin   Injectable 5000 Unit(s) SubCutaneous every 12 hours  lactated ringers. 1000 milliLiter(s) (60 mL/Hr) IV Continuous <Continuous>  sodium chloride 3%  Inhalation 3 milliLiter(s) Inhalation every 6 hours    MEDICATIONS  (PRN):  acetaminophen  Suppository .. 650 milliGRAM(s) Rectal every 4 hours PRN Temp greater or equal to 38C (100.4F)      Vital Signs Last 24 Hrs  T(C): 36.7 (08 Aug 2021 06:00), Max: 37.2 (07 Aug 2021 16:30)  T(F): 98.1 (08 Aug 2021 06:00), Max: 98.9 (07 Aug 2021 16:30)  HR: 80 (08 Aug 2021 06:00) (74 - 83)  BP: 130/70 (08 Aug 2021 06:00) (122/63 - 130/70)  BP(mean): --  RR: 18 (08 Aug 2021 06:00) (18 - 18)  SpO2: 94% (08 Aug 2021 06:00) (93% - 96%)  CAPILLARY BLOOD GLUCOSE        I&O's Summary    07 Aug 2021 07:01  -  08 Aug 2021 07:00  --------------------------------------------------------  IN: 1490 mL / OUT: 500 mL / NET: 990 mL        PHYSICAL EXAM:  HEAD:  Atraumatic, Normocephalic  NECK: Supple, No   JVD  CHEST/LUNG:   no     rales,     no,    rhonchi  HEART: Regular rate and rhythm;         murmur  ABDOMEN: Soft, Nontender, ;   EXTREMITIES:    no    edema  NEUROLOGY:  alert    LABS:                        13.9   4.67  )-----------( 144      ( 07 Aug 2021 06:53 )             41.9     08-07    141  |  103  |  7   ----------------------------<  92  3.6   |  26  |  0.62    Ca    9.4      07 Aug 2021 06:53  Mg     1.9     08-07                              Consultant(s) Notes Reviewed:      Care Discussed with Consultants/Other Providers:

## 2021-08-10 NOTE — CONSULT NOTE ADULT - PROBLEM SELECTOR RECOMMENDATION 2
Patient is hospice eligible and this was d/w his daughter.   A hospice referral was done; however, since the patient was leaving today, hospice will try to f/u with the patient's daughter once he gets home.

## 2021-08-10 NOTE — DISCHARGE NOTE NURSING/CASE MANAGEMENT/SOCIAL WORK - PATIENT PORTAL LINK FT
You can access the FollowMyHealth Patient Portal offered by [facility] by registering at the following website: [email or web address]. By joining [facility]’s FollowMyHealth portal, you will also be able to view your health information using other applications (apps) compatible with our system.

## 2021-08-10 NOTE — PROGRESS NOTE ADULT - REASON FOR ADMISSION
sob/ htn/ change of mental status

## 2021-08-10 NOTE — CONSULT NOTE ADULT - CONVERSATION DETAILS
d/w the patient's daughter about the patient's advanced illness (advanced dementia) and high risk for aspiration. His daughter expressed understanding about the info given and appreciated my recommendations for Dysphagia I diet with, at least, nectar thicken fluids as well as careful hand finding. Furthermore, to stop feeding if the patient develops cough or respiratory distress while being fed. His daughter understood risk of aspiration and including acute respiratory distress and even death; however, she believed that a feeding tube was not on her father's best interest. I also d/w her about code status and she appeared to be  for DNR/I; however, she said she needed to discuss about it with her siblings and mother. Finally, we discussed about hospice care and she was actually agreeable with a referral since one of her main goals was for trying to keep the patient home. I called hospice care network that will try to f/u with the patient's daughter for home hospice while in the community.

## 2021-08-10 NOTE — PROGRESS NOTE ADULT - ASSESSMENT
ASSESSMENT:    advanced dementia with underlying dysphagia admitted with RSV infection complicated by bronchospasm, mucous plugging and hypoxemia - he remains unvaccinated for COVID but has antibodies from a prior infection - CXR continues to show no evidence of pneumonia - ABG reveals resolution of the respiratory acidosis and borderline oxygenation    PLAN/RECOMMENDATIONS:    stable oxygenation on room air  head of bed elevation greater than 30 degrees at all times  NPO until mental status improves   has completed a 5 day course of ceftriaxone - blood and urine cultures are negative  albuterol/atrovent nebs q6h  mucomyst and hypertonic saline nebs to facilitate mucous clearance  diligent chest PT   oral hygiene and suctioning  acetaminophen for fever  DVT prophylaxis  IV fluids  physical therapy  palliative care team has been called to discuss goals of care with the family - they feel that they have been caring for their father for more than a decade at home - he is able to eat and walk several steps with full assistance - they do not want visiting nurses or aides    Will follow with you. Plan of care discussed with the patient's daughter at bedside and the dedicated floor NP. Discharge planning    Ranjan De Guzman MD, [facility]  [phone]  Pulmonary Medicine

## 2021-08-10 NOTE — CONSULT NOTE ADULT - ASSESSMENT
72yo M with end stage alzheimer's nonverbal at baseline presenting with complaints of SOB. per EMS pt has been coughing for the past several days with worsening SOB . no fevers at home. not vaccinated for covid. no n/v. As per pulmonary notes dated 8/9, "with underlying dysphagia admitted with RSV infection complicated by bronchospasm, mucous plugging and hypoxemia - he remains unvaccinated for COVID but has antibodies from a prior infection - CXR is without evidence of pneumonia - ABG reveals resolution of the respiratory acidosis and borderline oxygenation." Palliative care was called for GOC and ACP.

## 2021-08-11 LAB
CULTURE RESULTS: SIGNIFICANT CHANGE UP
SPECIMEN SOURCE: SIGNIFICANT CHANGE UP

## 2021-08-12 LAB
CULTURE RESULTS: SIGNIFICANT CHANGE UP
SPECIMEN SOURCE: SIGNIFICANT CHANGE UP

## 2022-09-08 NOTE — PROVIDER CONTACT NOTE (OTHER) - ASSESSMENT
pt AxOx2, nonverbal. no s/s of nonverbal indications of distress. o2 saturation 98% RA when pulseox connected to forehead ; o2 sat 87-88% via cameron on R ring finger -- inaccurate reading due to tremors. provider aware
Health Care Proxy (HCP)
